# Patient Record
Sex: FEMALE | Race: WHITE | Employment: OTHER | URBAN - METROPOLITAN AREA
[De-identification: names, ages, dates, MRNs, and addresses within clinical notes are randomized per-mention and may not be internally consistent; named-entity substitution may affect disease eponyms.]

---

## 2017-03-27 ENCOUNTER — OFFICE VISIT (OUTPATIENT)
Dept: VASCULAR SURGERY | Age: 67
End: 2017-03-27

## 2017-03-27 DIAGNOSIS — I71.40 ABDOMINAL AORTIC ANEURYSM WITHOUT RUPTURE: ICD-10-CM

## 2017-03-27 NOTE — PROCEDURES
[facility]   *** FINAL REPORT ***    Name: Graciela Gandhi  MRN: [id number]       Outpatient  : 11 Sep 1950  HIS Order #: [id number]  [address] [facility] Visit #: [id number]  Date: 27 Mar 2017    TYPE OF TEST: Aorto-Iliac Duplex    REASON FOR TEST  AAA    B-Mode:-                 (cm)   1     2     3  Aortic diameter:         AP:     1.8   2.4   2.9                           TV:     2.2   2.8   3.2  Common iliac diameter:   Right: 1.20                           Left:  1.00    Abdominal aortic aneuysm:-  Location:                Infrarenal  Type:                    Fusiform  Distance from SMA (cm):    Duplex:-                           PSV  Stenosis                           ----- --------------------  Aorta: (1)                53.0 Normal         (2)                47.0         (3)                36.0    Right common iliac:       68.0 Normal  Right external iliac:    Left common iliac:       105.0 < 50% stenosis  Left external iliac:    INTERPRETATION/FINDINGS  Duplex images were obtained using 2-D gray scale, color flow and  spectral doppler analysis. 1. Infrarenal aortic aneurysm measuring 2.9 cm x 3.2 cm Trnvs.  2. The aneurysm is fusiform. 3. Mild atherosclerotic changes in the left common iliac artery. 4. Patent Celiac, SMA and renal arteries without evidence of stenosis. 5. Patent OSMAN origin without stenosis. No significant change from prior exam.    ADDITIONAL COMMENTS  Celiac 123 cm/sec,  cm/sec, RRA 85 cm/sec,  cm/sec, OSMAN  187 cm/sec    I have personally reviewed the data relevant to the interpretation of  this  study. TECHNOLOGIST: Ramon Smith RDMS  Signed: 2017 09:58 AM    PHYSICIAN: Yuriy Vaughn.  Georgia Maciel MD  Signed: 2017 12:47 PM

## 2017-04-10 ENCOUNTER — OFFICE VISIT (OUTPATIENT)
Dept: VASCULAR SURGERY | Age: 67
End: 2017-04-10

## 2017-04-10 VITALS
SYSTOLIC BLOOD PRESSURE: 132 MMHG | DIASTOLIC BLOOD PRESSURE: 74 MMHG | WEIGHT: 182 LBS | RESPIRATION RATE: 16 BRPM | HEART RATE: 70 BPM | HEIGHT: 63 IN | BODY MASS INDEX: 32.25 KG/M2

## 2017-04-10 DIAGNOSIS — M51.36 DDD (DEGENERATIVE DISC DISEASE), LUMBAR: ICD-10-CM

## 2017-04-10 DIAGNOSIS — I70.213 ATHEROSCLEROSIS OF NATIVE ARTERY OF BOTH LOWER EXTREMITIES WITH INTERMITTENT CLAUDICATION (HCC): ICD-10-CM

## 2017-04-10 DIAGNOSIS — I71.9 DESCENDING AORTIC ANEURYSM (HCC): Primary | ICD-10-CM

## 2017-04-10 DIAGNOSIS — M54.10 RADICULOPATHY, UNSPECIFIED SPINAL REGION: ICD-10-CM

## 2017-04-10 DIAGNOSIS — M79.89 LEG SWELLING: ICD-10-CM

## 2017-04-10 NOTE — MR AVS SNAPSHOT
Visit Information Date & Time Provider Department Dept. Phone Encounter #  
 4/10/2017  9:30  [address], [address] Rhoda Burton [address] [place] [address] [id number] Follow-up Instructions Return in about 1 year (around 4/10/2018). Your Appointments 5/2/2017  3:45 PM  
PROCEDURE with [facility] NONIMAGING  
[facility] (CRISTINE SCHEDULING) Appt Note: leg art wild [address] [address]  
[phone] [address] [facility] [place] [address]  
  
    
 4/10/2018  9:00 AM  
PROCEDURE with [facility] IMAGING 1  
[facility] (CRISTINE SCHEDULING) Appt Note: aaa 1yr wild [address] [address]  
[phone] [address] 500 [facility] [place] [address]  
  
    
 4/25/2018  9:00 AM  
Follow Up with [address], [address] [facility] ([facility]) Appt Note: 1 year fu after aorta study at our lab on 4/10/2018 with prep [address]  
[phone] [address] [facility] Upcoming Health Maintenance Date Due Hepatitis C Screening 1950 DTaP/Tdap/Td series (1 - Tdap) 9/11/1970 BREAST CANCER SCRN MAMMOGRAM 9/11/1999 FOBT Q 1 YEAR AGE 50-75 9/11/1999 ZOSTER VACCINE AGE 60> 9/11/2009 GLAUCOMA SCREENING Q2Y 9/11/2014 OSTEOPOROSIS SCREENING (DEXA) 9/11/2014 MEDICARE YEARLY EXAM 9/11/2014 Pneumococcal 65+ Low/Medium Risk (1 of 2 - PCV13) 7/1/2015 INFLUENZA AGE 9 TO ADULT 8/1/2016 Allergies as of 4/10/2017  Review Complete On: 4/10/2017 By: Una Hughes, CELIA Severity Noted Reaction Type Reactions Benadryl [Diphenhydramine Hcl]  07/09/2014    Hives Codeine  07/09/2014    Itching Cymbalta [Duloxetine]  02/16/2016   Side Effect Other (comments) Shaking, feeling someone living inside my body, arms going numb. Iodinated Contrast Media - Oral And Iv Dye  07/09/2014    Hives Pt states severe hives Levaquin [Levofloxacin]  07/09/2014    Nausea Only Lyrica [Pregabalin]  02/16/2016    Other (comments) Shaking, feeling someone living in my body, numb arm. Any meds for fibromyialga Other Medication  08/18/2016    Swelling Pt wrote having an allergic reaction to \"tetnus\". \"Extreme swelling at site\" Sulfur  07/09/2014    Hives Current Immunizations  Reviewed on 7/1/2016 Name Date Pneumococcal Polysaccharide (PPSV-23) 7/1/2014 Not reviewed this visit You Were Diagnosed With   
  
 Codes Comments Atherosclerosis of native artery of both lower extremities with intermittent claudication ([facility]    -  Primary ICD-10-CM: S22.898 ICD-9-CM: 440.21 Descending aortic aneurysm ([facility]     ICD-10-CM: I71.9 ICD-9-CM: 291. 9 Vitals BP Pulse Resp Height(growth percentile) Weight(growth percentile) BMI  
 132/74 (BP 1 Location: Left arm, BP Patient Position: Sitting) 70 16 5' 3\" (1.6 m) 182 lb (82.6 kg) 32.24 kg/m2 Smoking Status Former Smoker Vitals History BMI and BSA Data Body Mass Index Body Surface Area  
 32.24 kg/m 2 1.92 m 2 Preferred Pharmacy Pharmacy Name Phone  [facility] [phone] Your Updated Medication List  
  
   
This list is accurate as of: 4/10/17  9:48 AM.  Always use your most recent med list.  
  
  
  
  
 AMBIEN 10 mg tablet Generic drug:  zolpidem Take  by mouth nightly as needed for Sleep. aspirin delayed-release 81 mg tablet Take  by mouth every other day. B COMPLETE PO Take  by mouth daily. CALCIUM 600 + D 600-125 mg-unit Tab Generic drug:  calcium-cholecalciferol (d3) Take  by mouth daily. COZAAR 100 mg tablet Generic drug:  losartan Take 100 mg by mouth daily. DUREZOL 0.05 % ophthalmic emulsion Generic drug:  Difluprednate  
  
 ergocalciferol 50,000 unit capsule Commonly known as:  ERGOCALCIFEROL FISH OIL 1,000 mg Cap Generic drug:  omega-3 fatty acids-vitamin e Take 1 Cap by mouth. FLONASE 50 mcg/actuation nasal spray Generic drug:  fluticasone  
two (2) times daily as needed. LASIX 20 mg tablet Generic drug:  furosemide Take  by mouth as needed. LIPITOR 40 mg tablet Generic drug:  atorvastatin Take  by mouth daily. MAGNESIUM CARBONATE PO Take  by mouth daily. multivitamin tablet Commonly known as:  ONE A DAY Take 1 Tab by mouth daily. nicotinic acid 500 mg tablet Commonly known as:  NIACIN Take 500 mg by mouth two (2) times daily (with meals). ondansetron hcl 4 mg tablet Commonly known as:  ZOFRAN (AS HYDROCHLORIDE) Take 1 Tab by mouth every eight (8) hours as needed for Nausea. pantoprazole 40 mg tablet Commonly known as:  PROTONIX Take 40 mg by mouth two (2) times a day. potassium 99 mg tablet Take 45.5 mg by mouth daily. PROCARDIA XL 30 mg ER tablet Generic drug:  NIFEdipine ER Take 30 mg by mouth daily. SINGULAIR 10 mg tablet Generic drug:  montelukast  
Take 10 mg by mouth daily as needed. TENORMIN 50 mg tablet Generic drug:  atenolol Take  by mouth two (2) times a day. VICODIN 5-500 mg per tablet Generic drug:  HYDROcodone-acetaminophen Take  by mouth every four (4) hours as needed for Pain. XYZAL 5 mg tablet Generic drug:  levocetirizine Take  by mouth daily. Follow-up Instructions Return in about 1 year (around 4/10/2018). To-Do List   
 04/21/2017 Imaging:  LOWER EXT ART PVR W EXERC BILAT (TREADMILL/WALKING) AMB   
  
 04/10/2018 Imaging:  DUPLEX AORTA ILIAC GRAFT COMPLETE AMB Please provide this summary of care documentation to your next provider. Your primary care clinician is listed as David Tello. If you have any questions after today's visit, please call [phone].

## 2017-04-10 NOTE — PROGRESS NOTES
Arnoldo Thorne    Chief Complaint   Patient presents with    Leg Pain       History and Physical    Arnoldo Thorne is a 78 y.o. female who presents today for her one year f/u for a descending aortic aneurysm. She is doing well however continues to have multiple complaints of chronic back pain, generalized fatigue and bilateral leg pain. She states that her leg pain is worse on the left. She reports that the pain does seem to start in her back and radiate down the leg. She does also complain of burning in her feet and has a history of peripheral neuropathy. She does not describe any classic claudication type symptoms. No rest pain. She did have MRI of her back in October which showed significant degenerative disk disease with severe left neural foraminal narrowing of the L3-L4 level and right paracentral disc protrusion at T11-T12 causing moderate right canal  Narrowing. Also of note AAA was reported to be 3cm in size. Being injections her spine which she states did not seem to help and were so painful that she would not wish to have any further injections. She does state that she has been recommended for surgery but is trying to hold off as long as she can. She does also report some leg swelling which improves with leg elevation. Denies fever/chills. She denies sharp or stabbing back/abd pain. Her last CT scan was done in September of 2015. Scan was done without contrast due to severe reaction to IV dye and allergy to Benadryl (she refuses to have dye unless absolutely necessary). Scan showed stable descending thoracic aorta measuring 3.5 x 4.0 cm at the diaphragm. The ascending aorta measures 3.5 x 3.6 cm. She presents today for her annual follow up. Ultrasound done 3/27/17 shows stable infrarenal aortic aneurysm measuring 2.9 cm x 3.2 cm. The aneurysm is fusiform. Mild atherosclerotic changes in the left common iliac artery. Patent Celiac, SMA and renal arteries without evidence of stenosis.  Patent OSMAN origin without stenosis. Past Medical History:   Diagnosis Date    Arthritis     Chronic lung disease     Hypercholesterolemia     Hypertension     Ill-defined condition      Past Surgical History:   Procedure Laterality Date    HX APPENDECTOMY      HX CATARACT REMOVAL      HX GYN       Patient Active Problem List   Diagnosis Code    Descending aortic aneurysm ([facility] I71.9    Hypertension I10    Hypercholesterolemia E78.00    Arthritis M19.90    Incidental lung nodule, > 3mm and < 8mm R91.1    Bronchitis with chronic airway obstruction (HCC) J44.9     Current Outpatient Prescriptions   Medication Sig Dispense Refill    DUREZOL 0.05 % ophthalmic emulsion   0    ergocalciferol (ERGOCALCIFEROL) 50,000 unit capsule   0    ondansetron hcl (ZOFRAN, AS HYDROCHLORIDE,) 4 mg tablet Take 1 Tab by mouth every eight (8) hours as needed for Nausea. 12 Tab 0    NIFEdipine ER (PROCARDIA XL) 30 mg ER tablet Take 30 mg by mouth daily.  atenolol (TENORMIN) 50 mg tablet Take  by mouth two (2) times a day.  losartan (COZAAR) 100 mg tablet Take 100 mg by mouth daily.  atorvastatin (LIPITOR) 40 mg tablet Take  by mouth daily.  pantoprazole (PROTONIX) 40 mg tablet Take 40 mg by mouth two (2) times a day.  HYDROcodone-acetaminophen (VICODIN) 5-500 mg per tablet Take  by mouth every four (4) hours as needed for Pain.  zolpidem (AMBIEN) 10 mg tablet Take  by mouth nightly as needed for Sleep.  levocetirizine (XYZAL) 5 mg tablet Take  by mouth daily.  fluticasone (FLONASE) 50 mcg/actuation nasal spray two (2) times daily as needed.  montelukast (SINGULAIR) 10 mg tablet Take 10 mg by mouth daily as needed.  furosemide (LASIX) 20 mg tablet Take  by mouth as needed.  multivitamin (ONE A DAY) tablet Take 1 Tab by mouth daily.  calcium-cholecalciferol, d3, (CALCIUM 600 + D) 600-125 mg-unit tab Take  by mouth daily.       omega-3 fatty acids-vitamin e (FISH OIL) 1,000 mg cap Take 1 Cap by mouth.  nicotinic acid (NIACIN) 500 mg tablet Take 500 mg by mouth two (2) times daily (with meals).  aspirin delayed-release 81 mg tablet Take  by mouth every other day.  VITAMIN B COMPLEX (B COMPLETE PO) Take  by mouth daily.  MAGNESIUM CARBONATE PO Take  by mouth daily.  potassium 99 mg tablet Take 45.5 mg by mouth daily. Allergies   Allergen Reactions    Benadryl [Diphenhydramine Hcl] Hives    Codeine Itching    Cymbalta [Duloxetine] Other (comments)     Shaking, feeling someone living inside my body, arms going numb.  Iodinated Contrast Media - Oral And Iv Dye Hives     Pt states severe hives     Levaquin [Levofloxacin] Nausea Only    Lyrica [Pregabalin] Other (comments)     Shaking, feeling someone living in my body, numb arm. Any meds for fibromyialga    Other Medication Swelling     Pt wrote having an allergic reaction to \"tetnus\". \"Extreme swelling at site\"     Sulfur Hives       Physical Exam:    Visit Vitals    /74 (BP 1 Location: Left arm, BP Patient Position: Sitting)    Pulse 70    Resp 16    Ht 5' 3\" (1.6 m)    Wt 182 lb (82.6 kg)    BMI 32.24 kg/m2      General: Well-appearing female in no acute distress   HEENT: EOMI, no scleral icterus is noted. Pulmonary: No increased work or breathing is noted. Abdomen: nondistended    Extremities: Warm and well perfused bilaterally. Pt has trace BLE edema. Neuro: Cranial nerves II through XII are grossly intact   Integument: No ulcerations are identified visibly      Impression and Plan:  Claudia Price is a 78 y.o. female with a stable 3 cm AAA. Imaging was reviewed and office today with patient. She does have chronic pain secondary to her spinal issues. She is also having worsening leg pain, left greater than right. She does not describe any symptoms of classic claudication or rest pain. She does have some mild atherosclerosis noted in the left common iliac artery. Discussed that we will obtain arterial studies to rule out any underlying arterial insufficiency which may be contributing to her pseudoclaudication type symptoms. I did discuss that her leg pain is likely directly related to her back issues and she is understanding of this. Her arterial studies. Recommend leg elevation and compression therapy for her leg swelling and she expresses understanding. We discussed repeat ultrasound in 1 year for continued surveillance and she will f/u afterwards. Sooner as needed. Plan was discussed. Patient expresses understanding and agrees. Follow-up Disposition:  Return in about 1 year (around 4/10/2018).     Liberty Billings  [phone]

## 2017-05-02 ENCOUNTER — OFFICE VISIT (OUTPATIENT)
Dept: VASCULAR SURGERY | Age: 67
End: 2017-05-02

## 2017-05-02 DIAGNOSIS — I70.213 ATHEROSCLEROSIS OF NATIVE ARTERY OF BOTH LOWER EXTREMITIES WITH INTERMITTENT CLAUDICATION (HCC): ICD-10-CM

## 2017-05-02 DIAGNOSIS — I71.9 DESCENDING AORTIC ANEURYSM (HCC): ICD-10-CM

## 2017-05-02 NOTE — PROCEDURES
[facility]   *** FINAL REPORT ***    Name: Ishmael Lang  MRN: [id number]       Outpatient  : 11 Sep 1950  HIS Order #: [id number]  [id number] [facility] Visit #: [id number]  Date: 02 May 2017    TYPE OF TEST: Peripheral Arterial Testing    REASON FOR TEST  Limb pain    Right Leg  Segmentals: Normal                     mmHg  Brachial         135  High thigh  Low thigh  Calf             154  Posterior tibial 127  Dorsalis pedis   153  Peroneal  Metatarsal       117  Toe pressure  Doppler:    Normal  Ankle/Brachial: 1.13    Left Leg  Segmentals: Normal                     mmHg  Brachial         136  High thigh  Low thigh  Calf             153  Posterior tibial 118  Dorsalis pedis   143  Peroneal  Metatarsal       109  Toe pressure  Doppler:    Normal  Ankle/Brachial: 1.05  Post exercise results:  Speed: 1.0  mph  Grade: 12  %  Duration: 5 MIN     Brachial  Right Ankle  JORY    Left Ankle  JORY    1:               163     1.20       174     1.28  2:  3:  4:  5:    INTERPRETATION/FINDINGS  Physiologic testing was performed using continuous wave doppler and  segmental pressures. 1. No evidence of significant peripheral arterial disease at rest in  the right leg. 2. No evidence of significant peripheral arterial disease at rest in  the left leg. 3. The right ankle/brachial index is 1.13 and the left ankle/brachial  index is 1.05.  4. Treadmill testing of 5 minutes at 1.0mph at 12% incline showed no  significant drop in the ankle brachial index bilaterally or inflow  disease, consistent with normal peripheral arterial perfusion. ADDITIONAL COMMENTS    I have personally reviewed the data relevant to the interpretation of  this  study. TECHNOLOGIST: Arabella Espitia RDMS  Signed: 2017 04:26 PM    PHYSICIAN: Janie Magana.  Deanna Oviedo MD  Signed: 2017 09:54 AM

## 2017-05-03 ENCOUNTER — TELEPHONE (OUTPATIENT)
Dept: VASCULAR SURGERY | Age: 67
End: 2017-05-03

## 2017-05-03 NOTE — TELEPHONE ENCOUNTER
Call discussed patient's arterial study  Study showed no significant arterial insufficiency bilaterally that would explain her leg pain  Patient states that she is scheduled to follow-up with her orthopedic physician next week  I advised her to call the office if she has any further concerns or problems  Otherwise we will see her at her regular scheduled follow-up appointment

## 2017-06-07 ENCOUNTER — OFFICE VISIT (OUTPATIENT)
Dept: ORTHOPEDIC SURGERY | Age: 67
End: 2017-06-07

## 2017-06-07 VITALS
HEART RATE: 52 BPM | OXYGEN SATURATION: 92 % | RESPIRATION RATE: 18 BRPM | WEIGHT: 185.8 LBS | DIASTOLIC BLOOD PRESSURE: 63 MMHG | TEMPERATURE: 98.1 F | SYSTOLIC BLOOD PRESSURE: 143 MMHG | HEIGHT: 63 IN | BODY MASS INDEX: 32.92 KG/M2

## 2017-06-07 DIAGNOSIS — M47.26 OSTEOARTHRITIS OF SPINE WITH RADICULOPATHY, LUMBAR REGION: ICD-10-CM

## 2017-06-07 DIAGNOSIS — M50.90 CERVICAL DISC DISEASE: Primary | ICD-10-CM

## 2017-06-07 DIAGNOSIS — M79.7 FIBROMYALGIA: ICD-10-CM

## 2017-06-07 DIAGNOSIS — G62.9 NEUROPATHY: ICD-10-CM

## 2017-06-07 RX ORDER — DIVALPROEX SODIUM 250 MG/1
TABLET, DELAYED RELEASE ORAL
Refills: 0 | COMMUNITY
Start: 2017-04-11 | End: 2017-06-07

## 2017-06-07 RX ORDER — HYDROCODONE BITARTRATE AND ACETAMINOPHEN 5; 325 MG/1; MG/1
TABLET ORAL
Refills: 0 | COMMUNITY
Start: 2017-04-23 | End: 2017-06-07

## 2017-06-07 RX ORDER — METHOCARBAMOL 500 MG/1
TABLET, FILM COATED ORAL
Qty: 60 TAB | Refills: 1 | Status: ON HOLD | OUTPATIENT
Start: 2017-06-07 | End: 2017-08-21

## 2017-06-07 NOTE — PATIENT INSTRUCTIONS
Cervical Disc Disease: Care Instructions  Your Care Instructions    Cervical disc disease results from damage, disease, or wear and tear to the discs between the bones (vertebra) in your neck. The discs act as shock absorbers for the spine and keep the spine flexible. When a disc is damaged, it can bulge out and press against the nerve roots or spinal cord. This is sometimes called a herniated or \"slipped disc. \" This pressure can cause pain and numbness or tingling in your arms and hands. It can also cause weakness in your legs. An accident can damage a disc and cause it to break open (rupture). Aging and hard physical work can also cause damage to cervical discs. The first treatments for cervical disc disease include physical therapy, special neck exercises, heat, and pain medicine. If these fail, your doctor may inject steroids and pain medicine into your neck. Surgery is usually done only if other treatments have not worked. Follow-up care is a key part of your treatment and safety. Be sure to make and go to all appointments, and call your doctor if you are having problems. It's also a good idea to know your test results and keep a list of the medicines you take. How can you care for yourself at home? · Take pain medicines exactly as directed. ¨ If the doctor gave you a prescription medicine for pain, take it as prescribed. ¨ If you are not taking a prescription pain medicine, ask your doctor if you can take an over-the-counter medicine. · Don't spend too long in one position. Take short breaks to move around and change positions. · Wear a seat belt and shoulder harness when you are in a car. · Sleep with a pillow under your head and neck that keeps your neck straight. · Follow your doctor's instructions for gentle neck-stretching exercises. · Do not smoke. Smoking can slow healing of your discs. If you need help quitting, talk to your doctor about stop-smoking programs and medicines.  These can increase your chances of quitting for good. · Avoid strenuous work or exercise until your doctor says it is okay. When should you call for help? Call 911 anytime you think you may need emergency care. For example, call if:  · You are unable to move an arm or a leg at all. Call your doctor now or seek immediate medical care if:  · You have new or worse symptoms in your arms, legs, chest, belly, or buttocks. Symptoms may include:  ¨ Numbness or tingling. ¨ Weakness. ¨ Pain. · You lose bladder or bowel control. Watch closely for changes in your health, and be sure to contact your doctor if:  · You are not getting better as expected. Where can you learn more? Go to http://ian-tony.info/. Enter N118 in the search box to learn more about \"Cervical Disc Disease: Care Instructions. \"  Current as of: May 23, 2016  Content Version: 11.2  © 7274-7015 [employer]. Care instructions adapted under license by [employer] (which disclaims liability or warranty for this information). If you have questions about a medical condition or this instruction, always ask your healthcare professional. [address] any warranty or liability for your use of this information.

## 2017-06-07 NOTE — PROGRESS NOTES
[address]  [address], [address]  [place]  Phone: [phone]  Fax: [phone]        Laretta Collet  : 1950  PCP: Krissy Galeano MD      NEW PATIENT      ASSESSMENT AND PLAN     Cedric Johnson was seen today for back pain. Diagnoses and all orders for this visit:    Cervical disc disease  -     MRI CERV SPINE WO CONT; Future    Osteoarthritis of spine with radiculopathy, lumbar region  -     MRI CERV SPINE WO CONT; Future    Neuropathy  -     MRI CERV SPINE WO CONT; Future    Fibromyalgia  -     MRI CERV SPINE WO CONT; Future    Other orders  -     methocarbamol (ROBAXIN) 500 mg tablet; TAKE 1/2-1 TAB PO TID PRN SPASMS/PAIN    1. Cervical spine MRI to eval for cervical stenosis. 2. Trial of Robaxin. 3. Advised to stay active as tolerated. 4, Given information on cervical DDD. Follow-up Disposition:  Return for MRI/CT f/u. CHIEF COMPLAINT  Alex Santiago is seen today in consultation at the request of Dr. Ras Bright for complaints of back, neck, hips and knee pain for years. HISTORY OF PRESENT ILLNESS  Alex Santiago is a 78 y.o. female. Lately her pain has been increasing. She had an injury in  that started her pain and since this time she has been having pain all over her body \"except for the top of her head. \" She has been diagnosed with fibromyalgia. Pt notes that she has had injections, physical therapy, and medications without any benefit. She is most bothered by her back and leg pain. She has soreness in her mid back that radiates down into her low back. Her pain increases in severity as it radiates down her back. Her pain radiates into her BLE, into her knees and down into her feet. Her neck pain started in her head and down into her neck and shoulders. She has not tried aqua therapy. She has become tired of injections as she only has about a month-two months of benefit and her pain will return.  She notes that her LLE pain is more severe than her RLE. She has been told that she has neuropathy. Pt notes that she has paresthesia diffusely throughout her body. Pt has difficulty sleeping and staying active due to her pain. She admits to balance issues. No saddle paresthesia. Pt is unable to tolerate the medications for fibromyalgia as she has negative side effects with them. She takes Ambien every night that only gives her 5 hours of sleep. Pt uses PRN Flexeril. Denies persistent fevers, chills, weight changes, neurogenic bowel or bladder symptoms. Pt denies recent ED visits or hospitalizations. MRI images reviewed, diffuse changes. Pain Assessment  6/7/2017   Location of Pain Back;Hip;Knee; Foot   Location Modifiers Left;Right   Severity of Pain 5   Quality of Pain Aching;Burning   Quality of Pain Comment numbness, tingling   Frequency of Pain Constant   Aggravating Factors Standing;Walking;Bending   Relieving Factors (No Data)   Relieving Factors Comment laying down, pain pill         MRI Results (most recent):    Results from Hospital Encounter encounter on 10/08/16   MRI LUMB SPINE WO CONT   Narrative EXAM: Lumbar MRI without contrast    CLINICAL INDICATION: Low back pain    TECHNIQUE: MRI of the lumbar spine without contrast obtained. Multiplanar  multisequence MR images of the lumbar spine obtained. IV Contrast: None    COMPARISON: CT of the chest, abdomen and pelvis dated 7/7/2015    FINDINGS: All numbering assumes 5 lumbar type vertebrae. The alignment  demonstrates 4 mm of anterolisthesis of L4 on L5. The marrow signal is normal.  The cord terminates at L1. No cord signal abnormalities appreciated. The abdominal aorta is not well visualized on this exam. However, does appear to  be aneurysmal in the infrarenal portion measuring up to 3 cm in width. 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  The left IVC fuses  with the right IVC just inferior to the aortic hiatus in a retroaortic fashion. No paraaortic adenopathy appreciated. The visualized kidneys are unremarkable. The T11-T12 level demonstrates right paracentral disc protrusion which appears  to abut the cord and causes moderate right canal canal narrowing. The T12-L1 level demonstrates minimal degenerative changes. No significant canal  or neural foraminal stenosis. L1-L2 level: Mild broad-based disc bulge and mild facet arthropathy. No  significant canal or neural foraminal stenosis. L2-L3: Mild broad-based disc bulge with mild facet hypertrophy and ligament  flavum thickening. There is mild canal narrowing with moderate bilateral neural  foraminal narrowing. L3-L4: Facet arthropathy with broad-based disc bulge is present. Moderate right  and severe left neural foraminal narrowing are noted. There is minimal impact  upon the canal.    L4-L5: Anterolisthesis is present. Facet arthropathy and mild broad-based disc  bulge is noted. Mild to moderate bilateral neural foraminal narrowing is noted. Minimal impact upon the canal is noted. L5-S1: Facet arthropathy is present. Mild broad-based disc bulge is noted. Moderate left and mild right neural foraminal narrowing are noted. No  significant canal stenosis. A right sided Tarlov cyst is noted at about the level of S2-S3. Impression Impression:  1. Multilevel degenerative disc disease and facet arthropathy. 2.  Infrarenal abdominal aortic aneurysm which is incompletely evaluated on this  exam.     3.  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 4.  Severe left neural foraminal narrowing of the L3-L4 level. 5.  Right paracentral disc protrusion at T11-T12 causing moderate right canal  narrowing.                       PAST MEDICAL HISTORY   Past Medical History:   Diagnosis Date    Arthritis     Chronic lung disease     Hypercholesterolemia     Hypertension     Ill-defined condition        Past Surgical History:   Procedure Laterality Date    HX APPENDECTOMY      HX CATARACT REMOVAL      HX GYN         MEDICATIONS    Current Outpatient Prescriptions   Medication Sig Dispense Refill    ergocalciferol (ERGOCALCIFEROL) 50,000 unit capsule   0    NIFEdipine ER (PROCARDIA XL) 30 mg ER tablet Take 30 mg by mouth daily.  atenolol (TENORMIN) 50 mg tablet Take  by mouth two (2) times a day.  losartan (COZAAR) 100 mg tablet Take 100 mg by mouth daily.  atorvastatin (LIPITOR) 40 mg tablet Take  by mouth daily.  pantoprazole (PROTONIX) 40 mg tablet Take 40 mg by mouth two (2) times a day.  zolpidem (AMBIEN) 10 mg tablet Take  by mouth nightly as needed for Sleep.  levocetirizine (XYZAL) 5 mg tablet Take  by mouth daily.  fluticasone (FLONASE) 50 mcg/actuation nasal spray two (2) times daily as needed.  montelukast (SINGULAIR) 10 mg tablet Take 10 mg by mouth daily as needed.  multivitamin (ONE A DAY) tablet Take 1 Tab by mouth daily.  calcium-cholecalciferol, d3, (CALCIUM 600 + D) 600-125 mg-unit tab Take  by mouth daily.  omega-3 fatty acids-vitamin e (FISH OIL) 1,000 mg cap Take 1 Cap by mouth.  aspirin delayed-release 81 mg tablet Take  by mouth every other day.  VITAMIN B COMPLEX (B COMPLETE PO) Take  by mouth daily.  MAGNESIUM CARBONATE PO Take  by mouth daily.  potassium 99 mg tablet Take 45.5 mg by mouth daily.  divalproex DR (DEPAKOTE) 250 mg tablet take 1 tablet by mouth once daily  0    HYDROcodone-acetaminophen (NORCO) 5-325 mg per tablet TAKE 1 TABLET BY MOUTH EVERY 8 HOURS AS NEEDED  0    DUREZOL 0.05 % ophthalmic emulsion   0    ondansetron hcl (ZOFRAN, AS HYDROCHLORIDE,) 4 mg tablet Take 1 Tab by mouth every eight (8) hours as needed for Nausea. 12 Tab 0    HYDROcodone-acetaminophen (VICODIN) 5-500 mg per tablet Take  by mouth every four (4) hours as needed for Pain.  furosemide (LASIX) 20 mg tablet Take  by mouth as needed.       nicotinic acid (NIACIN) 500 mg tablet Take 500 mg by mouth two (2) times daily (with meals). ALLERGIES  Allergies   Allergen Reactions    Benadryl [Diphenhydramine Hcl] Hives    Codeine Itching    Cymbalta [Duloxetine] Other (comments)     Shaking, feeling someone living inside my body, arms going numb.  Iodinated Contrast Media - Oral And Iv Dye Hives     Pt states severe hives     Levaquin [Levofloxacin] Nausea Only    Lyrica [Pregabalin] Other (comments)     Shaking, feeling someone living in my body, numb arm. Any meds for fibromyialga    Other Medication Swelling     Pt wrote having an allergic reaction to \"tetnus\". \"Extreme swelling at site\"     Sulfur Hives          SOCIAL HISTORY    Social History     Social History    Marital status:      Spouse name: N/A    Number of children: N/A    Years of education: N/A     Occupational History    Not on file. Social History Main Topics    Smoking status: Former Smoker     Packs/day: 1.00     Years: 45.00     Types: Cigarettes     Quit date: 1/1/2014    Smokeless tobacco: Not on file    Alcohol use No    Drug use: No    Sexual activity: Not on file     Other Topics Concern    Not on file     Social History Narrative       FAMILY HISTORY  Family History   Problem Relation Age of Onset    Hypertension Mother     Diabetes Mother     Cancer Father     Stroke Father          REVIEW OF SYSTEMS  Review of Systems   Constitutional: Negative for chills, fever and weight loss. Respiratory: Negative for shortness of breath. Cardiovascular: Negative for chest pain. Gastrointestinal: Negative for constipation. Negative for fecal incontinence   Genitourinary: Negative for dysuria. Negative for urinary incontinence   Musculoskeletal:        Per HPI   Skin: Negative for rash. Neurological: Positive for tingling. Negative for dizziness, tremors, focal weakness and headaches. Endo/Heme/Allergies: Does not bruise/bleed easily. Psychiatric/Behavioral: The patient does not have insomnia. PHYSICAL EXAMINATION  Visit Vitals    /63    Pulse (!) 52    Temp 98.1 °F (36.7 °C) (Oral)    Resp 18    Ht 5' 3\" (1.6 m)    Wt 185 lb 12.8 oz (84.3 kg)    SpO2 92%    BMI 32.91 kg/m2          Accompanied by self. Constitutional:  Well developed, well nourished, in no acute distress. Psychiatric: Affect and mood are appropriate. Integumentary: No rashes or abrasions noted on exposed areas. Cardiovascular/Peripheral Vascular: Intact l pulses. No peripheral edema is noted. Lymphatic:  No evidence of lymphedema. No cervical lymphadenopathy. SPINE/MUSCULOSKELETAL EXAM    Cervical spine:  Neck is midline. Normal muscle tone. No focal atrophy is noted. Shoulder ROM intact. Tenderness to palpation diffusely cervical and thoracic region B/L. Negative Spurling's sign. Negative Tinel's sign. Negative Nath's sign. Sensation grossly intact to light touch. Lumbar spine:  No rash, ecchymosis, or gross obliquity. No fasciculations. No focal atrophy is noted. Tenderness to palpation of diffusely. Sensation grossly intact to light touch. MOTOR:      Biceps  Triceps Deltoids Wrist Ext Wrist Flex Hand Intrin   Right +4/5 +4/5 +4/5 +4/5 +4/5 +4/5   Left +4/5 +4/5 +4/5 +4/5 +4/5 +4/5        Hip Flex  Quads Hamstrings Ankle DF EHL Ankle PF   Right +4/5 +4/5 +4/5 +4/5 +4/5 +4/5   Left +4/5 +4/5 +4/5 +4/5 +4/5 +4/5     DTRs are 2+ biceps, triceps, brachioradialis, patella, and Achilles. Straight Leg raise negative. Mild difficulty with tandem gait. Ambulation without assistive device. FWB. Written by Brayan Leblanc, as dictated by Alice Encarnacion MD.    I, Dr. Alice Encarnacion MD, confirm that all documentation is accurate. Ms. Merlin Majestic may have a reminder for a \"due or due soon\" health maintenance. I have asked that she contact her primary care provider for follow-up on this health maintenance.

## 2017-06-07 NOTE — PROGRESS NOTES
VORB ENTERED PER DR. Mark Castillo AS DOCUMENTED ON BLUE SHEET:MRI CERVICAL WO CONTRAST FOR INCREASED NECK PAIN N/T/W EXTENDING TO B/L UPPER EXT(S); ROBAXIN 500 MG 1/2-1 TAB PO TID PRN SPASMS/PAIN #60 RF 1.

## 2017-06-09 ENCOUNTER — HOSPITAL ENCOUNTER (OUTPATIENT)
Age: 67
Discharge: HOME | End: 2017-06-09
Attending: PHYSICAL MEDICINE & REHABILITATION
Payer: MEDICARE

## 2017-06-09 DIAGNOSIS — G62.9 NEUROPATHY: ICD-10-CM

## 2017-06-09 DIAGNOSIS — M79.7 FIBROMYALGIA: ICD-10-CM

## 2017-06-09 DIAGNOSIS — M50.90 CERVICAL DISC DISEASE: ICD-10-CM

## 2017-06-09 DIAGNOSIS — M47.26 OSTEOARTHRITIS OF SPINE WITH RADICULOPATHY, LUMBAR REGION: ICD-10-CM

## 2017-06-09 PROCEDURE — 72141 MRI NECK SPINE W/O DYE: CPT

## 2017-06-15 ENCOUNTER — OFFICE VISIT (OUTPATIENT)
Dept: ORTHOPEDIC SURGERY | Age: 67
End: 2017-06-15

## 2017-06-15 VITALS
DIASTOLIC BLOOD PRESSURE: 58 MMHG | HEART RATE: 60 BPM | WEIGHT: 183 LBS | BODY MASS INDEX: 32.43 KG/M2 | HEIGHT: 63 IN | SYSTOLIC BLOOD PRESSURE: 124 MMHG

## 2017-06-15 DIAGNOSIS — M50.90 CERVICAL DISC DISEASE: Primary | ICD-10-CM

## 2017-06-15 NOTE — MR AVS SNAPSHOT
Visit Information Date & Time Provider Department Dept. Phone Encounter #  
 6/15/2017 11:45 AM Kim Mccartney MD [facility] [phone] [id number] Follow-up Instructions Return if symptoms worsen or fail to improve. Your Appointments 4/10/2018  9:00 AM  
PROCEDURE with [facility] ([facility]) Appt Note: aaa 1yr wild; r/s to new office [address] [facility] [address] [facility] [address]  
[phone] [address] [facility] [address] [facility]  
  
    
 4/25/2018  9:00 AM  
Follow Up with [facility] ([facility]) Appt Note: 1 year fu after aorta study at our lab on 4/10/2018 with prep [address] [facility] [address] [facility] [address]  
[phone] [address] [facility] [place] [address] [facility] [address] Upcoming Health Maintenance Date Due Hepatitis C Screening 1950 DTaP/Tdap/Td series (1 - Tdap) 9/11/1970 BREAST CANCER SCRN MAMMOGRAM 9/11/1999 FOBT Q 1 YEAR AGE 50-75 9/11/1999 ZOSTER VACCINE AGE 60> 9/11/2009 GLAUCOMA SCREENING Q2Y 9/11/2014 OSTEOPOROSIS SCREENING (DEXA) 9/11/2014 MEDICARE YEARLY EXAM 9/11/2014 Pneumococcal 65+ Low/Medium Risk (1 of 2 - PCV13) 7/1/2015 INFLUENZA AGE 9 TO ADULT 8/1/2017 Allergies as of 6/15/2017  Review Complete On: 6/15/2017 By: Kim Mccartney MD  
  
 Severity Noted Reaction Type Reactions Benadryl [Diphenhydramine Hcl]  07/09/2014    Hives Codeine  07/09/2014    Itching Cymbalta [Duloxetine]  02/16/2016   Side Effect Other (comments) Shaking, feeling someone living inside my body, arms going numb. Iodinated Contrast- Oral And Iv Dye  07/09/2014    Hives Pt states severe hives Levaquin [Levofloxacin]  07/09/2014    Nausea Only Lyrica [Pregabalin]  02/16/2016    Other (comments) Shaking, feeling someone living in my body, numb arm. Any meds for fibromyialga Other Medication  08/18/2016    Swelling Pt wrote having an allergic reaction to \"tetnus\". \"Extreme swelling at site\" Sulfur  07/09/2014    Hives Current Immunizations  Reviewed on 7/1/2016 Name Date Pneumococcal Polysaccharide (PPSV-23) 7/1/2014 Not reviewed this visit Vitals BP Pulse Height(growth percentile) Weight(growth percentile) BMI Smoking Status 124/58 60 5' 3\" (1.6 m) 183 lb (83 kg) 32.42 kg/m2 Former Smoker Vitals History BMI and BSA Data Body Mass Index Body Surface Area  
 32.42 kg/m 2 1.92 m 2 Preferred Pharmacy Pharmacy Name Phone [address], [address] [place] [address] [phone] Your Updated Medication List  
  
   
This list is accurate as of: 6/15/17 12:40 PM.  Always use your most recent med list.  
  
  
  
  
 AMBIEN 10 mg tablet Generic drug:  zolpidem Take  by mouth nightly as needed for Sleep. aspirin delayed-release 81 mg tablet Take  by mouth every other day. B COMPLETE PO Take  by mouth daily. CALCIUM 600 + D 600-125 mg-unit Tab Generic drug:  calcium-cholecalciferol (d3) Take  by mouth daily. COZAAR 100 mg tablet Generic drug:  losartan Take 100 mg by mouth daily. ergocalciferol 50,000 unit capsule Commonly known as:  ERGOCALCIFEROL Flaxseed Oil Oil  
by Does Not Apply route. FLONASE 50 mcg/actuation nasal spray Generic drug:  fluticasone  
two (2) times daily as needed. LIPITOR 40 mg tablet Generic drug:  atorvastatin Take  by mouth daily. MAGNESIUM CARBONATE PO Take  by mouth daily. methocarbamol 500 mg tablet Commonly known as:  ROBAXIN  
TAKE 1/2-1 TAB PO TID PRN SPASMS/PAIN  
  
 multivitamin tablet Commonly known as:  ONE A DAY Take 1 Tab by mouth daily. pantoprazole 40 mg tablet Commonly known as:  PROTONIX Take 40 mg by mouth two (2) times a day. potassium 99 mg tablet Take 45.5 mg by mouth daily. PROCARDIA XL 30 mg ER tablet Generic drug:  NIFEdipine ER Take 30 mg by mouth daily. SINGULAIR 10 mg tablet Generic drug:  montelukast  
Take 10 mg by mouth daily as needed. TENORMIN 50 mg tablet Generic drug:  atenolol Take  by mouth two (2) times a day. XYZAL 5 mg tablet Generic drug:  levocetirizine Take  by mouth daily. Follow-up Instructions Return if symptoms worsen or fail to improve. Patient Instructions Learning About Medial Branch Block and Neurotomy What are medial branch block and neurotomy? Facet joints connect your vertebrae to each other. Problems in these joints can cause chronic (long-term) pain in the neck or back. They can sometimes affect the shoulders, arms, buttocks, or legs. Medial branch nerves are the nerves that carry many of the pain messages from your facet joints. Radiofrequency medial branch neurotomy is a type of medial branch neurotomy that is used to relieve arthritis pain. It uses radio waves to damage nerves in your neck or back so that they can no longer send pain messages to your brain. Before your doctor knows if a neurotomy will help you, he or she will do a medial branch block to find out if certain nerves are the ones that are a source of your pain. You will need two separate visits to the outpatient center or hospital to have both procedures. How is a medial branch block done? The doctor will use a tiny needle to numb the skin where you will get the block. Then he or she puts the block needle into the numbed area. You may feel some pressure, but you should not feel pain. Using fluoroscopy (live X-ray) to guide the needle, the doctor injects medicine onto one or more nerves to make them numb.  
If you get relief from your pain in the next 4 to 6 hours, it's a sign that those nerves may be contributing to your pain. The relief will last only a short time. You may then have a medial branch neurotomy at a later visit to try to get longer relief. It takes 20 to 30 minutes to get the block. You can go home after the doctor watches you for about an hour. You will get instructions on how to report how much pain you have when you are at home. You will need someone to drive you home. How is medial branch neurotomy done? The doctor will use a tiny needle to numb the skin where you will get the neurotomy. Then he or she puts the neurotomy needle into the numbed area. You may feel some pressure. Using fluoroscopy (live X-ray) to guide the needle, the doctor sends radio waves through the needle to the nerve for 60 to 90 seconds. The radio waves heat the nerve, which damages it. The doctor may do this several times. And he or she may treat more than one nerve. It takes 45 to 90 minutes to get a neurotomy, depending on how many nerves are heated. You will probably go home 30 to 60 minutes later. You will need someone to drive you home. What can you expect after a neurotomy? You may feel a little sore or tender at the injection site at first. But after a successful neurotomy, most people have pain relief right away. It often lasts for 9 to 12 months or longer. Sometimes the pain relief is permanent. If your pain does come back, it may mean that the damaged nerve has healed and can send pain messages again. Or it can mean that a different nerve is causing pain. Your doctor will discuss your options with you. Follow-up care is a key part of your treatment and safety. Be sure to make and go to all appointments, and call your doctor if you are having problems. It's also a good idea to know your test results and keep a list of the medicines you take. Where can you learn more? Go to http://ian-tony.info/. Enter X459 in the search box to learn more about \"Learning About Medial Branch Block and Neurotomy. \" Current as of: October 14, 2016 Content Version: 11.2 © 4112-4060 [employer]. Care instructions adapted under license by [employer] (which disclaims liability or warranty for this information). If you have questions about a medical condition or this instruction, always ask your healthcare professional. [place] [address] any warranty or liability for your use of this information. Please provide this summary of care documentation to your next provider. Your primary care clinician is listed as Kenia Pettit. If you have any questions after today's visit, please call [phone].

## 2017-06-15 NOTE — PATIENT INSTRUCTIONS
Learning About Medial Branch Block and Neurotomy  What are medial branch block and neurotomy? Facet joints connect your vertebrae to each other. Problems in these joints can cause chronic (long-term) pain in the neck or back. They can sometimes affect the shoulders, arms, buttocks, or legs. Medial branch nerves are the nerves that carry many of the pain messages from your facet joints. Radiofrequency medial branch neurotomy is a type of medial branch neurotomy that is used to relieve arthritis pain. It uses radio waves to damage nerves in your neck or back so that they can no longer send pain messages to your brain. Before your doctor knows if a neurotomy will help you, he or she will do a medial branch block to find out if certain nerves are the ones that are a source of your pain. You will need two separate visits to the outpatient center or hospital to have both procedures. How is a medial branch block done? The doctor will use a tiny needle to numb the skin where you will get the block. Then he or she puts the block needle into the numbed area. You may feel some pressure, but you should not feel pain. Using fluoroscopy (live X-ray) to guide the needle, the doctor injects medicine onto one or more nerves to make them numb. If you get relief from your pain in the next 4 to 6 hours, it's a sign that those nerves may be contributing to your pain. The relief will last only a short time. You may then have a medial branch neurotomy at a later visit to try to get longer relief. It takes 20 to 30 minutes to get the block. You can go home after the doctor watches you for about an hour. You will get instructions on how to report how much pain you have when you are at home. You will need someone to drive you home. How is medial branch neurotomy done? The doctor will use a tiny needle to numb the skin where you will get the neurotomy. Then he or she puts the neurotomy needle into the numbed area.  You may feel some pressure. Using fluoroscopy (live X-ray) to guide the needle, the doctor sends radio waves through the needle to the nerve for 60 to 90 seconds. The radio waves heat the nerve, which damages it. The doctor may do this several times. And he or she may treat more than one nerve. It takes 45 to 90 minutes to get a neurotomy, depending on how many nerves are heated. You will probably go home 30 to 60 minutes later. You will need someone to drive you home. What can you expect after a neurotomy? You may feel a little sore or tender at the injection site at first. But after a successful neurotomy, most people have pain relief right away. It often lasts for 9 to 12 months or longer. Sometimes the pain relief is permanent. If your pain does come back, it may mean that the damaged nerve has healed and can send pain messages again. Or it can mean that a different nerve is causing pain. Your doctor will discuss your options with you. Follow-up care is a key part of your treatment and safety. Be sure to make and go to all appointments, and call your doctor if you are having problems. It's also a good idea to know your test results and keep a list of the medicines you take. Where can you learn more? Go to http://ian-tony.info/. Enter J401 in the search box to learn more about \"Learning About Medial Branch Block and Neurotomy. \"  Current as of: October 14, 2016  Content Version: 11.2  © 4336-5508 [employer]. Care instructions adapted under license by [employer] (which disclaims liability or warranty for this information). If you have questions about a medical condition or this instruction, always ask your healthcare professional. [place] [address] any warranty or liability for your use of this information.

## 2017-06-15 NOTE — PROGRESS NOTES
Dana [facility] [address].  [address], [address]  [address]  Phone: [phone]  Fax: [phone]        Argenis Sahu  : 1950  PCP: General Ramirez, NP    PROGRESS NOTE      ASSESSMENT AND PLAN    Nikhil Castillo was seen today for follow-up. Diagnoses and all orders for this visit:    Cervical disc disease     1. Discussed RFA as possible treatment option. 2. DC Robaxin if no benefit. 3. No indications for cervical SHAKIRA at this time. 4. Given information on medial branch block. 5. Pt may call and be referred to Dr. Odette Presley for cervical and lumbar RFA. Follow-up Disposition:  Return if symptoms worsen or fail to improve. HISTORY OF PRESENT ILLNESS  Aston Lennon is a 78 y.o. female. Pt presents to the office for a f/u visit for neck pain. Last visit pt was sent to have a cervical spine MRI. Images reviewed with the pt. Pt was given a trial of Robaxin at her last visit. Pt continues to have neck pain. She has all over pain except for the top of her head. Her LT side bothers her slightly more than her RT side. Her neck pain radiates into her shoulders. Her back and BLE symptoms remain unchanged. She has neuropathy as well. Pt has gotten to the point that she is unable to tolerate her pain. She has balance issues. Pt is unable to tolerate medications for fibromyalgia due to side effects. She does not sleep well at night due to her pain. She takes Robaxin PRN without much benefit. Denies persistent fevers, chills, weight changes, neurogenic bowel or bladder symptoms. Pt denies recent ED visits or hospitalizations. PMHx of fibromyalgia.      Pain Assessment  6/15/2017   Location of Pain Neck;Arm   Location Modifiers Left;Right   Severity of Pain 6   Quality of Pain Aching   Quality of Pain Comment numbness tingling   Duration of Pain Persistent   Frequency of Pain Constant   Aggravating Factors -   Limiting Behavior Some   Relieving Factors Nothing   Relieving Factors Comment -           MRI Results (most recent):    Results from Hospital Encounter encounter on 06/09/17   MRI CERV SPINE WO CONT   Narrative EXAMINATION: MRI cervical spine without contrast    INDICATION: Chronic neck pain, bilateral upper extremity paresthesia    COMPARISON: None    TECHNIQUE: Sagittal and axial multiecho sequences of the cervical spine  performed without contrast.    FINDINGS:    General: Vertebral body and disc space heights are preserved. No significant  endplate changes. No focal listhesis. Lordosis maintained. No evidence of  abnormal signal in the cervical cord. No abnormal epidural collection  identified. No suspicious marrow lesions. Miscellaneous: Surrounding soft tissues unremarkable. Levels:    Craniocervical junction: No significant degenerative change or stenosis. C2-C3: Minimal uncovertebral spurring. Focal ligamentum flavum bulge right of  midline minimally impressing the right dorsal cord. Facets otherwise  unremarkable. Overall spinal canal patent. Foramina patent. C3-C4: Small central disc protrusion. Mild left facet arthropathy. Spinal canal  patent. Foramina patent. C4-C5: Small central disc protrusion. Mild bilateral facet arthropathy. Spinal  canal patent. Foramina patent. C5-C6: Posterior annular tear with small central disc protrusion. Mild bilateral  facet arthropathy with ligamentum flavum bulge. Mild spinal canal stenosis. Foramina patent. C6-C7: Small central disc protrusion with mild disc osteophyte bulge. Mild  bilateral uncovertebral spurring. Mild bilateral facet arthropathy. Mild spinal  canal stenosis. Foramina patent. C7-T1: No significant disc disease. Moderate facet arthropathy. Spinal canal  stenosis. Mild foraminal stenoses. T1-T2: Evaluated sagittal plane only. Grade 1 anterolisthesis. Moderate  bilateral facet arthropathy. Spinal canal patent. Mild bilateral foraminal  stenoses.     T2-T3: Evaluated sagittal plane only. Grade 1 anterolisthesis. Moderate facet  arthropathy. Spinal canal patent. Mild foraminal stenoses. T3-T4, T4-T5: Evaluated sagittal plane only. No stenosis. Impression IMPRESSION:    1. Multilevel mild degenerative disc disease. Notable facet arthropathy along  cervicothoracic junction. See level by level details above. -Mild spinal canal stenosis at C5-C6 and C6-C7 with minimal impression upon the  ventral cord at C5-C6. Focal right of midline ligamentum flavum bulge at C2-C3  mildly impressing the dorsal cord. -Mild multilevel foraminal stenoses. -Findings are similar to 2013 MRI. Disc disease appears to have progressed at  C6-C7 level. 2. Please see upper thoracic findings above also. PAST MEDICAL HISTORY   Past Medical History:   Diagnosis Date    Arthritis     Chronic lung disease     Hypercholesterolemia     Hypertension     Ill-defined condition        Past Surgical History:   Procedure Laterality Date    HX APPENDECTOMY      HX CATARACT REMOVAL      HX GYN     . MEDICATIONS      Current Outpatient Prescriptions   Medication Sig Dispense Refill    Flaxseed Oil oil by Does Not Apply route.  methocarbamol (ROBAXIN) 500 mg tablet TAKE 1/2-1 TAB PO TID PRN SPASMS/PAIN 60 Tab 1    ergocalciferol (ERGOCALCIFEROL) 50,000 unit capsule   0    NIFEdipine ER (PROCARDIA XL) 30 mg ER tablet Take 30 mg by mouth daily.  atenolol (TENORMIN) 50 mg tablet Take  by mouth two (2) times a day.  losartan (COZAAR) 100 mg tablet Take 100 mg by mouth daily.  atorvastatin (LIPITOR) 40 mg tablet Take  by mouth daily.  pantoprazole (PROTONIX) 40 mg tablet Take 40 mg by mouth two (2) times a day.  zolpidem (AMBIEN) 10 mg tablet Take  by mouth nightly as needed for Sleep.  levocetirizine (XYZAL) 5 mg tablet Take  by mouth daily.  fluticasone (FLONASE) 50 mcg/actuation nasal spray two (2) times daily as needed.       montelukast (SINGULAIR) 10 mg tablet Take 10 mg by mouth daily as needed.  multivitamin (ONE A DAY) tablet Take 1 Tab by mouth daily.  calcium-cholecalciferol, d3, (CALCIUM 600 + D) 600-125 mg-unit tab Take  by mouth daily.  aspirin delayed-release 81 mg tablet Take  by mouth every other day.  VITAMIN B COMPLEX (B COMPLETE PO) Take  by mouth daily.  MAGNESIUM CARBONATE PO Take  by mouth daily.  potassium 99 mg tablet Take 45.5 mg by mouth daily. ALLERGIES    Allergies   Allergen Reactions    Benadryl [Diphenhydramine Hcl] Hives    Codeine Itching    Cymbalta [Duloxetine] Other (comments)     Shaking, feeling someone living inside my body, arms going numb.  Iodinated Contrast- Oral And Iv Dye Hives     Pt states severe hives     Levaquin [Levofloxacin] Nausea Only    Lyrica [Pregabalin] Other (comments)     Shaking, feeling someone living in my body, numb arm. Any meds for fibromyialga    Other Medication Swelling     Pt wrote having an allergic reaction to \"tetnus\". \"Extreme swelling at site\"     Sulfur Hives          SOCIAL HISTORY    Social History     Social History    Marital status:      Spouse name: N/A    Number of children: N/A    Years of education: N/A     Occupational History    Not on file. Social History Main Topics    Smoking status: Former Smoker     Packs/day: 1.00     Years: 45.00     Types: Cigarettes     Quit date: 1/1/2014    Smokeless tobacco: Not on file    Alcohol use No    Drug use: No    Sexual activity: Not on file     Other Topics Concern    Not on file     Social History Narrative       FAMILY HISTORY    Family History   Problem Relation Age of Onset    Hypertension Mother     Diabetes Mother     Cancer Father     Stroke Father        REVIEW OF SYSTEMS  Review of Systems   Constitutional: Negative for chills, fever and weight loss. Respiratory: Negative for shortness of breath.     Cardiovascular: Negative for chest pain. Gastrointestinal: Negative for constipation. Negative for fecal incontinence   Genitourinary: Negative for dysuria. Negative for urinary incontinence   Musculoskeletal: Positive for back pain, joint pain, myalgias and neck pain. Per HPI   Skin: Negative for rash. Neurological: Negative for dizziness, tingling, tremors, focal weakness and headaches. Endo/Heme/Allergies: Does not bruise/bleed easily. Psychiatric/Behavioral: The patient does not have insomnia. PHYSICAL EXAMINATION  Visit Vitals    /58    Pulse 60    Ht 5' 3\" (1.6 m)    Wt 183 lb (83 kg)    BMI 32.42 kg/m2         Accompanied by self. Constitutional:  Well developed, well nourished, in no acute distress. Psychiatric: Affect and mood are appropriate. Integumentary: No rashes or abrasions noted on exposed areas. Cardiovascular/Peripheral Vascular: Intact l pulses. No peripheral edema is noted. Lymphatic:  No evidence of lymphedema. No cervical lymphadenopathy. SPINE/MUSCULOSKELETAL EXAM    Cervical spine:  Neck is midline. Normal muscle tone. No focal atrophy is noted. Shoulder ROM intact. Tenderness to palpation diffusely. Negative Spurling's sign. Negative Tinel's sign. Negative Nath's sign. Sensation grossly intact to light touch. MOTOR:      Biceps  Triceps Deltoids Wrist Ext Wrist Flex Hand Intrin   Right +4/5 +4/5 +4/5 +4/5 +4/5 +4/5   Left +4/5 +4/5 +4/5 +4/5 +4/5 +4/5       Ambulation without assistive device. FWB. Written by Nancy Dejesus, as dictated by Randa Gonzalez MD.    I, Dr. Randa Gonzalez MD, confirm that all documentation is accurate. Ms. Daphne Hopper may have a reminder for a \"due or due soon\" health maintenance. I have asked that she contact her primary care provider for follow-up on this health maintenance.

## 2017-06-21 ENCOUNTER — TELEPHONE (OUTPATIENT)
Dept: ORTHOPEDIC SURGERY | Age: 67
End: 2017-06-21

## 2017-06-21 DIAGNOSIS — G62.9 NEUROPATHY: ICD-10-CM

## 2017-06-21 DIAGNOSIS — M50.90 CERVICAL DISC DISEASE: Primary | ICD-10-CM

## 2017-06-21 DIAGNOSIS — M47.26 OSTEOARTHRITIS OF SPINE WITH RADICULOPATHY, LUMBAR REGION: ICD-10-CM

## 2017-06-21 NOTE — TELEPHONE ENCOUNTER
Pt called in stating that she has some questions related to paperwork given to her by Dr. Chel Ponce for [facility] and Neurotomy. Pt states that in the paperwork she read it uses IV Dye and she is allergic to IV Dye. Pt wants to know if there is another way to do procedure without IV Dye. Pt also needs to schedule appt with Dr. Marie Bell (sp) for procedure as well. Pt can be reached at [phone].

## 2017-06-22 NOTE — TELEPHONE ENCOUNTER
Last seen 06/15/2017 with Dr. Alise Coburn:    Cervical disc disease     1. Discussed RFA as possible treatment option. 2. DC Robaxin if no benefit. 3. No indications for cervical SHAKIRA at this time. 4. Given information on medial branch block. 5. Pt may call and be referred to Dr. Que Wheat for cervical and lumbar RFA.      Follow-up Disposition:  Return if symptoms worsen or fail to improve. VORB ENTERED PER DR. Creig Galeazzi AS DOCUMENTED UNDER DR. Dami Millan NOTE: FOR REFERRAL TO DR. DE FOR CERVICAL AND LUMBAR RFA. Please advise on pt's concern/question below.

## 2017-06-26 NOTE — TELEPHONE ENCOUNTER
Patient will need to be seen by Dr. Mariposa Barajas regarding RFA since he will do procedure. He will know if it can be done without dye or what kind of dye is used and if there is a cross allergy.

## 2017-06-28 ENCOUNTER — OFFICE VISIT (OUTPATIENT)
Dept: PAIN MANAGEMENT | Age: 67
End: 2017-06-28

## 2017-06-28 VITALS
BODY MASS INDEX: 32.42 KG/M2 | HEART RATE: 53 BPM | WEIGHT: 183 LBS | DIASTOLIC BLOOD PRESSURE: 71 MMHG | SYSTOLIC BLOOD PRESSURE: 117 MMHG

## 2017-06-28 DIAGNOSIS — M47.816 LUMBAR FACET ARTHROPATHY: ICD-10-CM

## 2017-06-28 DIAGNOSIS — M51.36 LUMBAR DEGENERATIVE DISC DISEASE: ICD-10-CM

## 2017-06-28 DIAGNOSIS — M47.812 CERVICAL FACET SYNDROME: ICD-10-CM

## 2017-06-28 DIAGNOSIS — M47.816 SPONDYLOSIS OF LUMBAR REGION WITHOUT MYELOPATHY OR RADICULOPATHY: Primary | ICD-10-CM

## 2017-06-28 DIAGNOSIS — G89.4 CHRONIC PAIN SYNDROME: ICD-10-CM

## 2017-06-28 DIAGNOSIS — M47.812 SPONDYLOSIS OF CERVICAL REGION WITHOUT MYELOPATHY OR RADICULOPATHY: ICD-10-CM

## 2017-06-28 NOTE — PROGRESS NOTES
Nursing Notes    Patient presents to the office today in follow-up. Patient rates her pain at 4/10 on the numerical pain scale. Comments: Patient is here for a consult for procedure. Patient has fibromyalgia and is allergic to the medications   She is also allergic to the IV dye she states. Patient brought her disk to the appt today. In her chart is a report of her imaging. POC UDS was not performed in office today    Any new labs or imaging since last appointment? YES MRI of neck at [facility]     Have you been to an emergency room (ER) or urgent care clinic since your last visit? NO            Have you been hospitalized since your last visit? NO     If yes, where, when, and reason for visit? Ms. Rolanda Boyce has a reminder for a \"due or due soon\" health maintenance. I have asked that she contact her primary care provider for follow-up on this health maintenance. Have you seen or consulted any other health care providers outside of the [address] [place] [address]  since your last visit? NO     If yes, where, when, and reason for visit?

## 2017-06-28 NOTE — PROGRESS NOTES
[facility]  Interventional Pain Management Consultation History & Physical    PATIENT NAME:  Edison Mack     YOB: 1950    DATE OF SERVICE:   6/28/2017      CHIEF COMPLAINT:  Back Pain; Neck Pain; and Hip Pain      REASON FOR VISIT:   Edison Mack presents to the pain clinic today for initial evaluation and to consider interventional pain management options as indicated for the type and location of the pain the patient is presenting with. HISTORY OF PRESENT ILLNESS: Patient presents for initial evaluation and consideration for interventional procedures as indicated. She is referred to us by Dr. Jackqulyn Gilford for consideration for both cervical as well as lumbar radiofrequency neurotomy procedures as indicated per       Patient endorses chronic low back pain since 1996. In 1996, while at work, she was lifting and turning and twisting while lifting an object. She hurt her back at that time. It is been hurting off and on ever since that time. She endorses an aching and hurting pain of her low back. Pain is intermittent, sometimes exceedingly debilitating, sometimes less so. Pain is increased with lumbar facet loading maneuvers including lumbar twisting and turning, extension and flexion of her lumbar spine. Pain can be increased with leaning over while doing dishes and other household chores. He further endorses lumbar axial spinal maneuvers that increase her pain including prolonged sitting standing and walking. She denies radicular symptoms. Pain refers to both buttocks as well as both lateral hips, left side more than right side. She states that she has had lumbar spinal injections of some sort by another physician, they helped for a couple of weeks only. She takes Vicodin 5/325 mg 1, to 3 tablets a day as needed. Over-the-counter pain medications also help to some extent.   She is tried extensive physical therapy in the past. She is tried actually extensive physical therapy but was sessions lasting anywhere from 6-8 weeks up to almost a year. Physical therapy initially helped her, but less so now. She has not had any previous low back surgery. She is not currently taking blood thinners. She has also had chronic neck pain, perhaps 15 years duration. She denies any accident injury or antecedent trauma. She endorses a constant hurting aching of her both sides of her neck. Pain travels to both of her shoulders, upper shoulders. Pain travels down along her neck to both of her upper shoulders. Pain is increased with turning her head to either side especially to the left, pain is also increased with other cervical facet loading maneuvers including cervical extension and to some extent cervical flexion. Pain is increased with cervical facet loading maneuver including lateral twisting and looking to either direction. She denies radicular symptoms. She has not had any previous neck surgery. Again, she is not on any blood thinners. She has had extensive physical therapy for her neck pain. She has also had injections of different sorts. Again they have offered temporary improvement. By review of available medical records, progress note dated Stephanie 15, 2017 by Dr. Marylin Rehman is reviewed. Patient has chronic neck and low back pain. Pain refers to both shoulders. She has peripheral neuropathy. Fibromyalgia. Insomnia. We reviewed MRI of her cervical spine dated June 9, 2017. This is significant for small disc protrusions C3-4 C4-5 C5-6 C6-7. Cervical facet arthropathy is noted. Disc osteophyte complexes are noted. Ligamenta flava bulging is noted. Grade 1 anterolisthesis T1 on T2 is noted. Overall impression multilevel mild degenerative disc disease. Facet arthropathy and hypertrophy is noted. Mild spinal stenosis C5-6 and C6-7. We reviewed her lumbar MRI dated October 8, 2016.   This is significant for disc bulging and disc protrusions at several levels. Facet arthropathy and hypertrophy is noted. Ligamenta flava thickening is noted. Anterolisthesis L4 on L5 is noted. Mild to moderate neuroforaminal narrowing at several levels as noted. Severe left-sided neuroforaminal narrowing L3-4 is noted. ASSESSMENT/OPTIONS: as follows. We discussed options. Patient presents with chronic low back as well as neck pain long-standing duration. She endorses axial and paraspinal neck and low back pain. She endorses facet loading pain of both her neck as well as her low back with facet loading maneuvers such as twisting and turning either her neck or low back, extension or flexion of neck or low back reproducing her neck or low back symptoms. Her imaging demonstrates facet arthropathy and hypertrophy in addition to other imaging findings. No significant neural compression or impingement is otherwise noted. Her primary pain complaint is her low back pain. I believe she will respond well to lumbar radiofrequency neurotomy procedures at bilateral L3-4, L4-5, L5-S1 levels. I have discussed the risks and benefits, indications, contraindications, and side effects of intended procedure with the patient. I have used skeleton spine model to describe and discuss the procedure with the patient. I have answered all questions relating to the procedure. Patient understands the nature of the procedure and wishes to proceed. Patient has no further questions. After her lumbar radiofrequency neurotomy procedures are completed, we can then turned our attention to cervical radiofrequency procedures as indicated.   She understands and wishes to proceed                 MRI Results (most recent):    Results from [place] encounter on 06/09/17   MRI CERV SPINE WO CONT   Narrative EXAMINATION: MRI cervical spine without contrast    INDICATION: Chronic neck pain, bilateral upper extremity paresthesia    COMPARISON: None    TECHNIQUE: Sagittal and axial multiecho sequences of the cervical spine  performed without contrast.    FINDINGS:    General: Vertebral body and disc space heights are preserved. No significant  endplate changes. No focal listhesis. Lordosis maintained. No evidence of  abnormal signal in the cervical cord. No abnormal epidural collection  identified. No suspicious marrow lesions. Miscellaneous: Surrounding soft tissues unremarkable. Levels:    Craniocervical junction: No significant degenerative change or stenosis. C2-C3: Minimal uncovertebral spurring. Focal ligamentum flavum bulge right of  midline minimally impressing the right dorsal cord. Facets otherwise  unremarkable. Overall spinal canal patent. Foramina patent. C3-C4: Small central disc protrusion. Mild left facet arthropathy. Spinal canal  patent. Foramina patent. C4-C5: Small central disc protrusion. Mild bilateral facet arthropathy. Spinal  canal patent. Foramina patent. C5-C6: Posterior annular tear with small central disc protrusion. Mild bilateral  facet arthropathy with ligamentum flavum bulge. Mild spinal canal stenosis. Foramina patent. C6-C7: Small central disc protrusion with mild disc osteophyte bulge. Mild  bilateral uncovertebral spurring. Mild bilateral facet arthropathy. Mild spinal  canal stenosis. Foramina patent. C7-T1: No significant disc disease. Moderate facet arthropathy. Spinal canal  stenosis. Mild foraminal stenoses. T1-T2: Evaluated sagittal plane only. Grade 1 anterolisthesis. Moderate  bilateral facet arthropathy. Spinal canal patent. Mild bilateral foraminal  stenoses. T2-T3: Evaluated sagittal plane only. Grade 1 anterolisthesis. Moderate facet  arthropathy. Spinal canal patent. Mild foraminal stenoses. T3-T4, T4-T5: Evaluated sagittal plane only. No stenosis. Impression IMPRESSION:    1. Multilevel mild degenerative disc disease.  Notable facet arthropathy along  cervicothoracic junction. See level by level details above. -Mild spinal canal stenosis at C5-C6 and C6-C7 with minimal impression upon the  ventral cord at C5-C6. Focal right of midline ligamentum flavum bulge at C2-C3  mildly impressing the dorsal cord. -Mild multilevel foraminal stenoses. -Findings are similar to 2013 MRI. Disc disease appears to have progressed at  C6-C7 level. 2. Please see upper thoracic findings above also. PAST MEDICAL HISTORY:   The patient  has a past medical history of Arthritis; Chronic lung disease; Chronic obstructive pulmonary disease ([address] Descending aortic aneurysm ([facility] [address] Fibromyalgia; Hypercholesterolemia; Hypertension; Ill-defined condition; and Sleep apnea. PAST SURGICAL HISTORY:   The patient  has a past surgical history that includes gyn; appendectomy; and cataract removal.    CURRENT MEDICATIONS:   The patient has a current medication list which includes the following prescription(s): oxygen-air delivery systems, flaxseed oil, ergocalciferol, nifedipine er, atenolol, losartan, atorvastatin, pantoprazole, zolpidem, levocetirizine, fluticasone, montelukast, multivitamin, calcium-cholecalciferol (d3), aspirin delayed-release, vitamin b complex, magnesium carbonate, potassium, and methocarbamol. ALLERGIES:     Allergies   Allergen Reactions    Benadryl [Diphenhydramine Hcl] Hives    Codeine Itching    Cymbalta [Duloxetine] Other (comments)     Shaking, feeling someone living inside my body, arms going numb.  Iodinated Contrast- Oral And Iv Dye Hives     Pt states severe hives     Levaquin [Levofloxacin] Nausea Only    Lyrica [Pregabalin] Other (comments)     Shaking, feeling someone living in my body, numb arm. Any meds for fibromyialga    Other Medication Swelling     Pt wrote having an allergic reaction to \"tetnus\".  \"Extreme swelling at site\"     Sulfur Hives       FAMILY HISTORY:   The patient family history includes Cancer in her father; Diabetes in her mother; Hypertension in her mother; Stroke in her father. SOCIAL HISTORY:   The patient  reports that she quit smoking about 3 years ago. Her smoking use included Cigarettes. She has a 45.00 pack-year smoking history. She does not have any smokeless tobacco history on file. The patient  reports that she does not drink alcohol. She also  reports that she does not use illicit drugs. REVIEW OF SYSTEMS:    The patient denies fever, chills, weight loss (Constitutional), rash, itching (Skin), tinnitus, congestion (HENT), blurred vision, photophobia (Eyes), palpitations, orthopnea (Cardiovascular), hemoptysis, wheezing (Respiratory), nausea, vomiting, diarrhea (Gastrointestinal), dysuria, hematuria, urgency (Genitourinary), bowel or bladder incontinence, loss of consciousness (Neurologic), suicidal or homicidal ideation or hallucinations (Psychiatric). Denies swelling, axillary or groin masses (Lymphatic). PHYSICAL EXAM:  VS:   Visit Vitals    /71    Pulse (!) 53    Wt 83 kg (183 lb)    BMI 32.42 kg/m2     General: Well-developed and well-nourished. Body habitus consistent with recorded height and weight and the calculated BMI. Apparent distress due to neck and low back pain. Head: Normocephalic, atraumatic. Skin: Inspection of the skin reveals no rashes, lesions or infection. CV: Regular rate. No murmurs or rubs noted. No peripheral edema noted. Pulm: Respirations are even and unlabored. Extr: No clubbing, cyanosis, or edema noted. Musculoskeletal:  1. Cervical spine -decreased range of motion all axes . Paraspinous tenderness bilateral .   There is no scoliosis, asymmetry, or musculoskeletal defect. 2. Thoracic spine - Full ROM. No paraspinous tenderness at any level. There is no scoliosis, asymmetry, or musculoskeletal defect. 3. Lumbar spine -decreased range of motion all axes . Paraspinous tenderness throughout the lumbar spine .   SI joints are nontender bilaterally. There is no scoliosis, asymmetry, or musculoskeletal defect. 4. Right upper extremity - Full ROM. 5/5 muscle strength in all muscle groups. No pain or tenderness in shoulder, elbow, wrist, or hand. 5. Left upper extremity - Full ROM. 5/5 muscle strength in all muscle groups. No pain or tenderness in shoulder, elbow, wrist, or hand. 6. Right lower extremity - Full ROM. 5/5 muscle strength in all muscle groups. No pain, tenderness, or swelling in the hip, knee, ankle or foot. 7. Left lower extremity - Full ROM. 5/5 muscle strength in all muscle groups. No pain, tenderness, or swelling in the hip, knee, ankle or foot. Neurological:  1. Mental Status - Alert, awake and oriented. Speech is clear and appropriate. 2. Cranial Nerves - Extraocular muscles intact bilaterally. Cranial nerves II-XII grossly intact bilaterally. 3. Gait - antalgic   4. Reflexes - 2+ and symmetric throughout. 5. Sensation - Intact to light touch and pin prick. 6. Provocative Tests - Spurlings negative bilaterally. Straight leg raise negative bilaterally. Psychological:  1. Mood and affect - Appropriate. 2. Speech - Appropriate. 3. Though content - Appropriate. 4. Judgment - Appropriate. ASSESSMENT:      ICD-10-CM ICD-9-CM    1. Spondylosis of lumbar region without myelopathy or radiculopathy M47.816 721.3    2. Lumbar facet arthropathy (HCC) M12.88 721.3    3. Lumbar degenerative disc disease M51.36 722.52    4. Chronic pain syndrome G89.4 338.4    5. Spondylosis of cervical region without myelopathy or radiculopathy M47.812 721.0    6. Cervical facet syndrome M12.88 723.8            PLAN:    1.    I have thoroughly discussed the risks and benefits, indications, contraindications, and side effects of any and procedures that were mentioned at today's patient visit.  I have used a skeleton model to explain all procedures, as well as to provide added emphasis regarding procedures and as well for patient education purposes. I have answered all questions in great detail, and I have obtained verbal confirmation for all procedures planned with the patient. 3.    I have reviewed in great detail today the patient's MRI and other imaging studies with the patient. I have explained to the patient their condition using both actual recent and relevant images insofar as I am able to obtain actual images. I have used a skeleton model for added emphasis as well as patient education. 4.    I have advised patient to have a primary care provider continue to care for their health maintenance and general medical conditions. 5,    I have placed appropriate referrals to specialty care providers as I have deemed necessary through today's clinical consultation with the patient. 5.    I have explained to the patient that if any significant side effects, issues, problems, concerns, or perceived complications may have arisen at around the time of the patient's procedures, they should either call the pain management clinic or go to the emergency room immediately for medical provider evaluation. 6.   I have encouraged all patients to call the pain management clinic with any questions or concerns that they may have pertaining to their procedures. DISPOSITION:   The patients condition and plan were discussed at length and all questions were answered. The patient agrees with the plan. A total of 45 minutes was spent with the patient of which over half of the time was spent counseling the patient. Reji Lang MD 6/28/2017 3:38 PM    Note: Although these clinic notes were documented by the provider at the time of the exam, they have not been proofed and are subject to transcription variance.

## 2017-06-30 ENCOUNTER — ANESTHESIA EVENT (OUTPATIENT)
Dept: ENDOSCOPY | Age: 67
End: 2017-06-30
Payer: MEDICARE

## 2017-07-03 ENCOUNTER — ANESTHESIA (OUTPATIENT)
Dept: ENDOSCOPY | Age: 67
End: 2017-07-03
Payer: MEDICARE

## 2017-07-03 ENCOUNTER — HOSPITAL ENCOUNTER (OUTPATIENT)
Age: 67
Setting detail: OUTPATIENT SURGERY
Discharge: HOME | End: 2017-07-03
Attending: INTERNAL MEDICINE | Admitting: INTERNAL MEDICINE
Payer: MEDICARE

## 2017-07-03 PROCEDURE — 74011000250 HC RX REV CODE- 250: Performed by: NURSE ANESTHETIST, CERTIFIED REGISTERED

## 2017-07-03 PROCEDURE — 74011000250 HC RX REV CODE- 250

## 2017-07-03 PROCEDURE — 76060000032 HC ANESTHESIA 0.5 TO 1 HR: Performed by: INTERNAL MEDICINE

## 2017-07-03 PROCEDURE — 74011250636 HC RX REV CODE- 250/636

## 2017-07-03 PROCEDURE — 88305 TISSUE EXAM BY PATHOLOGIST: CPT | Performed by: INTERNAL MEDICINE

## 2017-07-03 PROCEDURE — 77030018846 HC SOL IRR STRL H20 ICUM -A: Performed by: INTERNAL MEDICINE

## 2017-07-03 PROCEDURE — 77030008565 HC TBNG SUC IRR ERBE -B: Performed by: INTERNAL MEDICINE

## 2017-07-03 PROCEDURE — 76040000007: Performed by: INTERNAL MEDICINE

## 2017-07-03 PROCEDURE — 77030019988 HC FCPS ENDOSC DISP BSC -B: Performed by: INTERNAL MEDICINE

## 2017-07-03 PROCEDURE — 77030013992 HC SNR POLYP ENDOSC BSC -B: Performed by: INTERNAL MEDICINE

## 2017-07-03 PROCEDURE — 77030009426 HC FCPS BIOP ENDOSC BSC -B: Performed by: INTERNAL MEDICINE

## 2017-07-03 PROCEDURE — 74011250636 HC RX REV CODE- 250/636: Performed by: NURSE ANESTHETIST, CERTIFIED REGISTERED

## 2017-07-03 RX ORDER — DIAZEPAM 5 MG/1
10 TABLET ORAL ONCE
Status: CANCELLED | OUTPATIENT
Start: 2017-07-05 | End: 2017-07-05

## 2017-07-03 RX ORDER — SODIUM CHLORIDE, SODIUM LACTATE, POTASSIUM CHLORIDE, CALCIUM CHLORIDE 600; 310; 30; 20 MG/100ML; MG/100ML; MG/100ML; MG/100ML
INJECTION, SOLUTION INTRAVENOUS
Status: DISCONTINUED | OUTPATIENT
Start: 2017-07-03 | End: 2017-07-03 | Stop reason: HOSPADM

## 2017-07-03 RX ORDER — PROPOFOL 10 MG/ML
INJECTION, EMULSION INTRAVENOUS AS NEEDED
Status: DISCONTINUED | OUTPATIENT
Start: 2017-07-03 | End: 2017-07-03 | Stop reason: HOSPADM

## 2017-07-03 RX ORDER — SODIUM CHLORIDE 0.9 % (FLUSH) 0.9 %
5-10 SYRINGE (ML) INJECTION EVERY 8 HOURS
Status: DISCONTINUED | OUTPATIENT
Start: 2017-07-03 | End: 2017-07-03 | Stop reason: HOSPADM

## 2017-07-03 RX ORDER — LIDOCAINE HYDROCHLORIDE 20 MG/ML
INJECTION, SOLUTION EPIDURAL; INFILTRATION; INTRACAUDAL; PERINEURAL AS NEEDED
Status: DISCONTINUED | OUTPATIENT
Start: 2017-07-03 | End: 2017-07-03 | Stop reason: HOSPADM

## 2017-07-03 RX ORDER — SODIUM CHLORIDE, SODIUM LACTATE, POTASSIUM CHLORIDE, CALCIUM CHLORIDE 600; 310; 30; 20 MG/100ML; MG/100ML; MG/100ML; MG/100ML
75 INJECTION, SOLUTION INTRAVENOUS CONTINUOUS
Status: DISCONTINUED | OUTPATIENT
Start: 2017-07-03 | End: 2017-07-03 | Stop reason: HOSPADM

## 2017-07-03 RX ORDER — SODIUM CHLORIDE 0.9 % (FLUSH) 0.9 %
5-10 SYRINGE (ML) INJECTION AS NEEDED
Status: DISCONTINUED | OUTPATIENT
Start: 2017-07-03 | End: 2017-07-03 | Stop reason: HOSPADM

## 2017-07-03 RX ADMIN — PROPOFOL 10 MG: 10 INJECTION, EMULSION INTRAVENOUS at 12:25

## 2017-07-03 RX ADMIN — SODIUM CHLORIDE, SODIUM LACTATE, POTASSIUM CHLORIDE, AND CALCIUM CHLORIDE 75 ML/HR: 600; 310; 30; 20 INJECTION, SOLUTION INTRAVENOUS at 11:13

## 2017-07-03 RX ADMIN — FAMOTIDINE 20 MG: 10 INJECTION, SOLUTION INTRAVENOUS at 11:13

## 2017-07-03 RX ADMIN — PROPOFOL 10 MG: 10 INJECTION, EMULSION INTRAVENOUS at 12:26

## 2017-07-03 RX ADMIN — LIDOCAINE HYDROCHLORIDE 40 MG: 20 INJECTION, SOLUTION EPIDURAL; INFILTRATION; INTRACAUDAL; PERINEURAL at 12:03

## 2017-07-03 RX ADMIN — PROPOFOL 30 MG: 10 INJECTION, EMULSION INTRAVENOUS at 12:18

## 2017-07-03 RX ADMIN — PROPOFOL 30 MG: 10 INJECTION, EMULSION INTRAVENOUS at 12:12

## 2017-07-03 RX ADMIN — PROPOFOL 20 MG: 10 INJECTION, EMULSION INTRAVENOUS at 12:22

## 2017-07-03 RX ADMIN — SODIUM CHLORIDE, SODIUM LACTATE, POTASSIUM CHLORIDE, CALCIUM CHLORIDE: 600; 310; 30; 20 INJECTION, SOLUTION INTRAVENOUS at 12:02

## 2017-07-03 RX ADMIN — PROPOFOL 50 MG: 10 INJECTION, EMULSION INTRAVENOUS at 12:07

## 2017-07-03 RX ADMIN — PROPOFOL 50 MG: 10 INJECTION, EMULSION INTRAVENOUS at 12:03

## 2017-07-03 NOTE — H&P
[email or web address]  [phone]    GASTROENTEROLOGY Pre-Procedure H and P      Impression/Plan:   1. This patient is consented for an EGD and colonoscopy for dysphagia and colon polyps       Chief Complaint: dysphagia and colon polyps    HPI:  Echo Bender is a 78 y.o. female who is being is having an EGD and colonoscopy dysphagia and colon polyps  PMH:   Past Medical History:   Diagnosis Date    Arthritis     Chronic lung disease     Chronic obstructive pulmonary disease ([address]     Descending aortic aneurysm (HCC)     Fibromyalgia     Hypercholesterolemia     Hypertension     Ill-defined condition     Sleep apnea     mouth guard, oxygen HS       PSH:   Past Surgical History:   Procedure Laterality Date    HX APPENDECTOMY      HX CATARACT REMOVAL      HX GYN         Social HX:   Social History     Social History    Marital status:      Spouse name: N/A    Number of children: N/A    Years of education: N/A     Occupational History    Not on file. Social History Main Topics    Smoking status: Former Smoker     Packs/day: 1.00     Years: 45.00     Types: Cigarettes     Quit date: 1/1/2014    Smokeless tobacco: Not on file    Alcohol use No    Drug use: No    Sexual activity: Not on file     Other Topics Concern    Not on file     Social History Narrative       FHX:   Family History   Problem Relation Age of Onset    Hypertension Mother     Diabetes Mother     Cancer Father     Stroke Father        Allergy:   Allergies   Allergen Reactions    Benadryl [Diphenhydramine Hcl] Hives    Codeine Itching    Cymbalta [Duloxetine] Other (comments)     Shaking, feeling someone living inside my body, arms going numb.  Iodinated Contrast- Oral And Iv Dye Hives     Pt states severe hives     Levaquin [Levofloxacin] Nausea Only    Lyrica [Pregabalin] Other (comments)     Shaking, feeling someone living in my body, numb arm.   Any meds for fibromyialga    Other Medication Swelling     Pt wrote having an allergic reaction to \"tetnus\". \"Extreme swelling at site\"     Sulfur Hives       Home Medications:     Prescriptions Prior to Admission   Medication Sig    OXYGEN-AIR DELIVERY SYSTEMS 2 L/min by Does Not Apply route nightly.  Flaxseed Oil oil by Does Not Apply route.  methocarbamol (ROBAXIN) 500 mg tablet TAKE 1/2-1 TAB PO TID PRN SPASMS/PAIN    ergocalciferol (ERGOCALCIFEROL) 50,000 unit capsule     NIFEdipine ER (PROCARDIA XL) 30 mg ER tablet Take 30 mg by mouth daily.  atenolol (TENORMIN) 50 mg tablet Take  by mouth two (2) times a day.  losartan (COZAAR) 100 mg tablet Take 100 mg by mouth nightly.  atorvastatin (LIPITOR) 40 mg tablet Take  by mouth daily.  pantoprazole (PROTONIX) 40 mg tablet Take 40 mg by mouth two (2) times a day.  zolpidem (AMBIEN) 10 mg tablet Take  by mouth nightly as needed for Sleep.  levocetirizine (XYZAL) 5 mg tablet Take  by mouth daily.  fluticasone (FLONASE) 50 mcg/actuation nasal spray two (2) times daily as needed.  montelukast (SINGULAIR) 10 mg tablet Take 10 mg by mouth nightly.  multivitamin (ONE A DAY) tablet Take 1 Tab by mouth daily.  calcium-cholecalciferol, d3, (CALCIUM 600 + D) 600-125 mg-unit tab Take  by mouth daily.  aspirin delayed-release 81 mg tablet Take  by mouth every other day.  VITAMIN B COMPLEX (B COMPLETE PO) Take  by mouth daily.  MAGNESIUM CARBONATE PO Take  by mouth daily.  potassium 99 mg tablet Take 45.5 mg by mouth daily. Review of Systems:     Constitutional: No fevers, chills, weight loss, fatigue. Skin: No rashes, pruritis, jaundice, ulcerations, erythema. HENT: No headaches, nosebleeds, sinus pressure, rhinorrhea, sore throat. Eyes: No visual changes, blurred vision, eye pain, photophobia, jaundice. Cardiovascular: No chest pain, heart palpitations. Respiratory: No cough, SOB, wheezing, chest discomfort, orthopnea.    Gastrointestinal:    Genitourinary: No dysuria, bleeding, discharge, pyuria. Musculoskeletal: No weakness, arthralgias, wasting. Endo: No sweats. Heme: No bruising, easy bleeding. Allergies: As noted. Neurological: Cranial nerves intact. Alert and oriented. Gait not assessed. Psychiatric:  No anxiety, depression, hallucinations. Visit Vitals    /73    Pulse 61    Temp 98.2 °F (36.8 °C)    Resp 16    Ht 5' 3\" (1.6 m)    Wt 82.1 kg (181 lb)    SpO2 96%    Breastfeeding No    BMI 32.06 kg/m2       Physical Assessment:     constitutional: appearance: well developed, well nourished, normal habitus, no deformities, in no acute distress. skin: inspection: no rashes, ulcers, icterus or other lesions; no clubbing or telangiectasias. palpation: no induration or subcutaneos nodules. eyes: inspection: normal conjunctivae and lids; no jaundice pupils: normal  ENMT: mouth: normal oral mucosa,lips and gums; good dentition. oropharynx: normal tongue, hard and soft palate; posterior pharynx without erithema, exudate or lesions. neck: thyroid: normal size, consistency and position; no masses or tenderness. respiratory: effort: normal chest excursion; no intercostal retraction or accessory muscle use. cardiovascular: abdominal aorta: normal size and position; no bruits. palpation: PMI of normal size and position; normal rhythm; no thrill or murmurs. abdominal: abdomen: normal consistency; no tenderness or masses. hernias: no hernias appreciated. liver: normal size and consistency. spleen: not palpable. rectal: hemoccult/guaiac: not performed. musculoskeletal: digits and nails: no clubbing, cyanosis, petechiae or other inflammatory conditions. gait: normal gait and station head and neck: normal range of motion; no pain, crepitation or contracture. spine/ribs/pelvis: normal range of motion; no pain, deformity or contracture. neurologic: cranial nerves: II-XII normal.   psychiatric: judgement/insight: within normal limits.  memory: within normal limits for recent and remote events. mood and affect: no evidence of depression, anxiety or agitation. orientation: oriented to time, space and person. Basic Metabolic Profile   No results for input(s): NA, K, CL, CO2, BUN, GLU, CA, MG, PHOS in the last 72 hours. No lab exists for component: CREAT      CBC w/Diff    No results for input(s): WBC, RBC, HGB, HCT, MCV, MCH, MCHC, RDW, PLT, HGBEXT, HCTEXT, PLTEXT in the last 72 hours. No lab exists for component: MPV No results for input(s): GRANS, LYMPH, EOS, PRO, MYELO, METAS, BLAST in the last 72 hours. No lab exists for component: MONO, BASO     Hepatic Function   No results for input(s): ALB, TP, TBILI, GPT, SGOT, AP, AML, LPSE in the last 72 hours. No lab exists for component: DBILI     Coags   No results for input(s): PTP, INR, APTT in the last 72 hours. No lab exists for component: Juan Antonio Samayoa MD  [facility] [address], [facility]  Cell: [phone]  Direct pager: [phone]  [email or web address]  [email or web address]

## 2017-07-03 NOTE — DISCHARGE INSTRUCTIONS
Colonoscopy: What to Expect at [address] [place] [address]  After you have a colonoscopy, you will stay at the clinic for 1 to 2 hours until the medicines wear off. Then you can go home. But you will need to arrange for a ride. Your doctor will tell you when you can eat and do your other usual activities. Your doctor will talk to you about when you will need your next colonoscopy. Your doctor can help you decide how often you need to be checked. This will depend on the results of your test and your risk for colorectal cancer. After the test, you may be bloated or have gas pains. You may need to pass gas. If a biopsy was done or a polyp was removed, you may have streaks of blood in your stool (feces) for a few days. This care sheet gives you a general idea about how long it will take for you to recover. But each person recovers at a different pace. Follow the steps below to get better as quickly as possible. How can you care for yourself at home? Activity  · Rest when you feel tired. · You can do your normal activities when it feels okay to do so. Diet  · Follow your doctor's directions for eating. · Unless your doctor has told you not to, drink plenty of fluids. This helps to replace the fluids that were lost during the colon prep. · Do not drink alcohol. Medicines  · Your doctor will tell you if and when you can restart your medicines. He or she will also give you instructions about taking any new medicines. · If you take blood thinners, such as warfarin (Coumadin), clopidogrel (Plavix), or aspirin, be sure to talk to your doctor. He or she will tell you if and when to start taking those medicines again. Make sure that you understand exactly what your doctor wants you to do. · If polyps were removed or a biopsy was done during the test, your doctor may tell you not to take aspirin or other anti-inflammatory medicines for a few days. These include ibuprofen (Advil, Motrin) and naproxen (Aleve).   Other instructions  · For your safety, do not drive or operate machinery until the medicine wears off and you can think clearly. Your doctor may tell you not to drive or operate machinery until the day after your test.  · Do not sign legal documents or make major decisions until the medicine wears off and you can think clearly. The anesthesia can make it hard for you to fully understand what you are agreeing to. Follow-up care is a key part of your treatment and safety. Be sure to make and go to all appointments, and call your doctor if you are having problems. It's also a good idea to know your test results and keep a list of the medicines you take. When should you call for help? Call 911 anytime you think you may need emergency care. For example, call if:  · You passed out (lost consciousness). · You pass maroon or bloody stools. · You have severe belly pain. Call your doctor now or seek immediate medical care if:  · Your stools are black and tarlike. · Your stools have streaks of blood, but you did not have a biopsy or any polyps removed. · You have belly pain, or your belly is swollen and firm. · You vomit. · You have a fever. · You are very dizzy. Watch closely for changes in your health, and be sure to contact your doctor if you have any problems. Where can you learn more? Go to http://ian-tony.info/. Enter E264 in the search box to learn more about \"Colonoscopy: What to Expect at Home. \"  Current as of: August 9, 2016  Content Version: 11.3  © 1127-9354 [employer]. Care instructions adapted under license by [employer] (which disclaims liability or warranty for this information). If you have questions about a medical condition or this instruction, always ask your healthcare professional. [place] [address] any warranty or liability for your use of this information.        Upper GI Endoscopy: What to Expect at [address] [facility]  After you have an endoscopy, you will stay at the hospital or clinic for 1 to 2 hours. This will allow the medicine to wear off. You will be able to go home after your doctor or nurse checks to make sure you are not having any problems. You may have to stay overnight if you had treatment during the test. You may have a sore throat for a day or two after the test.  This care sheet gives you a general idea about what to expect after the test.  How can you care for yourself at home? Activity  · Rest as much as you need to after you go home. · You should be able to go back to your usual activities the day after the test.  Diet  · Follow your doctor's directions for eating after the test.  · Drink plenty of fluids (unless your doctor has told you not to). Medications  · If you have a sore throat the day after the test, use an over-the-counter spray to numb your throat. Follow-up care is a key part of your treatment and safety. Be sure to make and go to all appointments, and call your doctor if you are having problems. It's also a good idea to know your test results and keep a list of the medicines you take. When should you call for help? Call 911 anytime you think you may need emergency care. For example, call if:  · You passed out (lost consciousness). · You cough up blood. · You vomit blood or what looks like coffee grounds. · You pass maroon or very bloody stools. Call your doctor now or seek immediate medical care if:  · You have trouble swallowing. · You have belly pain. · Your stools are black and tarlike or have streaks of blood. · You are sick to your stomach or cannot keep fluids down. Watch closely for changes in your health, and be sure to contact your doctor if:  · Your throat still hurts after a day or two. · You do not get better as expected. Where can you learn more? Go to http://ian-tony.info/.   Enter [phone] in the search box to learn more about \"Upper GI Endoscopy: What to Expect at Home. \"  Current as of: August 9, 2016  Content Version: 11.3  © 9824-9057 [employer]. Care instructions adapted under license by [employer] (which disclaims liability or warranty for this information). If you have questions about a medical condition or this instruction, always ask your healthcare professional. [address] any warranty or liability for your use of this information. DISCHARGE SUMMARY from Nurse    The following personal items are in your possession at time of discharge:    Dental Appliances: None  Visual Aid: None                            PATIENT INSTRUCTIONS:      These are general instructions for a healthy lifestyle:    No smoking/ No tobacco products/ Avoid exposure to second hand smoke    Surgeon General's Warning:  Quitting smoking now greatly reduces serious risk to your health. Obesity, smoking, and sedentary lifestyle greatly increases your risk for illness    A healthy diet, regular physical exercise & weight monitoring are important for maintaining a healthy lifestyle    You may be retaining fluid if you have a history of heart failure or if you experience any of the following symptoms:  Weight gain of 3 pounds or more overnight or 5 pounds in a week, increased swelling in our hands or feet or shortness of breath while lying flat in bed. Please call your doctor as soon as you notice any of these symptoms; do not wait until your next office visit. Recognize signs and symptoms of STROKE:    F-face looks uneven    A-arms unable to move or move unevenly    S-speech slurred or non-existent    T-time-call 911 as soon as signs and symptoms begin-DO NOT go       Back to bed or wait to see if you get better-TIME IS BRAIN. Warning Signs of HEART ATTACK     Call 911 if you have these symptoms:   Chest discomfort.  Most heart attacks involve discomfort in the center of the chest that lasts more than a few minutes, or that goes away and comes back. It can feel like uncomfortable pressure, squeezing, fullness, or pain.  Discomfort in other areas of the upper body. Symptoms can include pain or discomfort in one or both arms, the back, neck, jaw, or stomach.  Shortness of breath with or without chest discomfort.  Other signs may include breaking out in a cold sweat, nausea, or lightheadedness. Don't wait more than five minutes to call 911 - MINUTES MATTER! Fast action can save your life. Calling 911 is almost always the fastest way to get lifesaving treatment. Emergency Medical Services staff can begin treatment when they arrive -- up to an hour sooner than if someone gets to the hospital by car. The discharge information has been reviewed with the patient and spouse. The patient and spouse verbalized understanding. Discharge medications reviewed with the patient and spouse and appropriate educational materials and side effects teaching were provided.

## 2017-07-03 NOTE — PROGRESS NOTES
[email or web address]  [place], [address]      Brief Procedure Note    Delma Lloyd  1950  [id number]    Date of Procedure: 7/3/2017    Preoperative diagnosis: Abnormal swallowing [R13.10]  Gastroesophageal reflux disease, esophagitis presence not specified [K21.9]  Abdominal pain, vomiting, and diarrhea [R10.9, R11.10, R19.7]  Encounter for colonoscopy due to history of adenomatous colonic polyps [Z12.11, Z86.010]  Obstructive chronic bronchitis without exacerbation ([facility] [address] [J44.9]  Abdominal aortic aneurysm (AAA) without rupture ([facility] [address] [I71.4]    Postoperative diagnosis: hiatal hernia. Esophageal ring. hemorrhoids    Type of Anesthesia: MAC (Monitored anesthesia care)    Description of findings: same as post op dx    Procedure: Procedure(s):  COLONOSCOPY with biopsies  ENDOSCOPY with biopsies and dilitation     :  Dr. Alma Rosa Barry MD    Assistant(s): Endoscopy Technician-1: Dee Campbell CFVAFSA  Endoscopy Technician-2: Mariely Kumar  Endoscopy RN-1: Jonathan Benson; Geovanny Rowell RN    EBL:None    Specimens:   ID Type Source Tests Collected by Time Destination   1 : duodenum bx Preservative Duodenum  Alma Rosa Barry MD 7/3/2017 1211 Pathology   2 : random colon bx Preservative Colon  Alma Rosa Barry MD 7/3/2017 1221 Pathology       Findings: See printed and scanned procedure note    Complications: None    Dr. Alma Rosa Barry MD  7/3/2017  12:32 PM

## 2017-07-03 NOTE — ANESTHESIA PREPROCEDURE EVALUATION
Anesthetic History   No history of anesthetic complications            Review of Systems / Medical History  Patient summary reviewed, nursing notes reviewed and pertinent labs reviewed    Pulmonary    COPD: moderate    Sleep apnea           Neuro/Psych   Within defined limits           Cardiovascular    Hypertension                   GI/Hepatic/Renal  Within defined limits              Endo/Other        Arthritis     Other Findings   Comments:   Risk Factors for Postoperative nausea/vomiting:       History of postoperative nausea/vomiting? NO       Female? YES       Motion sickness? NO       Intended opioid administration for postoperative analgesia? NO      Smoking Abstinence  Current Smoker? NO  Elective Surgery? YES  Seen preoperatively by anesthesiologist or proxy prior to day of surgery? YES  Pt abstained from smoking 24 hours prior to anesthesia?  YES           Physical Exam    Airway  Mallampati: II  TM Distance: 4 - 6 cm  Neck ROM: normal range of motion   Mouth opening: Normal     Cardiovascular    Rhythm: regular  Rate: normal         Dental    Dentition: Full upper dentures and Lower partial plate     Pulmonary  Breath sounds clear to auscultation               Abdominal  GI exam deferred       Other Findings            Anesthetic Plan    ASA: 3  Anesthesia type: MAC          Induction: Intravenous  Anesthetic plan and risks discussed with: Patient

## 2017-07-03 NOTE — IP AVS SNAPSHOT
Melida Chen 
 
 
 [address] [facility] [address] [phone] Patient: Jay Avalos MRN: [id number] LNR:[id number] You are allergic to the following Allergen Reactions Benadryl (Diphenhydramine Hcl) Hives Codeine Itching Cymbalta (Duloxetine) Other (comments) Shaking, feeling someone living inside my body, arms going numb. Iodinated Contrast- Oral And Iv Dye Hives Pt states severe hives Levaquin (Levofloxacin) Nausea Only Lyrica (Pregabalin) Other (comments) Shaking, feeling someone living in my body, numb arm. Any meds for fibromyialga Other Medication Swelling Pt wrote having an allergic reaction to \"tetnus\". \"Extreme swelling at site\" Sulfur Hives Recent Documentation Height Weight Breastfeeding? BMI OB Status Smoking Status 1.6 m 82.1 kg No 32.06 kg/m2 Postmenopausal Former Smoker Emergency Contacts Name Discharge Info Relation Home Work Mobile [address] CAREGIVER [3] Spouse [3] [phone] PorfirioJoi  Child [2] About your hospitalization You were admitted on:  July 3, 2017 You last received care in the:  [address] [facility] PACU You were discharged on:  July 3, 2017 Unit phone number:  [phone] Why you were hospitalized Your primary diagnosis was:  Not on File Providers Seen During Your Hospitalizations Provider Role Specialty Primary office phone Pastor Julio MD Attending Provider Gastroenterology [phone] Your Primary Care Physician (PCP) Primary Care Physician Office Phone Office Fax Jonas Peraza [phone] Follow-up Information Follow up With Details Comments Contact Info [address] 
[address] [facility] [address] [phone] Current Discharge Medication List  
  
 CONTINUE these medications which have NOT CHANGED Dose & Instructions Dispensing Information Comments Morning Noon Evening Bedtime AMBIEN 10 mg tablet Generic drug:  zolpidem Your last dose was: Your next dose is: Take  by mouth nightly as needed for Sleep. Refills:  0  
     
   
   
   
  
 aspirin delayed-release 81 mg tablet Your last dose was: Your next dose is: Take  by mouth every other day. Refills:  0  
     
   
   
   
  
 B COMPLETE PO Your last dose was: Your next dose is: Take  by mouth daily. Refills:  0  
     
   
   
   
  
 CALCIUM 600 + D 600-125 mg-unit Tab Generic drug:  calcium-cholecalciferol (d3) Your last dose was: Your next dose is: Take  by mouth daily. Refills:  0  
     
   
   
   
  
 COZAAR 100 mg tablet Generic drug:  losartan Your last dose was: Your next dose is:    
   
   
 Dose:  100 mg Take 100 mg by mouth nightly. Refills:  0  
     
   
   
   
  
 ergocalciferol 50,000 unit capsule Commonly known as:  ERGOCALCIFEROL Your last dose was: Your next dose is:    
   
   
  Refills:  0 Flaxseed Oil Oil Your last dose was: Your next dose is:    
   
   
 by Does Not Apply route. Refills:  0  
     
   
   
   
  
 FLONASE 50 mcg/actuation nasal spray Generic drug:  fluticasone Your last dose was: Your next dose is:    
   
   
 two (2) times daily as needed. Refills:  0 LIPITOR 40 mg tablet Generic drug:  atorvastatin Your last dose was: Your next dose is: Take  by mouth daily. Refills:  0 MAGNESIUM CARBONATE PO Your last dose was: Your next dose is: Take  by mouth daily. Refills:  0  
     
   
   
   
  
 methocarbamol 500 mg tablet Commonly known as:  ROBAXIN Your last dose was: Your next dose is: TAKE 1/2-1 TAB PO TID PRN SPASMS/PAIN Quantity:  60 Tab Refills:  1  
     
   
   
   
  
 multivitamin tablet Commonly known as:  ONE A DAY Your last dose was: Your next dose is:    
   
   
 Dose:  1 Tab Take 1 Tab by mouth daily. Refills:  0 OXYGEN-AIR DELIVERY SYSTEMS Your last dose was: Your next dose is:    
   
   
 Dose:  2 L/min 2 L/min by Does Not Apply route nightly. Refills:  0  
     
   
   
   
  
 pantoprazole 40 mg tablet Commonly known as:  PROTONIX Your last dose was: Your next dose is:    
   
   
 Dose:  40 mg Take 40 mg by mouth two (2) times a day. Refills:  0  
     
   
   
   
  
 potassium 99 mg tablet Your last dose was: Your next dose is:    
   
   
 Dose:  45.5 mg Take 45.5 mg by mouth daily. Refills:  0 PROCARDIA XL 30 mg ER tablet Generic drug:  NIFEdipine ER Your last dose was: Your next dose is:    
   
   
 Dose:  30 mg Take 30 mg by mouth daily. Refills:  0 SINGULAIR 10 mg tablet Generic drug:  montelukast  
   
Your last dose was: Your next dose is:    
   
   
 Dose:  10 mg Take 10 mg by mouth nightly. Refills:  0  
     
   
   
   
  
 TENORMIN 50 mg tablet Generic drug:  atenolol Your last dose was: Your next dose is: Take  by mouth two (2) times a day. Refills:  0 XYZAL 5 mg tablet Generic drug:  levocetirizine Your last dose was: Your next dose is: Take  by mouth daily. Refills:  0 Discharge Instructions Colonoscopy: What to Expect at [facility] Your Recovery After you have a colonoscopy, you will stay at the clinic for 1 to 2 hours until the medicines wear off. Then you can go home. But you will need to arrange for a ride. Your doctor will tell you when you can eat and do your other usual activities. Your doctor will talk to you about when you will need your next colonoscopy. Your doctor can help you decide how often you need to be checked. This will depend on the results of your test and your risk for colorectal cancer. After the test, you may be bloated or have gas pains. You may need to pass gas. If a biopsy was done or a polyp was removed, you may have streaks of blood in your stool (feces) for a few days. This care sheet gives you a general idea about how long it will take for you to recover. But each person recovers at a different pace. Follow the steps below to get better as quickly as possible. How can you care for yourself at home? Activity · Rest when you feel tired. · You can do your normal activities when it feels okay to do so. Diet · Follow your doctor's directions for eating. · Unless your doctor has told you not to, drink plenty of fluids. This helps to replace the fluids that were lost during the colon prep. · Do not drink alcohol. Medicines · Your doctor will tell you if and when you can restart your medicines. He or she will also give you instructions about taking any new medicines. · If you take blood thinners, such as warfarin (Coumadin), clopidogrel (Plavix), or aspirin, be sure to talk to your doctor. He or she will tell you if and when to start taking those medicines again. Make sure that you understand exactly what your doctor wants you to do. · If polyps were removed or a biopsy was done during the test, your doctor may tell you not to take aspirin or other anti-inflammatory medicines for a few days. These include ibuprofen (Advil, Motrin) and naproxen (Aleve). Other instructions · For your safety, do not drive or operate machinery until the medicine wears off and you can think clearly. Your doctor may tell you not to drive or operate machinery until the day after your test. 
· Do not sign legal documents or make major decisions until the medicine wears off and you can think clearly. The anesthesia can make it hard for you to fully understand what you are agreeing to. Follow-up care is a key part of your treatment and safety. Be sure to make and go to all appointments, and call your doctor if you are having problems. It's also a good idea to know your test results and keep a list of the medicines you take. When should you call for help? Call 911 anytime you think you may need emergency care. For example, call if: 
· You passed out (lost consciousness). · You pass maroon or bloody stools. · You have severe belly pain. Call your doctor now or seek immediate medical care if: 
· Your stools are black and tarlike. · Your stools have streaks of blood, but you did not have a biopsy or any polyps removed. · You have belly pain, or your belly is swollen and firm. · You vomit. · You have a fever. · You are very dizzy. Watch closely for changes in your health, and be sure to contact your doctor if you have any problems. Where can you learn more? Go to http://ian-tony.info/. Enter E264 in the search box to learn more about \"Colonoscopy: What to Expect at Home. \" Current as of: August 9, 2016 Content Version: 11.3 © 0745-9510 [employer]. Care instructions adapted under license by [employer] (which disclaims liability or warranty for this information). If you have questions about a medical condition or this instruction, always ask your healthcare professional. [place] [address] any warranty or liability for your use of this information. Upper GI Endoscopy: What to Expect at [facility] Your Recovery After you have an endoscopy, you will stay at the hospital or clinic for 1 to 2 hours. This will allow the medicine to wear off. You will be able to go home after your doctor or nurse checks to make sure you are not having any problems. You may have to stay overnight if you had treatment during the test. You may have a sore throat for a day or two after the test. 
This care sheet gives you a general idea about what to expect after the test. 
How can you care for yourself at home? Activity · Rest as much as you need to after you go home. · You should be able to go back to your usual activities the day after the test. 
Diet · Follow your doctor's directions for eating after the test. 
· Drink plenty of fluids (unless your doctor has told you not to). Medications · If you have a sore throat the day after the test, use an over-the-counter spray to numb your throat. Follow-up care is a key part of your treatment and safety. Be sure to make and go to all appointments, and call your doctor if you are having problems. It's also a good idea to know your test results and keep a list of the medicines you take. When should you call for help? Call 911 anytime you think you may need emergency care. For example, call if: 
· You passed out (lost consciousness). · You cough up blood. · You vomit blood or what looks like coffee grounds. · You pass maroon or very bloody stools. Call your doctor now or seek immediate medical care if: 
· You have trouble swallowing. · You have belly pain. · Your stools are black and tarlike or have streaks of blood. · You are sick to your stomach or cannot keep fluids down. Watch closely for changes in your health, and be sure to contact your doctor if: 
· Your throat still hurts after a day or two. · You do not get better as expected. Where can you learn more? Go to http://ian-tony.info/. Enter (39) 801-127 in the search box to learn more about \"Upper GI Endoscopy: What to Expect at Home. \" Current as of: August 9, 2016 Content Version: 11.3 © 0582-9675 [employer]. Care instructions adapted under license by [employer] (which disclaims liability or warranty for this information). If you have questions about a medical condition or this instruction, always ask your healthcare professional. [address] any warranty or liability for your use of this information. DISCHARGE SUMMARY from Nurse The following personal items are in your possession at time of discharge: 
 
Dental Appliances: None Visual Aid: None PATIENT INSTRUCTIONS: 
 
 
These are general instructions for a healthy lifestyle: No smoking/ No tobacco products/ Avoid exposure to second hand smoke Surgeon General's Warning:  Quitting smoking now greatly reduces serious risk to your health. Obesity, smoking, and sedentary lifestyle greatly increases your risk for illness A healthy diet, regular physical exercise & weight monitoring are important for maintaining a healthy lifestyle You may be retaining fluid if you have a history of heart failure or if you experience any of the following symptoms:  Weight gain of 3 pounds or more overnight or 5 pounds in a week, increased swelling in our hands or feet or shortness of breath while lying flat in bed. Please call your doctor as soon as you notice any of these symptoms; do not wait until your next office visit. Recognize signs and symptoms of STROKE: 
 
F-face looks uneven A-arms unable to move or move unevenly S-speech slurred or non-existent T-time-call 911 as soon as signs and symptoms begin-DO NOT go Back to bed or wait to see if you get better-TIME IS BRAIN. Warning Signs of HEART ATTACK Call 911 if you have these symptoms: 
? Chest discomfort.  Most heart attacks involve discomfort in the center of the chest that lasts more than a few minutes, or that goes away and comes back. It can feel like uncomfortable pressure, squeezing, fullness, or pain. ? Discomfort in other areas of the upper body. Symptoms can include pain or discomfort in one or both arms, the back, neck, jaw, or stomach. ? Shortness of breath with or without chest discomfort. ? Other signs may include breaking out in a cold sweat, nausea, or lightheadedness. Don't wait more than five minutes to call [address]! Fast action can save your life. Calling 911 is almost always the fastest way to get lifesaving treatment. Emergency Medical Services staff can begin treatment when they arrive  up to an hour sooner than if someone gets to the hospital by car. The discharge information has been reviewed with the patient and spouse. The patient and spouse verbalized understanding. Discharge medications reviewed with the patient and spouse and appropriate educational materials and side effects teaching were provided. Discharge Orders None General Information Please provide this summary of care documentation to your next provider. Patient Signature:  ____________________________________________________________ Date:  ____________________________________________________________  
  
Mika Ryan Provider Signature:  ____________________________________________________________ Date:  ____________________________________________________________

## 2017-07-04 NOTE — ANESTHESIA POSTPROCEDURE EVALUATION
Post-Anesthesia Evaluation and Assessment    Patient: Ken Rubi MRN: [id number]  SSN: [id number]    YOB: 1950  Age: 78 y.o. Sex: female       Cardiovascular Function/Vital Signs  Visit Vitals    /50 (BP 1 Location: Right arm, BP Patient Position: At rest)    Pulse (!) 56    Temp 36.6 °C (97.8 °F)    Resp 13    Ht 5' 3\" (1.6 m)    Wt 82.1 kg (181 lb)    SpO2 95%    Breastfeeding No    BMI 32.06 kg/m2       Patient is status post MAC anesthesia for Procedure(s):  COLONOSCOPY with biopsies  ENDOSCOPY with biopsies and dilitation . Nausea/Vomiting: None    Postoperative hydration reviewed and adequate. Pain:  Pain Scale 1: Numeric (0 - 10) (07/03/17 1315)  Pain Intensity 1: 0 (07/03/17 1315)   Managed    Neurological Status: At baseline    Mental Status and Level of Consciousness: Arousable    Pulmonary Status:   O2 Device: Room air (07/03/17 1315)   Adequate oxygenation and airway patent    Complications related to anesthesia: None    Post-anesthesia assessment completed.  No concerns    Signed By: Roxana Gavin MD     July 3, 2017

## 2017-07-05 ENCOUNTER — HOSPITAL ENCOUNTER (OUTPATIENT)
Age: 67
Setting detail: OUTPATIENT SURGERY
Discharge: HOME | End: 2017-07-05
Attending: PHYSICAL MEDICINE & REHABILITATION | Admitting: PHYSICAL MEDICINE & REHABILITATION
Payer: MEDICARE

## 2017-07-05 ENCOUNTER — APPOINTMENT (OUTPATIENT)
Dept: GENERAL RADIOLOGY | Age: 67
End: 2017-07-05
Attending: PHYSICAL MEDICINE & REHABILITATION
Payer: MEDICARE

## 2017-07-05 VITALS
TEMPERATURE: 97.9 F | OXYGEN SATURATION: 96 % | BODY MASS INDEX: 32.07 KG/M2 | SYSTOLIC BLOOD PRESSURE: 149 MMHG | HEART RATE: 52 BPM | RESPIRATION RATE: 18 BRPM | WEIGHT: 181 LBS | DIASTOLIC BLOOD PRESSURE: 79 MMHG | HEIGHT: 63 IN

## 2017-07-05 PROCEDURE — 76010000009 HC PAIN MGT 0 TO 30 MIN PROC: Performed by: PHYSICAL MEDICINE & REHABILITATION

## 2017-07-05 PROCEDURE — 74011250637 HC RX REV CODE- 250/637: Performed by: PHYSICAL MEDICINE & REHABILITATION

## 2017-07-05 PROCEDURE — 74011250636 HC RX REV CODE- 250/636

## 2017-07-05 PROCEDURE — 74011000250 HC RX REV CODE- 250

## 2017-07-05 PROCEDURE — 77030003672 HC NDL SPN HALY -A: Performed by: PHYSICAL MEDICINE & REHABILITATION

## 2017-07-05 RX ORDER — DIAZEPAM 5 MG/1
10 TABLET ORAL ONCE
Status: COMPLETED | OUTPATIENT
Start: 2017-07-05 | End: 2017-07-05

## 2017-07-05 RX ORDER — ROPIVACAINE HYDROCHLORIDE 2 MG/ML
INJECTION, SOLUTION EPIDURAL; INFILTRATION; PERINEURAL AS NEEDED
Status: DISCONTINUED | OUTPATIENT
Start: 2017-07-05 | End: 2017-07-05 | Stop reason: HOSPADM

## 2017-07-05 RX ADMIN — DIAZEPAM 10 MG: 5 TABLET ORAL at 09:19

## 2017-07-05 NOTE — H&P (VIEW-ONLY)
[email or web address]  [address] [place], [address]      Brief Procedure Note    Martell Burnham  1950  [id number]    Date of Procedure: 7/3/2017    Preoperative diagnosis: Abnormal swallowing [R13.10]  Gastroesophageal reflux disease, esophagitis presence not specified [K21.9]  Abdominal pain, vomiting, and diarrhea [R10.9, R11.10, R19.7]  Encounter for colonoscopy due to history of adenomatous colonic polyps [Z12.11, Z86.010]  Obstructive chronic bronchitis without exacerbation ([facility] [address] [J44.9]  Abdominal aortic aneurysm (AAA) without rupture ([facility] [address] [I71.4]    Postoperative diagnosis: hiatal hernia. Esophageal ring. hemorrhoids    Type of Anesthesia: MAC (Monitored anesthesia care)    Description of findings: same as post op dx    Procedure: Procedure(s):  COLONOSCOPY with biopsies  ENDOSCOPY with biopsies and dilitation     :  Dr. Sherren Marsh, MD    Assistant(s): Endoscopy Technician-1: Gianna RAWLS  Endoscopy Technician-2: Laura Baeza  Endoscopy RN-1: Ashley Hammer RN    EBL:None    Specimens:   ID Type Source Tests Collected by Time Destination   1 : duodenum bx Preservative Duodenum  Sherren Marsh, MD 7/3/2017 1211 Pathology   2 : random colon bx Preservative Colon  Sherren Marsh, MD 7/3/2017 1221 Pathology       Findings: See printed and scanned procedure note    Complications: None    Dr. Sherren Marsh, MD  7/3/2017  12:32 PM

## 2017-07-05 NOTE — DISCHARGE INSTRUCTIONS
[facility]      Post Procedures Instructions    *Resume Diet and Activity as tolerated. Rest for the remainder of the day. *You may fell worse before you feel better as the numbing medications wear off before the steroids take effect if used for your procedures. *Do not use affected extremity until numbness or loss of sensation has completely resolved without assistance. *DO NOT DRIVE, operate machinery/heavey equipment for 24 hours. *DO NOT DRINK ALCOHOL for 24 hours as it may interact with the sedation if you received it and also thins your blood and may cause you to bleed. *WAIT 24 hours before starting back ANY Blood thinning medications:   (Heparin, Coumadin, Warfarin, Lovenox, Plavix, Aggrenox)    *Resume Pre-Procedure Medications as prescribed except Blood Thinners unless directed by your Physician or Cardiologist.     *Avoid Hot tubs and Heating pad for 24 hours to prevent dissipation of medications, you may shower to remove bandages and remaining prep residue on the skin. * If you develop a Headache, drink plenty of fluids including beverages with caffeine (Coffee, Mt. Dew etc.) and rest.  If the headache persists longer than 24 hoursor intensifies - Please call [facility] ([facility]) [phone]      * If you are DIABETIC, check your blood sugar three times a day for the next three days, the steroids will increase your blood sugar. If your blood sugar is greater than 400 have someone drive you to the nearest [address]. * If you experience any of the following problems, call the [facility] [phone] between 8:00 am - 4:30pm or After Hours [phone].     Shortness of breath    Fever of 101 F or higher    Nausea / Vomiting (not normal to you)    Increasing stiffness in the neck    Weakness or numbness in the arms or legs that is not resolving    Prolonged and increasing pain > than 4 days    ANYTHING OUT of the ORDINARY TO YOU    If YOU are experiencing a severe reaction / complication that you have never had before post procedure, call 911 or go to the nearest emergency room! All patients must have a  for transportation [place] regardless if you do or do not receive sedation. DISCHARGE SUMMARY from Nurse      PATIENT INSTRUCTIONS:    After Oral  or intravenous sedation, for 24 hours or while taking prescription Narcotics:  · Limit your activities  · Do not drive and operate hazardous machinery  · Do not make important personal or business decisions  · Do  not drink alcoholic beverages  · If you have not urinated within 8 hours after discharge, please contact your surgeon on call. Report the following to your surgeon:  · Excessive pain, swelling, redness or odor of or around the surgical area  · Temperature over 101  · Nausea and vomiting lasting longer than 4 hours or if unable to take medications  · Any signs of decreased circulation or nerve impairment to extremity: change in color, persistent  numbness, tingling, coldness or increase pain  · Any questions        What to do at Home:  Recommended activity: Activity as tolerated, NO DRIVING FOR 24 Hours post injection          *  Please give a list of your current medications to your Primary Care Provider. *  Please update this list whenever your medications are discontinued, doses are      changed, or new medications (including over-the-counter products) are added. *  Please carry medication information at all times in case of emergency situations. These are general instructions for a healthy lifestyle:    No smoking/ No tobacco products/ Avoid exposure to second hand smoke    Surgeon General's Warning:  Quitting smoking now greatly reduces serious risk to your health.     Obesity, smoking, and sedentary lifestyle greatly increases your risk for illness    A healthy diet, regular physical exercise & weight monitoring are important for maintaining a healthy lifestyle    You may be retaining fluid if you have a history of heart failure or if you experience any of the following symptoms:  Weight gain of 3 pounds or more overnight or 5 pounds in a week, increased swelling in our hands or feet or shortness of breath while lying flat in bed. Please call your doctor as soon as you notice any of these symptoms; do not wait until your next office visit. Recognize signs and symptoms of STROKE:    F-face looks uneven    A-arms unable to move or move unevenly    S-speech slurred or non-existent    T-time-call 911 as soon as signs and symptoms begin-DO NOT go       Back to bed or wait to see if you get better-TIME IS BRAIN. [email or web address] Activation    Thank you for requesting access to [email or web address]. Please follow the instructions below to securely access and download your online medical record. [email or web address] allows you to send messages to your doctor, view your test results, renew your prescriptions, schedule appointments, and more. How Do I Sign Up? 1. In your internet browser, go to [email or web address]  2. Click on the First Time User? Click Here link in the Sign In box. You will be redirect to the New Member Sign Up page. 3. Enter your [email or web address] Access Code exactly as it appears below. You will not need to use this code after youve completed the sign-up process. If you do not sign up before the expiration date, you must request a new code. [email or web address] Access Code: Activation code not generated  Current [email or web address] Status: Patient Declined (This is the date your [email or web address] access code will )    4. Enter the last four digits of your Social Security Number (xxxx) and Date of Birth (mm/dd/yyyy) as indicated and click Submit. You will be taken to the next sign-up page. 5. Create a [email or web address] ID. This will be your [email or web address] login ID and cannot be changed, so think of one that is secure and easy to remember.   6. Create a [employer]Demand password. You can change your password at any time. 7. Enter your Password Reset Question and Answer. This can be used at a later time if you forget your password. 8. Enter your e-mail address. You will receive e-mail notification when new information is available in [address] 9. Click Sign Up. You can now view and download portions of your medical record. 10. Click the Download Summary menu link to download a portable copy of your medical information. Additional Information    If you have questions, please visit the Frequently Asked Questions section of the [employer] website at [email or web address] Remember, [employer] is NOT to be used for urgent needs. For medical emergencies, dial 911.

## 2017-07-05 NOTE — INTERVAL H&P NOTE
H&P Update:  Nicole Colindres was seen and examined. History and physical has been reviewed. The patient has been examined.  There have been no significant clinical changes since the completion of the originally dated History and Physical.    Signed By: Yamilka Mora MD     July 5, 2017 9:38 AM

## 2017-07-06 NOTE — PROCEDURES
[facility]    DIAG LUMBAR FACET INJECTIONS  PROCEDURE REPORT      PATIENT:  Fab Acharya  YOB: 1950  DATE OF SERVICE: 7/5/2017  [facility]    PRE-PROCEDURE DIAGNOSIS:  See Above    POST-PROCEDURE DIAGNOSIS:  See Above                PROCEDURE:  1. Bilateral diagnostic lumbar medial branch blocks via the  L3/L4,  L4/L5,  L5/S1 medial branch nerves ( 65204, 50;  93171, 50;  11771, 50 )  2. Fluoroscopic needle guidance (20215)      LEVELS TREATED:  Bilateral sided  L3/L4,  L4/L5,  L5/S1 medial branch nerves     ANESTHESIA:  See Medication Administration Record    COMPLICATIONS: None. PHYSICIAN:  Vianey Moore MD    PRE-PROCEDURE NOTE:  Pre-procedural assessment of the patient was performed including a limited history and physical examination. The details of the procedure were discussed with the patient, including the risks, benefits and alternative options and an informed consent was obtained. The patients NPO status, if necessary for the specific procedure and/or administration of moderate intravenous sedation, if utilized, and availability of a responsible adult to escort the patient following the procedure were confirmed. PROCEDURE NOTE:  The patient was brought to the procedure suite and positioned on the fluoroscopy table in the prone position. Physiologic monitors were applied and supplemental oxygen was administered via nasal cannula. The skin was prepped in the standard surgical fashion and sterile drapes were applied over the procedure site. Please refer to the Flowsheet for documentation of the patients vital signs and the Medication Administration Record for any oral and/or intravenous sedation administered prior to or during the procedure. 1% Lidocaine was utilized for local anesthesia.  Under AP fluoroscopic guidance a 25-gauge, 3-1/2 inch short bevel spinal needle was advanced to the junction of the superior articular process and transverse process of each vertebral level immediately inferior to the above-mentioned dorsal rami medial branch nerves. A needle was also placed along the sacral ala to block the L5 medial branch nerve. After all needles were placed,  0.5 mL of 0.2% ropivacaine was injected at each location after the negative aspiration of blood, air or CSF. The needles were removed and the stilets were replaced. The procedure was performed on the contralateral side in the same fashion and at the same levels using the same volume of local anesthetic following negative aspiration of blood, air or CSF. The needles were removed intact. The area was thoroughly cleaned and sterile bandages applied as necessary. The patient tolerated the procedure well and vital signs remained stable throughout the procedure. The patient was assessed immediately following the procedure and was noted to have greater than 50% reduction in pain (a reduction from 7/10 to 2/10 in severity of lumbar pain). Based on these results, this procedure will be repeated to assess if the patient is an appropriate candidate for radiofrequency ablation of the above-mentioned medial branch nerves. Based on these results, the patient is considered an appropriate candidate for radiofrequency ablation of the above-mentioned medial branch nerves and will be scheduled for that procedure. The total levels successfully blocked were 3 levels, both sides. POST-PROCEDURE COURSE:  The patient was escorted from the procedure suite in satisfactory condition and recovered per facility protocol based on the type of procedure performed and/or the sedation utilized. The patient did not experience any adverse events and remained hemodynamically stable during the post-procedure period.       DISCHARGE NOTE:  Upon discharge, the patient was able to tolerate fluids and was in no acute distress. The patient was oriented to person, place and time and vital signs were stable. Appropriate post-procedure instructions were provided and explained to the patient in detail and all questions were answered.     Sebastián Robbins MD 7/6/2017 5:39 PM

## 2017-07-11 VITALS
BODY MASS INDEX: 32.07 KG/M2 | WEIGHT: 181 LBS | TEMPERATURE: 97.8 F | OXYGEN SATURATION: 98 % | HEART RATE: 89 BPM | DIASTOLIC BLOOD PRESSURE: 67 MMHG | RESPIRATION RATE: 20 BRPM | HEIGHT: 63 IN | SYSTOLIC BLOOD PRESSURE: 130 MMHG

## 2017-07-11 NOTE — ADDENDUM NOTE
Addendum  created 07/11/17 1128 by Therese Hines MD    Visit Navigator SmartForm Flowsheet section accepted

## 2017-07-12 ENCOUNTER — TELEPHONE (OUTPATIENT)
Dept: PAIN MANAGEMENT | Age: 67
End: 2017-07-12

## 2017-07-12 NOTE — TELEPHONE ENCOUNTER
Ms. Jazmin Danielle was contacted for follow-up status post Bilateral diagnostic lumbar medial branch blocks via the  L3/L4,  L4/L5,  L5/S1 medial branch nerves  on July 5, 2017.  She reports:    Pre-procedure numerical pain score: 6/10  Post-procedure numerical pain score immediately after: 1/10  Duration of relief post-procedure (if applicable): 6 hours   Improvement in functional activities (if applicable): Yes  Percentage of overall improvement: 95%    COMMENTS:

## 2017-07-14 RX ORDER — SODIUM CHLORIDE 0.9 % (FLUSH) 0.9 %
5-10 SYRINGE (ML) INJECTION AS NEEDED
Status: CANCELLED | OUTPATIENT
Start: 2017-07-14

## 2017-07-14 RX ORDER — DIAZEPAM 5 MG/1
5-20 TABLET ORAL ONCE
Status: CANCELLED | OUTPATIENT
Start: 2017-07-19 | End: 2017-07-19

## 2017-07-19 ENCOUNTER — HOSPITAL ENCOUNTER (OUTPATIENT)
Age: 67
Setting detail: OUTPATIENT SURGERY
Discharge: HOME | End: 2017-07-19
Attending: PHYSICAL MEDICINE & REHABILITATION | Admitting: PHYSICAL MEDICINE & REHABILITATION
Payer: MEDICARE

## 2017-07-19 ENCOUNTER — APPOINTMENT (OUTPATIENT)
Dept: GENERAL RADIOLOGY | Age: 67
End: 2017-07-19
Attending: PHYSICAL MEDICINE & REHABILITATION
Payer: MEDICARE

## 2017-07-19 VITALS
HEIGHT: 63 IN | TEMPERATURE: 97.9 F | WEIGHT: 183 LBS | BODY MASS INDEX: 32.43 KG/M2 | DIASTOLIC BLOOD PRESSURE: 79 MMHG | HEART RATE: 56 BPM | RESPIRATION RATE: 16 BRPM | SYSTOLIC BLOOD PRESSURE: 142 MMHG | OXYGEN SATURATION: 97 %

## 2017-07-19 PROCEDURE — 74011000250 HC RX REV CODE- 250

## 2017-07-19 PROCEDURE — 76010000009 HC PAIN MGT 0 TO 30 MIN PROC: Performed by: PHYSICAL MEDICINE & REHABILITATION

## 2017-07-19 PROCEDURE — 77030003672 HC NDL SPN HALY -A: Performed by: PHYSICAL MEDICINE & REHABILITATION

## 2017-07-19 PROCEDURE — 74011250636 HC RX REV CODE- 250/636

## 2017-07-19 PROCEDURE — 74011250637 HC RX REV CODE- 250/637: Performed by: PHYSICAL MEDICINE & REHABILITATION

## 2017-07-19 RX ORDER — SODIUM CHLORIDE 0.9 % (FLUSH) 0.9 %
5-10 SYRINGE (ML) INJECTION AS NEEDED
Status: DISCONTINUED | OUTPATIENT
Start: 2017-07-19 | End: 2017-07-19 | Stop reason: HOSPADM

## 2017-07-19 RX ORDER — DIAZEPAM 5 MG/1
5-20 TABLET ORAL ONCE
Status: COMPLETED | OUTPATIENT
Start: 2017-07-19 | End: 2017-07-19

## 2017-07-19 RX ORDER — ROPIVACAINE HYDROCHLORIDE 2 MG/ML
INJECTION, SOLUTION EPIDURAL; INFILTRATION; PERINEURAL AS NEEDED
Status: DISCONTINUED | OUTPATIENT
Start: 2017-07-19 | End: 2017-07-19 | Stop reason: HOSPADM

## 2017-07-19 RX ORDER — LIDOCAINE HYDROCHLORIDE 10 MG/ML
INJECTION, SOLUTION EPIDURAL; INFILTRATION; INTRACAUDAL; PERINEURAL AS NEEDED
Status: DISCONTINUED | OUTPATIENT
Start: 2017-07-19 | End: 2017-07-19 | Stop reason: HOSPADM

## 2017-07-19 RX ADMIN — DIAZEPAM 10 MG: 5 TABLET ORAL at 09:15

## 2017-07-19 NOTE — INTERVAL H&P NOTE
H&P Update:  Virgil Puente was seen and examined. History and physical has been reviewed. The patient has been examined.  There have been no significant clinical changes since the completion of the originally dated History and Physical.    Signed By: Sebastián Robbins MD     July 19, 2017 9:20 AM

## 2017-07-19 NOTE — DISCHARGE INSTRUCTIONS
[address] [place] [address] for Pain Management      Post Procedures Instructions    *Resume Diet and Activity as tolerated. Rest for the remainder of the day. *You may fell worse before you feel better as the numbing medications wear off before the steroids take effect if used for your procedures. *Do not use affected extremity until numbness or loss of sensation has completely resolved without assistance. *DO NOT DRIVE, operate machinery/heavey equipment for 24 hours. *DO NOT DRINK ALCOHOL for 24 hours as it may interact with the sedation if you received it and also thins your blood and may cause you to bleed. *WAIT 24 hours before starting back ANY Blood thinning medications:   (Heparin, Coumadin, Warfarin, Lovenox, Plavix, Aggrenox)    *Resume Pre-Procedure Medications as prescribed except Blood Thinners unless directed by your Physician or Cardiologist.     *Avoid Hot tubs and Heating pad for 24 hours to prevent dissipation of medications, you may shower to remove bandages and remaining prep residue on the skin. * If you develop a Headache, drink plenty of fluids including beverages with caffeine (Coffee, Mt. Dew etc.) and rest.  If the headache persists longer than 24 hoursor intensifies - Please call [facility] [phone]    * If you are DIABETIC, check your blood sugar three times a day for the next three days, the steroids will increase your blood sugar. If your blood sugar is greater than 400 have someone drive you to the nearest [address]. * If you experience any of the following problems, call the [facility] [phone] between 8:00 am - 4:30pm or After Hours [phone].     Shortness of breath    Fever of 101 F or higher    Nausea / Vomiting (not normal to you)    Increasing stiffness in the neck    Weakness or numbness in the arms or legs that is not resolving    Prolonged and increasing pain > than 4 days    ANYTHING OUT of the ORDINARY TO YOU    If YOU are experiencing a severe reaction / complication that you have never had before post procedure, call 911 or go to the nearest emergency room! All patients must have a  for transportation [place] regardless if you do or do not receive sedation. DISCHARGE SUMMARY from Nurse      PATIENT INSTRUCTIONS:    After Oral  or intravenous sedation, for 24 hours or while taking prescription Narcotics:  · Limit your activities  · Do not drive and operate hazardous machinery  · Do not make important personal or business decisions  · Do  not drink alcoholic beverages  · If you have not urinated within 8 hours after discharge, please contact your surgeon on call. Report the following to your surgeon:  · Excessive pain, swelling, redness or odor of or around the surgical area  · Temperature over 101  · Nausea and vomiting lasting longer than 4 hours or if unable to take medications  · Any signs of decreased circulation or nerve impairment to extremity: change in color, persistent  numbness, tingling, coldness or increase pain  · Any questions        What to do at Home:  Recommended activity: Activity as tolerated, NO DRIVING FOR 24 Hours post injection          *  Please give a list of your current medications to your Primary Care Provider. *  Please update this list whenever your medications are discontinued, doses are      changed, or new medications (including over-the-counter products) are added. *  Please carry medication information at all times in case of emergency situations. These are general instructions for a healthy lifestyle:    No smoking/ No tobacco products/ Avoid exposure to second hand smoke    Surgeon General's Warning:  Quitting smoking now greatly reduces serious risk to your health.     Obesity, smoking, and sedentary lifestyle greatly increases your risk for illness    A healthy diet, regular physical exercise & weight monitoring are important for maintaining a healthy lifestyle    You may be retaining fluid if you have a history of heart failure or if you experience any of the following symptoms:  Weight gain of 3 pounds or more overnight or 5 pounds in a week, increased swelling in our hands or feet or shortness of breath while lying flat in bed. Please call your doctor as soon as you notice any of these symptoms; do not wait until your next office visit. Recognize signs and symptoms of STROKE:    F-face looks uneven    A-arms unable to move or move unevenly    S-speech slurred or non-existent    T-time-call 911 as soon as signs and symptoms begin-DO NOT go       Back to bed or wait to see if you get better-TIME IS BRAIN.

## 2017-07-19 NOTE — PROCEDURES
[facility]    DIAG LUMBAR FACET INJECTIONS  PROCEDURE REPORT      PATIENT:  Fab Acharya  YOB: 1950  DATE OF SERVICE:  7/19/2017  [facility]    PRE-PROCEDURE DIAGNOSIS:  See Above    POST-PROCEDURE DIAGNOSIS:  See Above                PROCEDURE:  1. Bilateral diagnostic lumbar medial branch blocks via the  L3/L4,  L4/L5,  L5/S1 medial branch nerves ( 27187, 50;  91514, 50;  96467, 50 )  2. Fluoroscopic needle guidance (04019)      LEVELS TREATED:  Bilateral sided  L3/L4,  L4/L5,  L5/S1 medial branch nerves     ANESTHESIA:  See Medication Administration Record    COMPLICATIONS: None. PHYSICIAN:  Vianey Moore MD    PRE-PROCEDURE NOTE:  Pre-procedural assessment of the patient was performed including a limited history and physical examination. The details of the procedure were discussed with the patient, including the risks, benefits and alternative options and an informed consent was obtained. The patients NPO status, if necessary for the specific procedure and/or administration of moderate intravenous sedation, if utilized, and availability of a responsible adult to escort the patient following the procedure were confirmed. PROCEDURE NOTE:  The patient was brought to the procedure suite and positioned on the fluoroscopy table in the prone position. Physiologic monitors were applied and supplemental oxygen was administered via nasal cannula. The skin was prepped in the standard surgical fashion and sterile drapes were applied over the procedure site. Please refer to the Flowsheet for documentation of the patients vital signs and the Medication Administration Record for any oral and/or intravenous sedation administered prior to or during the procedure. 1% Lidocaine was utilized for local anesthesia.  Under AP fluoroscopic guidance a 25-gauge, 3-1/2 inch short bevel spinal needle was advanced to the junction of the superior articular process and transverse process of each vertebral level immediately inferior to the above-mentioned dorsal rami medial branch nerves. A needle was also placed along the sacral ala to block the L5 medial branch nerve. After all needles were placed,  0.5 mL of 0.2% ropivacaine was injected at each location after the negative aspiration of blood, air or CSF. The needles were removed and the stilets were replaced. The procedure was performed on the contralateral side in the same fashion and at the same levels using the same volume of local anesthetic following negative aspiration of blood, air or CSF. The needles were removed intact. The area was thoroughly cleaned and sterile bandages applied as necessary. The patient tolerated the procedure well and vital signs remained stable throughout the procedure. The patient was assessed immediately following the procedure and was noted to have greater than 50% reduction in pain (a reduction from 7/10 to 2/10 in severity of lumbar pain). Based on these results, this procedure will be repeated to assess if the patient is an appropriate candidate for radiofrequency ablation of the above-mentioned medial branch nerves. Based on these results, the patient is considered an appropriate candidate for radiofrequency ablation of the above-mentioned medial branch nerves and will be scheduled for that procedure. The total levels successfully blocked were 3 levels, both sides. POST-PROCEDURE COURSE:  The patient was escorted from the procedure suite in satisfactory condition and recovered per facility protocol based on the type of procedure performed and/or the sedation utilized. The patient did not experience any adverse events and remained hemodynamically stable during the post-procedure period.       DISCHARGE NOTE:  Upon discharge, the patient was able to tolerate fluids and was in no acute distress. The patient was oriented to person, place and time and vital signs were stable. Appropriate post-procedure instructions were provided and explained to the patient in detail and all questions were answered.     Sebastián Robbins MD 7/19/2017 11:23 AM

## 2017-07-19 NOTE — H&P (VIEW-ONLY)
[email or web address]  [place], [address]      Brief Procedure Note    Virgil Puente  1950  [id number]    Date of Procedure: 7/3/2017    Preoperative diagnosis: Abnormal swallowing [R13.10]  Gastroesophageal reflux disease, esophagitis presence not specified [K21.9]  Abdominal pain, vomiting, and diarrhea [R10.9, R11.10, R19.7]  Encounter for colonoscopy due to history of adenomatous colonic polyps [Z12.11, Z86.010]  Obstructive chronic bronchitis without exacerbation ([facility] [J44.9]  Abdominal aortic aneurysm (AAA) without rupture ([facility] [I71.4]    Postoperative diagnosis: hiatal hernia. Esophageal ring. hemorrhoids    Type of Anesthesia: MAC (Monitored anesthesia care)    Description of findings: same as post op dx    Procedure: Procedure(s):  COLONOSCOPY with biopsies  ENDOSCOPY with biopsies and dilitation     :  Dr. Cinthya Montes MD    Assistant(s): Endoscopy Technician-1: Mert Emmanuel CHBYLUQ  Endoscopy Technician-2: Cuba Barlow  Endoscopy RN-1: Adelfo Arreola; Brandon Luna RN    EBL:None    Specimens:   ID Type Source Tests Collected by Time Destination   1 : duodenum bx Preservative Duodenum  Cinthya Montes MD 7/3/2017 1211 Pathology   2 : random colon bx Preservative Colon  Cinthya Montes MD 7/3/2017 1221 Pathology       Findings: See printed and scanned procedure note    Complications: None    Dr. Cinthya Montes MD  7/3/2017  12:32 PM

## 2017-07-25 ENCOUNTER — TELEPHONE (OUTPATIENT)
Dept: PAIN MANAGEMENT | Age: 67
End: 2017-07-25

## 2017-07-25 NOTE — TELEPHONE ENCOUNTER
Ms. Destin Abdul was contacted for follow-up status post   Bilateral diagnostic lumbar medial branch blocks via the  L3/L4,  L4/L5,  L5/S1 medial branch nerves on July 19, 2017.  She reports:    Pre-procedure numerical pain score: 7/10  Post-procedure numerical pain score immediately after: 3/10  Duration of relief post-procedure (if applicable): 6 hours  Improvement in functional activities (if applicable): Yes  Percentage of overall improvement: 80%    COMMENTS:

## 2017-08-08 RX ORDER — MIDAZOLAM HYDROCHLORIDE 1 MG/ML
.5-6 INJECTION, SOLUTION INTRAMUSCULAR; INTRAVENOUS
Status: CANCELLED | OUTPATIENT
Start: 2017-08-21

## 2017-08-08 RX ORDER — SODIUM CHLORIDE 0.9 % (FLUSH) 0.9 %
5-10 SYRINGE (ML) INJECTION AS NEEDED
Status: CANCELLED | OUTPATIENT
Start: 2017-08-21

## 2017-08-21 ENCOUNTER — APPOINTMENT (OUTPATIENT)
Dept: GENERAL RADIOLOGY | Age: 67
End: 2017-08-21
Attending: PHYSICAL MEDICINE & REHABILITATION
Payer: MEDICARE

## 2017-08-21 ENCOUNTER — HOSPITAL ENCOUNTER (OUTPATIENT)
Age: 67
Setting detail: OUTPATIENT SURGERY
Discharge: HOME | End: 2017-08-21
Attending: PHYSICAL MEDICINE & REHABILITATION | Admitting: PHYSICAL MEDICINE & REHABILITATION
Payer: MEDICARE

## 2017-08-21 VITALS
RESPIRATION RATE: 18 BRPM | TEMPERATURE: 97.9 F | DIASTOLIC BLOOD PRESSURE: 72 MMHG | OXYGEN SATURATION: 94 % | SYSTOLIC BLOOD PRESSURE: 124 MMHG | HEART RATE: 52 BPM

## 2017-08-21 PROCEDURE — 76010000009 HC PAIN MGT 0 TO 30 MIN PROC: Performed by: PHYSICAL MEDICINE & REHABILITATION

## 2017-08-21 PROCEDURE — 99152 MOD SED SAME PHYS/QHP 5/>YRS: CPT | Performed by: PHYSICAL MEDICINE & REHABILITATION

## 2017-08-21 PROCEDURE — 77030020508 HC PD GRND GENRTR BAYL -A: Performed by: PHYSICAL MEDICINE & REHABILITATION

## 2017-08-21 PROCEDURE — 74011250636 HC RX REV CODE- 250/636

## 2017-08-21 PROCEDURE — 77030029505: Performed by: PHYSICAL MEDICINE & REHABILITATION

## 2017-08-21 PROCEDURE — 74011000250 HC RX REV CODE- 250

## 2017-08-21 RX ORDER — SODIUM CHLORIDE 0.9 % (FLUSH) 0.9 %
5-10 SYRINGE (ML) INJECTION AS NEEDED
Status: DISCONTINUED | OUTPATIENT
Start: 2017-08-21 | End: 2017-08-21 | Stop reason: HOSPADM

## 2017-08-21 RX ORDER — MIDAZOLAM HYDROCHLORIDE 1 MG/ML
INJECTION, SOLUTION INTRAMUSCULAR; INTRAVENOUS AS NEEDED
Status: DISCONTINUED | OUTPATIENT
Start: 2017-08-21 | End: 2017-08-21 | Stop reason: HOSPADM

## 2017-08-21 RX ORDER — MIDAZOLAM HYDROCHLORIDE 1 MG/ML
.5-6 INJECTION, SOLUTION INTRAMUSCULAR; INTRAVENOUS
Status: DISCONTINUED | OUTPATIENT
Start: 2017-08-21 | End: 2017-08-21 | Stop reason: HOSPADM

## 2017-08-21 RX ORDER — LIDOCAINE HYDROCHLORIDE 20 MG/ML
INJECTION, SOLUTION EPIDURAL; INFILTRATION; INTRACAUDAL; PERINEURAL AS NEEDED
Status: DISCONTINUED | OUTPATIENT
Start: 2017-08-21 | End: 2017-08-21 | Stop reason: HOSPADM

## 2017-08-21 RX ORDER — DEXAMETHASONE SODIUM PHOSPHATE 100 MG/10ML
INJECTION INTRAMUSCULAR; INTRAVENOUS AS NEEDED
Status: DISCONTINUED | OUTPATIENT
Start: 2017-08-21 | End: 2017-08-21 | Stop reason: HOSPADM

## 2017-08-21 RX ORDER — LIDOCAINE HYDROCHLORIDE 10 MG/ML
INJECTION, SOLUTION EPIDURAL; INFILTRATION; INTRACAUDAL; PERINEURAL AS NEEDED
Status: DISCONTINUED | OUTPATIENT
Start: 2017-08-21 | End: 2017-08-21 | Stop reason: HOSPADM

## 2017-08-21 RX ORDER — FENTANYL CITRATE 50 UG/ML
INJECTION, SOLUTION INTRAMUSCULAR; INTRAVENOUS AS NEEDED
Status: DISCONTINUED | OUTPATIENT
Start: 2017-08-21 | End: 2017-08-21 | Stop reason: HOSPADM

## 2017-08-21 NOTE — DISCHARGE INSTRUCTIONS
[address] [place] [address] for Pain Management      Post Procedures Instructions    *Resume Diet and Activity as tolerated. Rest for the remainder of the day. *You may fell worse before you feel better as the numbing medications wear off before the steroids take effect if used for your procedures. *Do not use affected extremity until numbness or loss of sensation has completely resolved without assistance. *DO NOT DRIVE, operate machinery/heavey equipment for 24 hours. *DO NOT DRINK ALCOHOL for 24 hours as it may interact with the sedation if you received it and also thins your blood and may cause you to bleed. *WAIT 24 hours before starting back ANY Blood thinning medications:   (Heparin, Coumadin, Warfarin, Lovenox, Plavix, Aggrenox)    *Resume Pre-Procedure Medications as prescribed except Blood Thinners unless directed by your Physician or Cardiologist.     *Avoid Hot tubs and Heating pad for 24 hours to prevent dissipation of medications, you may shower to remove bandages and remaining prep residue on the skin. * If you develop a Headache, drink plenty of fluids including beverages with caffeine (Coffee, Mt. Dew etc.) and rest.  If the headache persists longer than 24 hoursor intensifies - Please call [facility] [phone]    * If you are DIABETIC, check your blood sugar three times a day for the next three days, the steroids will increase your blood sugar. If your blood sugar is greater than 400 have someone drive you to the nearest [address]. * If you experience any of the following problems, call the [facility] [phone] between 8:00 am - 4:30pm or After Hours [phone].     Shortness of breath    Fever of 101 F or higher    Nausea / Vomiting (not normal to you)    Increasing stiffness in the neck    Weakness or numbness in the arms or legs that is not resolving    Prolonged and increasing pain > than 4 days    ANYTHING OUT of the ORDINARY TO YOU    If YOU are experiencing a severe reaction / complication that you have never had before post procedure, call 911 or go to the nearest emergency room! All patients must have a  for transportation [place] regardless if you do or do not receive sedation. DISCHARGE SUMMARY from Nurse      PATIENT INSTRUCTIONS:    After Oral  or intravenous sedation, for 24 hours or while taking prescription Narcotics:  · Limit your activities  · Do not drive and operate hazardous machinery  · Do not make important personal or business decisions  · Do  not drink alcoholic beverages  · If you have not urinated within 8 hours after discharge, please contact your surgeon on call. Report the following to your surgeon:  · Excessive pain, swelling, redness or odor of or around the surgical area  · Temperature over 101  · Nausea and vomiting lasting longer than 4 hours or if unable to take medications  · Any signs of decreased circulation or nerve impairment to extremity: change in color, persistent  numbness, tingling, coldness or increase pain  · Any questions        What to do at Home:  Recommended activity: Activity as tolerated, NO DRIVING FOR 24 Hours post injection          *  Please give a list of your current medications to your Primary Care Provider. *  Please update this list whenever your medications are discontinued, doses are      changed, or new medications (including over-the-counter products) are added. *  Please carry medication information at all times in case of emergency situations. These are general instructions for a healthy lifestyle:    No smoking/ No tobacco products/ Avoid exposure to second hand smoke    Surgeon General's Warning:  Quitting smoking now greatly reduces serious risk to your health.     Obesity, smoking, and sedentary lifestyle greatly increases your risk for illness    A healthy diet, regular physical exercise & weight monitoring are important for maintaining a healthy lifestyle    You may be retaining fluid if you have a history of heart failure or if you experience any of the following symptoms:  Weight gain of 3 pounds or more overnight or 5 pounds in a week, increased swelling in our hands or feet or shortness of breath while lying flat in bed. Please call your doctor as soon as you notice any of these symptoms; do not wait until your next office visit. Recognize signs and symptoms of STROKE:    F-face looks uneven    A-arms unable to move or move unevenly    S-speech slurred or non-existent    T-time-call 911 as soon as signs and symptoms begin-DO NOT go       Back to bed or wait to see if you get better-TIME IS BRAIN.

## 2017-08-21 NOTE — PROCEDURES
[facility]    THERMOCOAGULATION OF LUMBAR MEDIAL BRANCH  PROCEDURE REPORT      PATIENT:  Lissette Markham  YOB: 1950  DATE OF SERVICE:  8/21/2017  [facility]    PRE-PROCEDURE DIAGNOSIS:  See Above    POST-PROCEDURE DIAGNOSIS:  See Above    PROCEDURE:      1. Left radiofrequency thermocoagulation of lumbar medial branch nerves,  L3/L4, L4/L5, L5/S1 (96549, 64636 x2)  2. Fluoroscopic needle guidance (spinal) (48010)  3. Supervision of moderate sedation (48373)  4. Additional 15 minutes of supervised moderate sedation (24906)    ANESTHESIA:  Local with moderate IV sedation. See Medication Administration Record for specific medications and dosage. COMPLICATIONS: None. PHYSICIAN:  Lenard Mcclain MD    PRE-PROCEDURE NOTE:  Pre-procedural assessment of the patient was performed including a limited history and physical examination. The details of the procedure were discussed with the patient, including the risks, benefits and alternative options and an informed consent was obtained. The patients NPO status, if necessary for the specific procedure and/or administration of moderate intravenous sedation, if utilized, and availability of a responsible adult to escort the patient following the procedure were confirmed. A peripheral intravenous cannula was placed without difficulty and lactated Ringers solution administered. See nursing notes for details. PROCEDURE NOTE:  The patient was brought to the procedure suite and positioned on the fluoroscopy table in the prone position. Physiologic monitors were applied and supplemental oxygen was administered via nasal cannula. The skin was prepped in the standard surgical fashion and sterile drapes were applied over the procedure site.  Please refer to the Flowsheet for documentation of the patients vital signs and the Medication Administration Record for any oral and/or intravenous sedation administered prior to or during the procedure. 1% Lidocaine was utilized for local anesthesia. Under 10-15 degree ipsilateral oblique fluoroscopic guidance a 15cm 18gauge radiofrequency needle with a 10 mm curved active tip was advanced to the junction of the superior articular process and transverse process of each vertebral level immediately inferior to the above-mentioned dorsal rami medial branch nerves. Under AP fluoroscopic guidance, a similar needle was then placed over the superior margin of the sacral ala to thermocoagulate the L5 medial branch nerve. After each individual needle was placed, sensory and motor testing, at 50 Hz and 2 Hz, respectively, were performed which elicited ipsilateral deep local back discomfort without evidence of motor stimulation in the ipsilateral gluteal muscles or extremity. Following this, 1 mL of lidocaine 2% was injected after the negative aspiration of blood, air or CSF. Final correct needle placement was then confirmed by viewing each needle in AP and lateral fluoroscopic views in addition to repeat sensory and motor testing which, again, elicited ipsilateral deep local back discomfort without evidence of motor stimulation in the ipsilateral gluteal muscles or extremity. Medial branch nerve radiofrequency thermocoagulation was then performed at each level for 120 seconds at 80° centigrade x 1 cycle. Following this, 1/2ml to 1ml of a mixture of lidocaine 1% admixed with dexamethasone 5mg [10mg/ml] was injected through each radiofrequency needle after negative aspiration and before removing each needle. Then, all needles were removed intact. The area was thoroughly cleaned and sterile bandages applied as necessary. The patient tolerated the procedure well without complication and the vital signs remained stable throughout the procedure.     POST-PROCEDURE COURSE:   The patient was escorted from the procedure suite in satisfactory condition and recovered per facility protocol based on the type of procedure performed and/or the sedation utilized. The patient did not experience any adverse events and remained hemodynamically stable during the post-procedure period. DISCHARGE NOTE:  Upon discharge, the patient was able to tolerate fluids and was in no acute distress. The patient was oriented to person, place and time and vital signs were stable. Appropriate post-procedure instructions were provided and explained to the patient in detail and all questions were answered.                     Lenard Mcclain MD 8/21/2017 12:16 PM

## 2017-08-25 ENCOUNTER — TELEPHONE (OUTPATIENT)
Dept: PAIN MANAGEMENT | Age: 67
End: 2017-08-25

## 2017-08-25 DIAGNOSIS — R11.0 NAUSEA: Primary | ICD-10-CM

## 2017-08-25 RX ORDER — ONDANSETRON 4 MG/1
4 TABLET, ORALLY DISINTEGRATING ORAL
Qty: 12 TAB | Refills: 0 | Status: ON HOLD | OUTPATIENT
Start: 2017-08-25 | End: 2017-09-05

## 2017-08-25 NOTE — TELEPHONE ENCOUNTER
25 Aug 2017, 12:15PM.  I just spoke to Ms. Andrés Gandhi. She has been having complaints of primarily nausea, occasional vomiting, and diarrhea, as well as general feeling of malaise since her lumbar radiofrequency neurotomy procedure which we did 4 days ago. This was left sided lumbar radiofrequency neurotomy left side L3-4, L4-5, L5-S1 level with IV conscious sedation. I have explained to her that it is very unusual to have these symptoms that have persisted for days after a lumbar radiofrequency neurotomy procedure. My suspicion is that she actually may have a viral syndrome, perhaps GI syndrome, causing her symptoms. She has requested an antiemetic. I have reviewed her allergies, and I will prescribe her some Zofran tablets. I have asked her that if her symptoms persist that she visit either urgent care, or call her primary care physician. She can also walk in as a walk-in clinic visit next Tuesday when I have afternoon clinic if her symptoms have persisted.  [facility]

## 2017-08-31 RX ORDER — SODIUM CHLORIDE 0.9 % (FLUSH) 0.9 %
5-10 SYRINGE (ML) INJECTION AS NEEDED
Status: CANCELLED | OUTPATIENT
Start: 2017-09-05

## 2017-08-31 RX ORDER — MIDAZOLAM HYDROCHLORIDE 1 MG/ML
.5-6 INJECTION, SOLUTION INTRAMUSCULAR; INTRAVENOUS
Status: CANCELLED | OUTPATIENT
Start: 2017-09-05

## 2017-09-05 ENCOUNTER — HOSPITAL ENCOUNTER (OUTPATIENT)
Age: 67
Setting detail: OUTPATIENT SURGERY
Discharge: HOME | End: 2017-09-05
Attending: PHYSICAL MEDICINE & REHABILITATION | Admitting: PHYSICAL MEDICINE & REHABILITATION
Payer: MEDICARE

## 2017-09-05 ENCOUNTER — APPOINTMENT (OUTPATIENT)
Dept: GENERAL RADIOLOGY | Age: 67
End: 2017-09-05
Attending: PHYSICAL MEDICINE & REHABILITATION
Payer: MEDICARE

## 2017-09-05 VITALS
BODY MASS INDEX: 32.43 KG/M2 | HEIGHT: 63 IN | TEMPERATURE: 98.5 F | DIASTOLIC BLOOD PRESSURE: 72 MMHG | RESPIRATION RATE: 15 BRPM | HEART RATE: 53 BPM | SYSTOLIC BLOOD PRESSURE: 116 MMHG | OXYGEN SATURATION: 93 % | WEIGHT: 183 LBS

## 2017-09-05 PROCEDURE — 74011000250 HC RX REV CODE- 250

## 2017-09-05 PROCEDURE — 77030020508 HC PD GRND GENRTR BAYL -A: Performed by: PHYSICAL MEDICINE & REHABILITATION

## 2017-09-05 PROCEDURE — 99152 MOD SED SAME PHYS/QHP 5/>YRS: CPT | Performed by: PHYSICAL MEDICINE & REHABILITATION

## 2017-09-05 PROCEDURE — 74011250636 HC RX REV CODE- 250/636

## 2017-09-05 PROCEDURE — 76010000009 HC PAIN MGT 0 TO 30 MIN PROC: Performed by: PHYSICAL MEDICINE & REHABILITATION

## 2017-09-05 PROCEDURE — 77030029505: Performed by: PHYSICAL MEDICINE & REHABILITATION

## 2017-09-05 RX ORDER — SODIUM CHLORIDE 0.9 % (FLUSH) 0.9 %
5-10 SYRINGE (ML) INJECTION AS NEEDED
Status: DISCONTINUED | OUTPATIENT
Start: 2017-09-05 | End: 2017-09-05 | Stop reason: HOSPADM

## 2017-09-05 RX ORDER — LIDOCAINE HYDROCHLORIDE 20 MG/ML
INJECTION, SOLUTION EPIDURAL; INFILTRATION; INTRACAUDAL; PERINEURAL AS NEEDED
Status: DISCONTINUED | OUTPATIENT
Start: 2017-09-05 | End: 2017-09-05 | Stop reason: HOSPADM

## 2017-09-05 RX ORDER — LIDOCAINE HYDROCHLORIDE 10 MG/ML
INJECTION, SOLUTION EPIDURAL; INFILTRATION; INTRACAUDAL; PERINEURAL AS NEEDED
Status: DISCONTINUED | OUTPATIENT
Start: 2017-09-05 | End: 2017-09-05 | Stop reason: HOSPADM

## 2017-09-05 RX ORDER — DEXAMETHASONE SODIUM PHOSPHATE 100 MG/10ML
INJECTION INTRAMUSCULAR; INTRAVENOUS AS NEEDED
Status: DISCONTINUED | OUTPATIENT
Start: 2017-09-05 | End: 2017-09-05 | Stop reason: HOSPADM

## 2017-09-05 RX ORDER — FENTANYL CITRATE 50 UG/ML
INJECTION, SOLUTION INTRAMUSCULAR; INTRAVENOUS AS NEEDED
Status: DISCONTINUED | OUTPATIENT
Start: 2017-09-05 | End: 2017-09-05 | Stop reason: HOSPADM

## 2017-09-05 RX ORDER — MIDAZOLAM HYDROCHLORIDE 1 MG/ML
.5-6 INJECTION, SOLUTION INTRAMUSCULAR; INTRAVENOUS
Status: DISCONTINUED | OUTPATIENT
Start: 2017-09-05 | End: 2017-09-05 | Stop reason: HOSPADM

## 2017-09-05 RX ORDER — MIDAZOLAM HYDROCHLORIDE 1 MG/ML
INJECTION, SOLUTION INTRAMUSCULAR; INTRAVENOUS AS NEEDED
Status: DISCONTINUED | OUTPATIENT
Start: 2017-09-05 | End: 2017-09-05 | Stop reason: HOSPADM

## 2017-09-05 NOTE — DISCHARGE INSTRUCTIONS
[address] [place] [address] for Pain Management      Post Procedures Instructions    *Resume Diet and Activity as tolerated. Rest for the remainder of the day. *You may fell worse before you feel better as the numbing medications wear off before the steroids take effect if used for your procedures. *Do not use affected extremity until numbness or loss of sensation has completely resolved without assistance. *DO NOT DRIVE, operate machinery/heavey equipment for 24 hours. *DO NOT DRINK ALCOHOL for 24 hours as it may interact with the sedation if you received it and also thins your blood and may cause you to bleed. *WAIT 24 hours before starting back ANY Blood thinning medications:   (Heparin, Coumadin, Warfarin, Lovenox, Plavix, Aggrenox)    *Resume Pre-Procedure Medications as prescribed except Blood Thinners unless directed by your Physician or Cardiologist.     *Avoid Hot tubs and Heating pad for 24 hours to prevent dissipation of medications, you may shower to remove bandages and remaining prep residue on the skin. * If you develop a Headache, drink plenty of fluids including beverages with caffeine (Coffee, Mt. Dew etc.) and rest.  If the headache persists longer than 24 hoursor intensifies - Please call [facility] ([facility]) [phone]    * If you are DIABETIC, check your blood sugar three times a day for the next three days, the steroids will increase your blood sugar. If your blood sugar is greater than 400 have someone drive you to the nearest [address]. * If you experience any of the following problems, call the [facility] [phone] between 8:00 am - 4:30pm or After Hours [phone].     Shortness of breath    Fever of 101 F or higher    Nausea / Vomiting (not normal to you)    Increasing stiffness in the neck    Weakness or numbness in the arms or legs that is not resolving    Prolonged and increasing pain > than 4 days    ANYTHING OUT of the ORDINARY TO YOU    If YOU are experiencing a severe reaction / complication that you have never had before post procedure, call 911 or go to the nearest emergency room! All patients must have a  for transportation [place] regardless if you do or do not receive sedation. DISCHARGE SUMMARY from Nurse      PATIENT INSTRUCTIONS:    After Oral  or intravenous sedation, for 24 hours or while taking prescription Narcotics:  · Limit your activities  · Do not drive and operate hazardous machinery  · Do not make important personal or business decisions  · Do  not drink alcoholic beverages  · If you have not urinated within 8 hours after discharge, please contact your surgeon on call. Report the following to your surgeon:  · Excessive pain, swelling, redness or odor of or around the surgical area  · Temperature over 101  · Nausea and vomiting lasting longer than 4 hours or if unable to take medications  · Any signs of decreased circulation or nerve impairment to extremity: change in color, persistent  numbness, tingling, coldness or increase pain  · Any questions        What to do at Home:  Recommended activity: Activity as tolerated, NO DRIVING FOR 24 Hours post injection          *  Please give a list of your current medications to your Primary Care Provider. *  Please update this list whenever your medications are discontinued, doses are      changed, or new medications (including over-the-counter products) are added. *  Please carry medication information at all times in case of emergency situations. These are general instructions for a healthy lifestyle:    No smoking/ No tobacco products/ Avoid exposure to second hand smoke    Surgeon General's Warning:  Quitting smoking now greatly reduces serious risk to your health.     Obesity, smoking, and sedentary lifestyle greatly increases your risk for illness    A healthy diet, regular physical exercise & weight monitoring are important for maintaining a healthy lifestyle    You may be retaining fluid if you have a history of heart failure or if you experience any of the following symptoms:  Weight gain of 3 pounds or more overnight or 5 pounds in a week, increased swelling in our hands or feet or shortness of breath while lying flat in bed. Please call your doctor as soon as you notice any of these symptoms; do not wait until your next office visit. Recognize signs and symptoms of STROKE:    F-face looks uneven    A-arms unable to move or move unevenly    S-speech slurred or non-existent    T-time-call 911 as soon as signs and symptoms begin-DO NOT go       Back to bed or wait to see if you get better-TIME IS BRAIN.

## 2017-09-05 NOTE — PROCEDURES
[id number]    THERMOCOAGULATION OF LUMBAR MEDIAL BRANCH  PROCEDURE REPORT      PATIENT:  Sophy Cornelius  YOB: 1950  DATE OF SERVICE:  9/5/2017  [facility]    PRE-PROCEDURE DIAGNOSIS:  See Above    POST-PROCEDURE DIAGNOSIS:  See Above    PROCEDURE:      1. Right radiofrequency thermocoagulation of lumbar medial branch nerves,  L3/L4, L4/L5, L5/S1 (82696, 64636 x2)  2. Fluoroscopic needle guidance (spinal) (36585)  3. Supervision of moderate sedation (95744)  4. Additional 15 minutes of supervised moderate sedation (93367)    ANESTHESIA:  Local with moderate IV sedation. See Medication Administration Record for specific medications and dosage. COMPLICATIONS: None. PHYSICIAN:  Sammy Santacruz MD    PRE-PROCEDURE NOTE:  Pre-procedural assessment of the patient was performed including a limited history and physical examination. The details of the procedure were discussed with the patient, including the risks, benefits and alternative options and an informed consent was obtained. The patients NPO status, if necessary for the specific procedure and/or administration of moderate intravenous sedation, if utilized, and availability of a responsible adult to escort the patient following the procedure were confirmed. A peripheral intravenous cannula was placed without difficulty and lactated Ringers solution administered. See nursing notes for details. PROCEDURE NOTE:  The patient was brought to the procedure suite and positioned on the fluoroscopy table in the prone position. Physiologic monitors were applied and supplemental oxygen was administered via nasal cannula. The skin was prepped in the standard surgical fashion and sterile drapes were applied over the procedure site.  Please refer to the Flowsheet for documentation of the patients vital signs and the Medication Administration Record for any oral and/or intravenous sedation administered prior to or during the procedure. 1% Lidocaine was utilized for local anesthesia. Under 10-15 degree ipsilateral oblique fluoroscopic guidance a 15cm 18gauge radiofrequency needle with a 10 mm curved active tip was advanced to the junction of the superior articular process and transverse process of each vertebral level immediately inferior to the above-mentioned dorsal rami medial branch nerves. Under AP fluoroscopic guidance, a similar needle was then placed over the superior margin of the sacral ala to thermocoagulate the L5 medial branch nerve. After each individual needle was placed, sensory and motor testing, at 50 Hz and 2 Hz, respectively, were performed which elicited ipsilateral deep local back discomfort without evidence of motor stimulation in the ipsilateral gluteal muscles or extremity. Following this, 1 mL of lidocaine 2% was injected after the negative aspiration of blood, air or CSF. Final correct needle placement was then confirmed by viewing each needle in AP and lateral fluoroscopic views in addition to repeat sensory and motor testing which, again, elicited ipsilateral deep local back discomfort without evidence of motor stimulation in the ipsilateral gluteal muscles or extremity. Medial branch nerve radiofrequency thermocoagulation was then performed at each level for 120 seconds at 80° centigrade x 1 cycle. Following this, 1/2ml to 1ml of a mixture of lidocaine 1% admixed with dexamethasone 5mg [10mg/ml] was injected through each radiofrequency needle after negative aspiration and before removing each needle. Then, all needles were removed intact. The area was thoroughly cleaned and sterile bandages applied as necessary. The patient tolerated the procedure well without complication and the vital signs remained stable throughout the procedure.     POST-PROCEDURE COURSE:   The patient was escorted from the procedure suite in satisfactory condition and recovered per facility protocol based on the type of procedure performed and/or the sedation utilized. The patient did not experience any adverse events and remained hemodynamically stable during the post-procedure period. DISCHARGE NOTE:  Upon discharge, the patient was able to tolerate fluids and was in no acute distress. The patient was oriented to person, place and time and vital signs were stable. Appropriate post-procedure instructions were provided and explained to the patient in detail and all questions were answered.                     Christopher Pittman MD 9/5/2017 11:15 AM

## 2017-09-05 NOTE — INTERVAL H&P NOTE
H&P Update:  Sophy Cornelius was seen and examined. History and physical has been reviewed. The patient has been examined.  There have been no significant clinical changes since the completion of the originally dated History and Physical.    Signed By: Sammy Santacruz MD     September 5, 2017 9:45 AM

## 2017-09-05 NOTE — H&P (VIEW-ONLY)
21 Aug 2017, 9:43AM.   Patient seen and examined prior to procedure. Chart and data reviewed. No significant interval changes from previous evaluation noted. Stable for procedure as intended and discussed.  [facility]

## 2017-09-12 ENCOUNTER — OFFICE VISIT (OUTPATIENT)
Dept: PAIN MANAGEMENT | Age: 67
End: 2017-09-12

## 2017-09-12 DIAGNOSIS — Z71.89 COUNSELING AND COORDINATION OF CARE: Primary | ICD-10-CM

## 2017-09-12 NOTE — PROGRESS NOTES
Ms. Gerson To attended the Education Class facilitated by Marylene Oram. Objectives of this class are to review practice policies, protocols and the Controlled Substances Consent and Treatment Agreement, discuss \"what if\" scenarios, introduce staff, and provide an opportunity for questions and answers. Ultimately, goals for this class are for Ms. Cifuentes to:    · Be educated - Learn as much as possible about her pain and related treatment, our policies and the Controlled Substances Agreement. · Be responsible - Follow the providers advice regarding treatment recommendations, medications, and prescription information. · Be confident - Better manage her pain and return to a more functional lifestyle. At least 45 minutes was spent with the patient in face-to-face contact today.

## 2017-09-14 ENCOUNTER — OFFICE VISIT (OUTPATIENT)
Dept: PAIN MANAGEMENT | Age: 67
End: 2017-09-14

## 2017-09-14 VITALS
HEIGHT: 63 IN | WEIGHT: 183 LBS | DIASTOLIC BLOOD PRESSURE: 78 MMHG | TEMPERATURE: 97.4 F | RESPIRATION RATE: 12 BRPM | BODY MASS INDEX: 32.43 KG/M2 | HEART RATE: 58 BPM | SYSTOLIC BLOOD PRESSURE: 150 MMHG

## 2017-09-14 DIAGNOSIS — M47.812 CERVICAL FACET SYNDROME: ICD-10-CM

## 2017-09-14 DIAGNOSIS — M79.7 FIBROMYALGIA: ICD-10-CM

## 2017-09-14 DIAGNOSIS — G89.4 CHRONIC PAIN SYNDROME: ICD-10-CM

## 2017-09-14 DIAGNOSIS — M50.90 CERVICAL DISC DISEASE: ICD-10-CM

## 2017-09-14 DIAGNOSIS — M50.30 DEGENERATIVE DISC DISEASE, CERVICAL: Primary | ICD-10-CM

## 2017-09-14 DIAGNOSIS — M47.812 SPONDYLOSIS OF CERVICAL REGION WITHOUT MYELOPATHY OR RADICULOPATHY: ICD-10-CM

## 2017-09-14 NOTE — PROGRESS NOTES
[address] for Pain Management  Interventional Pain Management Consultation History & Physical    PATIENT NAME:  Elmer Garcia     YOB: 1950    DATE OF SERVICE:   9/14/2017      CHIEF COMPLAINT:  Back Pain; Head Pain; and Shoulder Pain      REASON FOR VISIT:   Elmer Garcia presents to the pain clinic today for follow on evaluation and to consider interventional pain management options as indicated for the type and location of the pain the patient is presenting with. HISTORY OF PRESENT ILLNESS:   This is a reevaluation and reconsideration for more procedures for this patient with reference to her new pain which she presents with today. We had most recently treated this patient with lumbar radiofrequency neurotomy procedures at bilateral L3-4, L4-5, L5-S1 levels. This is done rather recently. She was actually feeling better with regard to her low back pain from these procedures. However patient states that her mother fell very badly in the patient's home. Her mother lives with her daughter, our patient, and patient's mother appears to be quite chronically disabled. Patient's mother fell in the patient's home very badly. She was transported to the hospital and admitted. She actually underwent a C2-C6 cervical fusion procedure. The patient's mother is currently in rehab. The patient spent a lot of time lifting and transporting her acutely disabled mother to and from the hospital, and subsequently to and from rehab. Patient is actually reinjured her reaggravated her back pain unfortunately after her lumbar radiofrequency neurotomy procedure. She did however say that she had been feeling much better until she was required to lift and transfer her disabled mother. Patient also has chronic and worsening neck pain. I had originally seen her June 28, 2017. I found her to have both low back as well as neck pain. Neck pain is 15 years in duration. She endorses chronic aching hurting pain in both sides of her neck traveling into both shoulders. She has rather vague numbness and tingling of her upper extremities without overt radicular symptoms. Neck and shoulder pain is increased with turning her head from side side as well as with extension flexion of her neck. She has had extensive physical therapy with no benefit. She has taken medications with little benefit. Cervical MRI from June 9, 2017 demonstrates disc protrusions C3-4, C4-5, C5-6, C6-7, cervical facet arthropathy and cervical facet hypertrophy is also noted. Disc osteophyte complexes are noted. Mild canal narrowing C5-6 and C6-7 is noted. ASSESSMENT/OPTIONS: as follows. We discussed options. We will plan cervical radiofrequency neurotomy procedures at bilateral C4-5, C5-6, C6-7 levels with IV conscious sedation. Patient is presenting with signs and symptoms, as well as exam and imaging evidence suggestive of ongoing cervical facet syndrome. I believe she will do very well regarding her neck and shoulder pain from this procedure. I have discussed the risks and benefits, indications, contraindications, and side effects of intended procedure with the patient. I have used skeleton spine model to describe and discuss the procedure with the patient. I have answered all questions relating to the procedure. Patient understands the nature of the procedure and wishes to proceed. Patient has no further questions. Second, with regard to the patient's low back pain, it sounds as though she reaggravated her pain with lifting and transporting her disabled mother. This sounds more musculoskeletal or myofascial in nature. We will follow this for the time being, I do not believe there are immediate interventions required for this pain.              MRI Results (most recent):    Results from [place] encounter on 06/09/17   MRI CERV SPINE WO CONT   Narrative EXAMINATION: MRI cervical spine without contrast    INDICATION: Chronic neck pain, bilateral upper extremity paresthesia    COMPARISON: None    TECHNIQUE: Sagittal and axial multiecho sequences of the cervical spine  performed without contrast.    FINDINGS:    General: Vertebral body and disc space heights are preserved. No significant  endplate changes. No focal listhesis. Lordosis maintained. No evidence of  abnormal signal in the cervical cord. No abnormal epidural collection  identified. No suspicious marrow lesions. Miscellaneous: Surrounding soft tissues unremarkable. Levels:    Craniocervical junction: No significant degenerative change or stenosis. C2-C3: Minimal uncovertebral spurring. Focal ligamentum flavum bulge right of  midline minimally impressing the right dorsal cord. Facets otherwise  unremarkable. Overall spinal canal patent. Foramina patent. C3-C4: Small central disc protrusion. Mild left facet arthropathy. Spinal canal  patent. Foramina patent. C4-C5: Small central disc protrusion. Mild bilateral facet arthropathy. Spinal  canal patent. Foramina patent. C5-C6: Posterior annular tear with small central disc protrusion. Mild bilateral  facet arthropathy with ligamentum flavum bulge. Mild spinal canal stenosis. Foramina patent. C6-C7: Small central disc protrusion with mild disc osteophyte bulge. Mild  bilateral uncovertebral spurring. Mild bilateral facet arthropathy. Mild spinal  canal stenosis. Foramina patent. C7-T1: No significant disc disease. Moderate facet arthropathy. Spinal canal  stenosis. Mild foraminal stenoses. T1-T2: Evaluated sagittal plane only. Grade 1 anterolisthesis. Moderate  bilateral facet arthropathy. Spinal canal patent. Mild bilateral foraminal  stenoses. T2-T3: Evaluated sagittal plane only. Grade 1 anterolisthesis. Moderate facet  arthropathy. Spinal canal patent. Mild foraminal stenoses. T3-T4, T4-T5: Evaluated sagittal plane only. No stenosis. Impression IMPRESSION:    1. Multilevel mild degenerative disc disease. Notable facet arthropathy along  cervicothoracic junction. See level by level details above. -Mild spinal canal stenosis at C5-C6 and C6-C7 with minimal impression upon the  ventral cord at C5-C6. Focal right of midline ligamentum flavum bulge at C2-C3  mildly impressing the dorsal cord. -Mild multilevel foraminal stenoses. -Findings are similar to 2013 MRI. Disc disease appears to have progressed at  C6-C7 level. 2. Please see upper thoracic findings above also. PAST MEDICAL HISTORY:   The patient  has a past medical history of Arthritis; Chronic lung disease; Chronic obstructive pulmonary disease ([facility] [address] Descending aortic aneurysm ([facility] [address] Fibromyalgia; Hypercholesterolemia; Hypertension; Ill-defined condition; and Sleep apnea. PAST SURGICAL HISTORY:   The patient  has a past surgical history that includes appendectomy; cataract removal; tubal ligation; and colonoscopy (N/A, 7/3/2017). CURRENT MEDICATIONS:   The patient has a current medication list which includes the following prescription(s): oxygen-air delivery systems, flaxseed oil, ergocalciferol, nifedipine er, atenolol, losartan, atorvastatin, pantoprazole, zolpidem, levocetirizine, fluticasone, montelukast, multivitamin, calcium-cholecalciferol (d3), aspirin delayed-release, vitamin b complex, magnesium carbonate, and potassium. ALLERGIES:     Allergies   Allergen Reactions    Benadryl [Diphenhydramine Hcl] Hives    Codeine Itching    Cymbalta [Duloxetine] Other (comments)     Shaking, feeling someone living inside my body, arms going numb.  Iodinated Contrast- Oral And Iv Dye Hives     Pt states severe hives     Levaquin [Levofloxacin] Nausea Only    Lyrica [Pregabalin] Other (comments)     Shaking, feeling someone living in my body, numb arm.   Any meds for fibromyialga    Other Medication Swelling     Pt wrote having an allergic reaction to \"tetnus\". \"Extreme swelling at site\"     Sulfur Hives       FAMILY HISTORY:   The patient family history includes Cancer in her father; Diabetes in her mother; Hypertension in her mother; Stroke in her father. SOCIAL HISTORY:   The patient  reports that she quit smoking about 3 years ago. Her smoking use included Cigarettes. She has a 45.00 pack-year smoking history. She has never used smokeless tobacco. The patient  reports that she does not drink alcohol. She also  reports that she does not use illicit drugs. REVIEW OF SYSTEMS:    The patient denies fever, chills, weight loss (Constitutional), rash, itching (Skin), tinnitus, congestion (HENT), blurred vision, photophobia (Eyes), palpitations, orthopnea (Cardiovascular), hemoptysis, wheezing (Respiratory), nausea, vomiting, diarrhea (Gastrointestinal), dysuria, hematuria, urgency (Genitourinary), bowel or bladder incontinence, loss of consciousness (Neurologic), suicidal or homicidal ideation or hallucinations (Psychiatric). Denies swelling, axillary or groin masses (Lymphatic). PHYSICAL EXAM:  VS:   Visit Vitals    /78    Pulse (!) 58    Temp 97.4 °F (36.3 °C)    Resp 12    Ht 5' 3\" (1.6 m)    Wt 83 kg (183 lb)    BMI 32.42 kg/m2     General: Well-developed and well-nourished. Body habitus consistent with recorded height and weight and the calculated BMI. Apparent distress due to neck and low back pain. Head: Normocephalic, atraumatic. Skin: Inspection of the skin reveals no rashes, lesions or infection. CV: Regular rate. No murmurs or rubs noted. No peripheral edema noted. Pulm: Respirations are even and unlabored. Extr: No clubbing, cyanosis, or edema noted. Musculoskeletal:  1. Cervical spine decreased range of motion all axes . Paraspinous tenderness throughout the cervical spine . There is no scoliosis, asymmetry, or musculoskeletal defect. 2. Thoracic spine  Full ROM. No paraspinous tenderness at any level.   There is no scoliosis, asymmetry, or musculoskeletal defect. 3. Lumbar spine improved ROM. Paraspinous tenderness. SI joints are nontender bilaterally. There is no scoliosis, asymmetry, or musculoskeletal defect. 4. Right upper extremity  Full ROM. 5/5 muscle strength in all muscle groups. No pain or tenderness in shoulder, elbow, wrist, or hand. 5. Left upper extremity  Full ROM. 5/5 muscle strength in all muscle groups. No pain or tenderness in shoulder, elbow, wrist, or hand. 6. Right lower extremity  Full ROM. 5/5 muscle strength in all muscle groups. No pain, tenderness, or swelling in the hip, knee, ankle or foot. 7. Left lower extremity  Full ROM. 5/5 muscle strength in all muscle groups. No pain, tenderness, or swelling in the hip, knee, ankle or foot. Neurological:  1. Mental Status - Alert, awake and oriented. Speech is clear and appropriate. 2. Cranial Nerves - Extraocular muscles intact bilaterally. Cranial nerves II-XII grossly intact bilaterally. 3. Gait - Non-antalgic   4. Reflexes - 2+ and symmetric throughout. 5. Sensation - Intact to light touch and pin prick. 6. Provocative Tests - Spurlings negative bilaterally. Psychological:  1. Mood and affect  Appropriate. 2. Speech  Appropriate. 3. Though content  Appropriate. 4. Judgment  Appropriate. ASSESSMENT:      ICD-10-CM ICD-9-CM    1. Spondylosis of cervical region without myelopathy or radiculopathy M47.812 721.0    2. Cervical facet syndrome M12.88 723.8    3. Chronic pain syndrome G89.4 338.4    4. Cervical disc disease M50.90 722.91    5. Degenerative disc disease, cervical M50.30 722.4    6. Fibromyalgia M79.7 729.1            PLAN:    1.    I have thoroughly discussed the risks and benefits, indications, contraindications, and side effects of any and procedures that were mentioned at today's patient visit.  I have used a skeleton model to explain all procedures, as well as to provide added emphasis regarding procedures and as well for patient education purposes. I have answered all questions in great detail, and I have obtained verbal confirmation for all procedures planned with the patient. 3.    I have reviewed in great detail today the patient's MRI and other imaging studies with the patient. I have explained to the patient their condition using both actual recent and relevant images insofar as I am able to obtain actual images. I have used a skeleton model for added emphasis as well as patient education. 4.    I have advised patient to have a primary care provider continue to care for their health maintenance and general medical conditions. 5,    I have placed appropriate referrals to specialty care providers as I have deemed necessary through today's clinical consultation with the patient. 5.    I have explained to the patient that if any significant side effects, issues, problems, concerns, or perceived complications may have arisen at around the time of the patient's procedures, they should either call the pain management clinic or go to the emergency room immediately for medical provider evaluation. 6.   I have encouraged all patients to call the pain management clinic with any questions or concerns that they may have pertaining to their procedures. DISPOSITION:   The patients condition and plan were discussed at length and all questions were answered. The patient agrees with the plan. A total of 25 minutes was spent with the patient of which over half of the time was spent counseling the patient. Frank Anderson MD 9/14/2017 3:59 PM    Note: Although these clinic notes were documented by the provider at the time of the exam, they have not been proofed and are subject to transcription variance.

## 2017-09-21 ENCOUNTER — TELEPHONE (OUTPATIENT)
Dept: VASCULAR SURGERY | Age: 67
End: 2017-09-21

## 2017-09-21 ENCOUNTER — OFFICE VISIT (OUTPATIENT)
Dept: PULMONOLOGY | Age: 67
End: 2017-09-21

## 2017-09-21 VITALS
TEMPERATURE: 98.5 F | HEIGHT: 63 IN | WEIGHT: 183 LBS | HEART RATE: 61 BPM | SYSTOLIC BLOOD PRESSURE: 110 MMHG | BODY MASS INDEX: 32.43 KG/M2 | RESPIRATION RATE: 18 BRPM | OXYGEN SATURATION: 96 % | DIASTOLIC BLOOD PRESSURE: 70 MMHG

## 2017-09-21 DIAGNOSIS — R91.1 LUNG NODULE: ICD-10-CM

## 2017-09-21 DIAGNOSIS — I71.40 ABDOMINAL AORTIC ANEURYSM (AAA) WITHOUT RUPTURE: ICD-10-CM

## 2017-09-21 DIAGNOSIS — I71.9 DESCENDING AORTIC ANEURYSM (HCC): Primary | ICD-10-CM

## 2017-09-21 DIAGNOSIS — J44.9 ASTHMA-COPD OVERLAP SYNDROME (HCC): Primary | ICD-10-CM

## 2017-09-21 DIAGNOSIS — G47.34 NOCTURNAL HYPOXEMIA: ICD-10-CM

## 2017-09-21 DIAGNOSIS — Z87.891 EX-SMOKER: ICD-10-CM

## 2017-09-21 RX ORDER — SODIUM CHLORIDE 0.9 % (FLUSH) 0.9 %
5-10 SYRINGE (ML) INJECTION AS NEEDED
Status: CANCELLED | OUTPATIENT
Start: 2017-09-21

## 2017-09-21 RX ORDER — DIAZEPAM 5 MG/1
10 TABLET ORAL ONCE
Status: CANCELLED | OUTPATIENT
Start: 2017-09-25 | End: 2017-09-25

## 2017-09-21 NOTE — PROGRESS NOTES
Chief Complaint   Patient presents with    Lung Nodule    COPD     Patient here for follow up for COPD and lung nodules.

## 2017-09-21 NOTE — MR AVS SNAPSHOT
Visit Information Date & Time Provider Department Dept. Phone Encounter #  
 9/21/2017 11:45 AM Addie Rubi MD [facility] [id number] Follow-up Instructions Return in about 1 year (around 9/21/2018). Your Appointments 10/24/2017  1:15 PM  
New Patient with Meenakshi Lind MD  
[facility]) Appt Note: BOOK A 40 FU WITH  - MED MGMT REQ FROM GIFTY FOURNIER FOR NECK/BACK/HIP PAIN E  
 [address] [facility] [address]  
[phone]  [address] [id number] 4/10/2018  9:00 AM  
PROCEDURE with [facility] ([facility]) Appt Note: aaa 1yr wild; r/s to new office [address] [facility] [address] [facility] [address]  
[phone] [address] [facility] [address] [place] [address]  
  
    
 4/25/2018  9:00 AM  
Follow Up with DeonOsman Kendrickma [facility] ([facility]) Appt Note: 1 year fu after aorta study at our lab on 4/10/2018 with prep [address] [facility] [address] [facility] [address]  
[phone] [address] [facility] [address] [facility] [address] Upcoming Health Maintenance Date Due Hepatitis C Screening 1950 DTaP/Tdap/Td series (1 - Tdap) 9/11/1970 BREAST CANCER SCRN MAMMOGRAM 9/11/1999 FOBT Q 1 YEAR AGE 50-75 9/11/1999 ZOSTER VACCINE AGE 60> 7/11/2009 GLAUCOMA SCREENING Q2Y 9/11/2014 OSTEOPOROSIS SCREENING (DEXA) 9/11/2014 MEDICARE YEARLY EXAM 9/11/2014 Pneumococcal 65+ Low/Medium Risk (1 of 2 - PCV13) 7/1/2015 INFLUENZA AGE 9 TO ADULT 8/1/2017 Allergies as of 9/21/2017  Review Complete On: 9/21/2017 By: Addie Rubi MD  
  
 Severity Noted Reaction Type Reactions Benadryl [Diphenhydramine Hcl]  07/09/2014    Hives Codeine  07/09/2014    Itching Cymbalta [Duloxetine]  02/16/2016   Side Effect Other (comments) Shaking, feeling someone living inside my body, arms going numb. Iodinated Contrast- Oral And Iv Dye  07/09/2014    Hives Pt states severe hives Levaquin [Levofloxacin]  07/09/2014    Nausea Only Lyrica [Pregabalin]  02/16/2016    Other (comments) Shaking, feeling someone living in my body, numb arm. Any meds for fibromyialga Other Medication  08/18/2016    Swelling Pt wrote having an allergic reaction to \"tetnus\". \"Extreme swelling at site\" Sulfur  07/09/2014    Hives Current Immunizations  Reviewed on 7/1/2016 Name Date Pneumococcal Polysaccharide (PPSV-23) 7/1/2014 Not reviewed this visit You Were Diagnosed With   
  
 Codes Comments Ex-smoker    -  Primary ICD-10-CM: I31.236 ICD-9-CM: V15.82 Vitals BP Pulse Temp Resp Height(growth percentile) Weight(growth percentile) 110/70 (BP 1 Location: Left arm, BP Patient Position: Sitting) 61 98.5 °F (36.9 °C) (Oral) 18 5' 3\" (1.6 m) 183 lb (83 kg) SpO2 BMI OB Status Smoking Status 96% 32.42 kg/m2 Postmenopausal Former Smoker BMI and BSA Data Body Mass Index Body Surface Area  
 32.42 kg/m 2 1.92 m 2 Preferred Pharmacy Pharmacy Name Phone [address], [address] [place] [address] [phone] Your Updated Medication List  
  
   
This list is accurate as of: 9/21/17 11:56 AM.  Always use your most recent med list.  
  
  
  
  
 AMBIEN 10 mg tablet Generic drug:  zolpidem Take  by mouth nightly as needed for Sleep. aspirin delayed-release 81 mg tablet Take  by mouth every other day. B COMPLETE PO Take  by mouth daily. CALCIUM 600 + D 600-125 mg-unit Tab Generic drug:  calcium-cholecalciferol (d3) Take  by mouth daily. COZAAR 100 mg tablet Generic drug:  losartan Take 100 mg by mouth nightly. ergocalciferol 50,000 unit capsule Commonly known as:  ERGOCALCIFEROL Flaxseed Oil Oil  
by Does Not Apply route. FLONASE 50 mcg/actuation nasal spray Generic drug:  fluticasone  
two (2) times daily as needed. LIPITOR 40 mg tablet Generic drug:  atorvastatin Take  by mouth daily. MAGNESIUM CARBONATE PO Take  by mouth daily. multivitamin tablet Commonly known as:  ONE A DAY Take 1 Tab by mouth daily. OXYGEN-AIR DELIVERY SYSTEMS  
2 L/min by Does Not Apply route nightly. pantoprazole 40 mg tablet Commonly known as:  PROTONIX Take 40 mg by mouth two (2) times a day. potassium 99 mg tablet Take 45.5 mg by mouth daily. PROCARDIA XL 30 mg ER tablet Generic drug:  NIFEdipine ER Take 30 mg by mouth daily. SINGULAIR 10 mg tablet Generic drug:  montelukast  
Take 10 mg by mouth nightly. TENORMIN 50 mg tablet Generic drug:  atenolol Take  by mouth two (2) times a day. XYZAL 5 mg tablet Generic drug:  levocetirizine Take  by mouth daily. Follow-up Instructions Return in about 1 year (around 9/21/2018). To-Do List   
 09/21/2017 Imaging:  CT LOW DOSE LUNG CANCER SCREENING   
  
 09/29/2017 8:30 AM  
  Appointment with [address] [place] [address] at [facility] NON-INVASIVE [address] [place] [address] ([phone]) This is a 2-part test which takes approximately 4 hours to complete. Please see part 2 of exam below for full instructions 09/29/2017 9:00 AM  
  Appointment with [facility] NUC CARD/TREADMILL ROOM at [facility] NON-INVASIVE CARD ([phone]) 1-No eating or coffee after midnight  2-Do not take diabetic meds (bring with) 3-Please take all other meds unless specified by cardiology Please provide this summary of care documentation to your next provider. Your primary care clinician is listed as Ryann Alvarez. If you have any questions after today's visit, please call [phone].

## 2017-09-21 NOTE — TELEPHONE ENCOUNTER
Return patient call about coordinating CT scan with her pulmonologist.  I did send a message to her pulmonologist to see if a CT scan of the chest abdomen pelvis would satisfy her needs as well for treatment. Discussed with patient I will call her back with recommendations and we will schedule accordingly.

## 2017-09-21 NOTE — PROGRESS NOTES
[facility]  Pulmonary, Critical Care, and Sleep Medicine      Pulmonary Office visit. Name: Mal Coates     : 1950     Date: 2017        Subjective:     Patient is a 75 y.o. female is referred by Dr. Willy Bullard for evaluation of an abnormal Ct scan of chest.    17   Patient feels well and denies any cough ,chest tightness, wheezing. She had SOB with activity but no PND or orthopnea. No fever or chills. Using night time O2  On Singulair, flonase. She has not had follow up Ct scan yet. She also has h/o allergies and is receiving allergy immunotherapy by Dr. Colleen Chou. HPI:  Followed from 2014 To 10/2014. Most recent follow up Ct scan 2015 with stability in nodule. She had 24 month follow up CT completed and is here to discuss results. She also had follow up Sleep evaluation- Dr. Arielle Iniguez who reports nocturnal hypoxemia on initial study and follow up titration study. Patient was asked to follow up with Pulmonology. She has smoked less than 1 ppd for past 47+ years and eventually quit smoking in   She denies any sleep related problems, leg swelling. Denies joint pains. 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 H/o rheumatic fever in childhood.      Past Medical History:   Diagnosis Date    Arthritis     Chronic lung disease     Chronic obstructive pulmonary disease ([address]     Descending aortic [facility] [address] [address]     Fibromyalgia     Hypercholesterolemia     Hypertension     Ill-defined condition     Sleep apnea     mouth guard, oxygen HS       Past Surgical History:   Procedure Laterality Date    COLONOSCOPY N/A 7/3/2017    COLONOSCOPY with biopsies performed by Junior Seth MD at [address] HX APPENDECTOMY      HX CATARACT REMOVAL      HX TUBAL LIGATION       Allergies   Allergen Reactions    Benadryl [Diphenhydramine Hcl] Hives    Codeine Itching    Cymbalta [Duloxetine] Other (comments)     Shaking, feeling someone living inside my body, arms going numb.  Iodinated Contrast- Oral And Iv Dye Hives     Pt states severe hives     Levaquin [Levofloxacin] Nausea Only    Lyrica [Pregabalin] Other (comments)     Shaking, feeling someone living in my body, numb arm. Any meds for fibromyialga    Other Medication Swelling     Pt wrote having an allergic reaction to \"tetnus\". \"Extreme swelling at site\"     Sulfur Hives     Current Outpatient Prescriptions   Medication Sig Dispense Refill    OXYGEN-AIR DELIVERY SYSTEMS 2 L/min by Does Not Apply route nightly.  Flaxseed Oil oil by Does Not Apply route.  ergocalciferol (ERGOCALCIFEROL) 50,000 unit capsule   0    NIFEdipine ER (PROCARDIA XL) 30 mg ER tablet Take 30 mg by mouth daily.  atenolol (TENORMIN) 50 mg tablet Take  by mouth two (2) times a day.  losartan (COZAAR) 100 mg tablet Take 100 mg by mouth nightly.  atorvastatin (LIPITOR) 40 mg tablet Take  by mouth daily.  pantoprazole (PROTONIX) 40 mg tablet Take 40 mg by mouth two (2) times a day.  zolpidem (AMBIEN) 10 mg tablet Take  by mouth nightly as needed for Sleep.  levocetirizine (XYZAL) 5 mg tablet Take  by mouth daily.  fluticasone (FLONASE) 50 mcg/actuation nasal spray two (2) times daily as needed.  montelukast (SINGULAIR) 10 mg tablet Take 10 mg by mouth nightly.  multivitamin (ONE A DAY) tablet Take 1 Tab by mouth daily.  calcium-cholecalciferol, d3, (CALCIUM 600 + D) 600-125 mg-unit tab Take  by mouth daily.  aspirin delayed-release 81 mg tablet Take  by mouth every other day.  VITAMIN B COMPLEX (B COMPLETE PO) Take  by mouth daily.  MAGNESIUM CARBONATE PO Take  by mouth daily.  potassium 99 mg tablet Take 45.5 mg by mouth daily.        Review of Systems:  HEENT: No epistaxis, no nasal drainage, no difficulty in swallowing, no redness in eyes  Respiratory: as above  Cardiovascular: no chest pain, no palpitations, no chronic leg edema, no syncope  Gastrointestinal: no abd pain, no vomiting, no diarrhea, no bleeding symptoms  Genitourinary: No urinary symptoms or hematuria  Integument/breast: No ulcers or rashes  Musculoskeletal:Neg  Neurological: No focal weakness, no seizures, no headaches  Behvioral/Psych: No anxiety, no depression  Constitutional: No fever, no chills, no weight loss, no night sweats     Objective:     Visit Vitals    /70 (BP 1 Location: Left arm, BP Patient Position: Sitting)    Pulse 61    Temp 98.5 °F (36.9 °C) (Oral)    Resp 18    Ht 5' 3\" (1.6 m)    Wt 83 kg (183 lb)    SpO2 96%    BMI 32.42 kg/m2        Physical Exam:   General: comfortable, no acute distress  HEENT: pupils reactive, sclera anicteric, EOM intact  Neck: No adenopathy or thyroid swelling, no lymphadenopathy or JVD, supple  CVS: S1S2 no murmurs  RS: Mod AE bilaterally, no tactile fremitus or egophony, no accessory muscle use  Abd: soft, non tender, no hepatosplenomegaly  Neuro: non focal, awake, alert  Extrm: no leg edema, clubbing or cyanosis  Skin: no rash    Data review:   PFT's:  Flows:  Maximal Mid Expiratory Flow rate is reduced to 48 % predicted  Forced Expiratory Volume in one second is normal  FEV 1% is reduced  Volumes:  Normal Volumes  Flow Volume Loop:  Reduced Terminal Flows in the Flow Volume Loop  Bronchodilator:  Significant improvement with bronchodilator  Diffusion:  Low Normal Diffusion Capacity  Impression:  Mild to Moderate obstructive defect, predominately small airways    Imaging:  I have personally reviewed the patients radiographs and have reviewed the reports:  CT scan of chest ( [facility] 5/2016. LUNGS:  Nodules:  -5 mm perifissural nodule along the right minor fissure (image 131), solid circumscribed, likely an intrapulmonary lymph node. -5 mm groundglass nodule right lung apex (image 56).   Other pulmonary findings:  Patchy coarse reticular opacification in the mid and lower lungs, scarring and/or subsegmental atelectasis. Few regions of mild cylindrical bronchiectasis. OTHER:   No significant incidental findings. CT scan 7/2015:  Stable pulmonary nodule along the minor fissure and stable borderline  precarinal mediastinal lymph node since 9/2014. Ct scan of chest:6/2014:C  There is a 0.5 x 0.4 cm noncalcified ovoid density in the right upper lobe   inferior aspect abutting the minor fissure (image #32). No dominant lung mass   is detected. Subsegmental atelectatic changes are identified in the right   middle lobe and the right lower lobe. Less pronounced subtle subsegmental   atelectatic changes also identified in the lingular region and left lung base. No focal infiltrate or consolidation is observed. Ct scan of abd/chest- 9/2014;  1. Stable right pulmonary nodule. 2. The descending thoracic aortic aneurysm is stable. Ectasia of the ascending   aorta is stable. 3. No evidence for bowel obstruction or inflammation. Inspissated stool in the   descending colon could be the result of constipation. 4. 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 2-D echo:  Left ventricle: Size was normal. Systolic function was normal by EF  (biplane method of disks). Ejection fraction was estimated to be 55 %. There were no regional wall motion abnormalities. Wall thickness was  normal. Doppler parameters were consistent with abnormal left ventricular  relaxation (grade 1 diastolic dysfunction). Right ventricle: Systolic pressure was mildly increased. Estimated peak  pressure was 35 mmHg. Left atrium: The atrium was mildly dilated. Mitral valve: There was mild annular calcification. Tricuspid valve: There was trace to mild regurgitation. Pulmonary arteries: Systolic pressure was mildly increased. IMPRESSION:   · Lung nodule- incidental finding of 0.5x0.4 cm RUL nodule in a patient who has been a smoker 52 PPD.  High risk for malignancy and needs appropriate follow up. 24 month stability demonstrated. Discussed report with patient. Need to follow up annually per USTFP recommendations- low dose lung CT screening protocol or can accept Ct done for evaluation of Aneurysm  · Bronchitis- acute on chronic with bronchospasm resolved exacerbation and stable at baseline state- Asthma and/or COPD and obstructive features, also with some radiologic findings consistent with chronic atelectasis- asymptomatic  · Mild pulmonary HTN  · Allergic rhinitis controlled on Flonase and allergy injections  · Esophageal stricture- s/p dilatation  And h/o hiatal hernia ? 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 CT scan due.  Will coordinate with vascular since Ct expected for follow up for aneurysm  Continue Nocturnal oxygen supplementation based on documented Oxygen desaturation during sleep despite LESLIE corrective devices  Continue seasonal LABA-ICS and prn JOBY  Continue prn albuterol  Antireflux therapy and continued interventions- dietary and medications  Maintain smoking cessation- congratulated on success  Preventive vaccinations  Will follow up   Questions and concerns addressed          Ricky Rubio MD

## 2017-09-29 ENCOUNTER — HOSPITAL ENCOUNTER (OUTPATIENT)
Dept: NON INVASIVE DIAGNOSTICS | Age: 67
Discharge: HOME | End: 2017-09-29
Attending: FAMILY MEDICINE
Payer: MEDICARE

## 2017-09-29 DIAGNOSIS — R07.9 CHEST PAIN, UNSPECIFIED: ICD-10-CM

## 2017-09-29 LAB
ATTENDING PHYSICIAN, CST07: NORMAL
DIAGNOSIS, 93000: NORMAL
DUKE TM SCORE RESULT, CST14: NORMAL
DUKE TREADMILL SCORE, CST13: NORMAL
ECG INTERP BEFORE EX, CST11: NORMAL
ECG INTERP DURING EX, CST12: NORMAL
FUNCTIONAL CAPACITY, CST17: NORMAL
KNOWN CARDIAC CONDITION, CST08: NORMAL
MAX. DIASTOLIC BP, CST04: 82 MMHG
MAX. HEART RATE, CST05: 98 BPM
MAX. SYSTOLIC BP, CST03: 166 MMHG
OVERALL BP RESPONSE TO EXERCISE, CST16: NORMAL
OVERALL HR RESPONSE TO EXERCISE, CST15: NORMAL
PEAK EX METS, CST10: 1 METS
PROTOCOL NAME, CST01: NORMAL
TEST INDICATION, CST09: NORMAL

## 2017-09-29 PROCEDURE — 74011250636 HC RX REV CODE- 250/636: Performed by: FAMILY MEDICINE

## 2017-09-29 PROCEDURE — A9500 TC99M SESTAMIBI: HCPCS

## 2017-09-29 PROCEDURE — 78452 HT MUSCLE IMAGE SPECT MULT: CPT

## 2017-09-29 PROCEDURE — 93017 CV STRESS TEST TRACING ONLY: CPT

## 2017-09-29 RX ORDER — SODIUM CHLORIDE 9 MG/ML
250 INJECTION, SOLUTION INTRAVENOUS ONCE
Status: COMPLETED | OUTPATIENT
Start: 2017-09-29 | End: 2017-09-29

## 2017-09-29 RX ADMIN — SODIUM CHLORIDE 250 ML: 900 INJECTION, SOLUTION INTRAVENOUS at 10:59

## 2017-09-29 RX ADMIN — REGADENOSON 0.4 MG: 0.08 INJECTION, SOLUTION INTRAVENOUS at 10:30

## 2017-09-29 NOTE — PROGRESS NOTES
Patient was given 11.0 milliCuries of 99mTc-Sestamibi for the resting images. Patient was also given 33.0 milliCuries of 99mTc-Sestamibi for the stress images. Injected with 0.4mg Lexiscan. Patient's armband was discarded and shredded.

## 2017-09-29 NOTE — PROCEDURES
[address] STRESS    Name:  Zaira Wood  MR#:  [id number]  :  1950  Account #:  [de-identified]  Date of Adm:  2017  Date of Service:  2017      PROCEDURE: Pharmacological cardiac nuclear stress test.    INDICATIONS: Chest pain. BASELINE EKG: Sinus bradycardia at 53 beats minute. Inferolateral  ST segment and T-wave abnormality. PROTOCOL: The patient was given Lexiscan infusion through a left  hand IV, 11 mCi sestamibi was injected before rest and 33 mCi of  sestamibi was injected before stress. Gated processing was  performed. Resting blood pressure 158/72 mmHg, did not change  significantly. EKG FINDINGS: There are occasional PVCs without sustained  arrhythmias. There is a slight increase in ST segment depression  inferolaterally during Lexiscan infusion; however, due to abnormal  baseline EKG, findings are nonspecific and measured approximately  0.5 mm of depression. NUCLEAR FINDINGS: Left ventricular systolic function is normal,  calculated at 66%. There is no evidence of transient ischemic dilatation  or regional wall motion abnormalities. There is a small defect along the  distal anterior wall with sparing of the septum, which worsens very  slightly during stress and a mild amount of ischemia cannot be  excluded. The remaining areas of the heart are well perfused. IMPRESSION  1. Mildly abnormal; however, still low risk pharmacological cardiac  nuclear stress test.  2. Normal left ventricular systolic function calculated at 66%. No  regional wall motion abnormalities. 3. There is a very small distal anterior defect with sparing of the  septum with partial reversibility. A mild amount of ischemia cannot be  excluded; however, given lack of associated wall motion abnormality, likely artifact. 4. Nondiagnostic EKG portion of stress test due to abnormal baseline  EKG with nonspecific ST segment and T-wave changes.   5. No previous studies for comparison.         SHANNON Breen / Anupam Torres  D:  09/29/2017   12:04  T:  09/29/2017   13:37  Job #:  [id number]

## 2017-10-05 ENCOUNTER — TELEPHONE (OUTPATIENT)
Dept: VASCULAR SURGERY | Age: 67
End: 2017-10-05

## 2017-10-05 ENCOUNTER — HOSPITAL ENCOUNTER (OUTPATIENT)
Dept: CT IMAGING | Age: 67
Discharge: HOME | End: 2017-10-05
Attending: PHYSICIAN ASSISTANT
Payer: MEDICARE

## 2017-10-05 DIAGNOSIS — I71.9 DESCENDING AORTIC ANEURYSM (HCC): Primary | ICD-10-CM

## 2017-10-05 DIAGNOSIS — I71.9 DESCENDING AORTIC ANEURYSM (HCC): ICD-10-CM

## 2017-10-05 DIAGNOSIS — I71.40 ABDOMINAL AORTIC ANEURYSM (AAA) WITHOUT RUPTURE: ICD-10-CM

## 2017-10-05 PROCEDURE — 74176 CT ABD & PELVIS W/O CONTRAST: CPT

## 2017-10-05 NOTE — TELEPHONE ENCOUNTER
Called and spoke with pt . CT scan stable from vascular standpoint. Can repeat scan in one year. Pt to call with questions or concerns.

## 2017-10-06 RX ORDER — SODIUM CHLORIDE 0.9 % (FLUSH) 0.9 %
5-10 SYRINGE (ML) INJECTION AS NEEDED
Status: CANCELLED | OUTPATIENT
Start: 2017-10-06

## 2017-10-06 RX ORDER — DIAZEPAM 5 MG/1
10 TABLET ORAL ONCE
Status: CANCELLED | OUTPATIENT
Start: 2017-10-16 | End: 2017-10-16

## 2017-10-10 ENCOUNTER — NURSE NAVIGATOR (OUTPATIENT)
Dept: OTHER | Age: 67
End: 2017-10-10

## 2017-10-10 NOTE — NURSE NAVIGATOR
Pt not scheduled for LDCT because she is already scheduled for a CT chest/abdomen to assess her aortic aneurysm. The results of the CT chest/abdomen will be shared with Dr. Gerson Coyne, Ms. Cifuentes's pulmonologist. Brooklyn Montalvo to Aleja Ryder at Dr. Tanesha Saleh office regarding LDCT.     Andreina Mehta, FRANCISN, RN, OCN  Lung Health Nurse Navigator

## 2017-10-16 ENCOUNTER — APPOINTMENT (OUTPATIENT)
Dept: GENERAL RADIOLOGY | Age: 67
End: 2017-10-16
Attending: PHYSICAL MEDICINE & REHABILITATION
Payer: MEDICARE

## 2017-10-16 ENCOUNTER — HOSPITAL ENCOUNTER (OUTPATIENT)
Age: 67
Setting detail: OUTPATIENT SURGERY
Discharge: HOME | End: 2017-10-16
Attending: PHYSICAL MEDICINE & REHABILITATION | Admitting: PHYSICAL MEDICINE & REHABILITATION
Payer: MEDICARE

## 2017-10-16 VITALS
DIASTOLIC BLOOD PRESSURE: 70 MMHG | HEIGHT: 63 IN | WEIGHT: 183 LBS | HEART RATE: 55 BPM | OXYGEN SATURATION: 92 % | TEMPERATURE: 97.9 F | BODY MASS INDEX: 32.43 KG/M2 | SYSTOLIC BLOOD PRESSURE: 131 MMHG | RESPIRATION RATE: 20 BRPM

## 2017-10-16 PROCEDURE — 76010000009 HC PAIN MGT 0 TO 30 MIN PROC: Performed by: PHYSICAL MEDICINE & REHABILITATION

## 2017-10-16 PROCEDURE — 74011250637 HC RX REV CODE- 250/637: Performed by: PHYSICAL MEDICINE & REHABILITATION

## 2017-10-16 PROCEDURE — 77030003672 HC NDL SPN HALY -A: Performed by: PHYSICAL MEDICINE & REHABILITATION

## 2017-10-16 PROCEDURE — 74011000250 HC RX REV CODE- 250

## 2017-10-16 PROCEDURE — 74011250636 HC RX REV CODE- 250/636

## 2017-10-16 RX ORDER — SODIUM CHLORIDE 0.9 % (FLUSH) 0.9 %
5-10 SYRINGE (ML) INJECTION AS NEEDED
Status: DISCONTINUED | OUTPATIENT
Start: 2017-10-16 | End: 2017-10-16 | Stop reason: HOSPADM

## 2017-10-16 RX ORDER — DIAZEPAM 5 MG/1
10 TABLET ORAL ONCE
Status: COMPLETED | OUTPATIENT
Start: 2017-10-16 | End: 2017-10-16

## 2017-10-16 RX ORDER — ROPIVACAINE HYDROCHLORIDE 2 MG/ML
INJECTION, SOLUTION EPIDURAL; INFILTRATION; PERINEURAL AS NEEDED
Status: DISCONTINUED | OUTPATIENT
Start: 2017-10-16 | End: 2017-10-16 | Stop reason: HOSPADM

## 2017-10-16 RX ORDER — LIDOCAINE HYDROCHLORIDE 10 MG/ML
INJECTION, SOLUTION EPIDURAL; INFILTRATION; INTRACAUDAL; PERINEURAL AS NEEDED
Status: DISCONTINUED | OUTPATIENT
Start: 2017-10-16 | End: 2017-10-16 | Stop reason: HOSPADM

## 2017-10-16 RX ADMIN — DIAZEPAM 10 MG: 5 TABLET ORAL at 10:16

## 2017-10-16 NOTE — PROCEDURES
[phone]    DIAGNOSTIC CERVICAL FACET INJECTION  PROCEDURE REPORT      PATIENT:  Adam Rosales  YOB: 1950  DATE OF SERVICE:  10/16/2017  [facility]    PRE-PROCEDURE DIAGNOSIS:  See Above    POST-PROCEDURE DIAGNOSIS:  See Above                PROCEDURE:  1. Bilateral diagnostic cervical medial branch blocks at  C4/C5, C5/C6, C6/C7,  nerves  (46567, 50;  13512, 50;  16553, 50  )  2. Fluoroscopic needle guidance (37690)      LEVELS TREATED:  Bilateral  C4/C5, C5/C6, C6/C7,  nerves    ANESTHESIA:  Local with oral sedation. See Medication Administration Record for specific medications and dosage. COMPLICATIONS: None. PHYSICIAN:  Lion Latham MD    PRE-PROCEDURE NOTE:  Pre-procedural assessment of the patient was performed including a limited history and physical examination. The details of the procedure were discussed with the patient, including the risks, benefits and alternative options and an informed consent was obtained. The patients NPO status, if necessary for the specific procedure and/or administration of moderate intravenous sedation, if utilized, and availability of a responsible adult to escort the patient following the procedure were confirmed. PROCEDURE NOTE:  The patient was brought to the procedure suite and positioned on the fluoroscopy table in the prone position. Physiologic monitors were applied and supplemental oxygen was administered via nasal cannula. The skin was prepped in the standard surgical fashion and sterile drapes were applied over the procedure site. Please refer to the Flowsheet for documentation of the patients vital signs and the Medication Administration Report for any oral and/or intravenous sedation administered prior to or during the procedure. 1% Lidocaine was utilized for local anesthesia.  Under AP fluoroscopic guidance a 25-gauge, 2-1/2 inch short bevel spinal needle was advanced from the posterior approach to the apex of the waist of the articular pillar at each of the above-listed levels. Each needle tip was rotated laterally and advanced slightly anteriorly 1-2 mm to lie alongside the corresponding medial branch nerve. After all needles were placed, 0.5 mL of ropivacaine 0.20% was injected at each location after the negative aspiration of blood, air or CSF. The needles were removed and the stilets were replaced. The procedure was performed on the contralateral side in the same fashion and at the same levels using the same volume of local anesthetic following negative aspiration of blood, air or CSF. The needles were removed intact. The area was thoroughly cleaned and sterile bandages applied as necessary. The patient tolerated the procedure well and vital signs remained stable throughout the procedure. The patient was assessed immediately following the procedure and was noted to have greater than 50% reduction in pain (a reduction from 7/10 to 2/10 in severity of cervical neck pain). Based on these results, the patient was considered an appropriate candidate for radiofrequency ablation of the above-mentioned medial branch nerves and will be scheduled for that procedure. The total levels successfully blocked were 3 levels,  both sides      POST-PROCEDURE COURSE:   The patient was escorted from the procedure suite in satisfactory condition and recovered per facility protocol based on the type of procedure performed and/or the sedation utilized. The patient did not experience any adverse events and remained hemodynamically stable during the post-procedure period. DISCHARGE NOTE:  Upon discharge, the patient was able to tolerate fluids and was in no acute distress. The patient was oriented to person, place and time and vital signs were stable.  Appropriate post-procedure instructions were provided and explained to the patient in detail and all questions were answered.     Bijal Santiago MD 10/16/2017 11:30 AM

## 2017-10-16 NOTE — DISCHARGE INSTRUCTIONS
[facility]      Post Procedures Instructions    *Resume Diet and Activity as tolerated. Rest for the remainder of the day. *You may fell worse before you feel better as the numbing medications wear off before the steroids take effect if used for your procedures. *Do not use affected extremity until numbness or loss of sensation has completely resolved without assistance. *DO NOT DRIVE, operate machinery/heavey equipment for 24 hours. *DO NOT DRINK ALCOHOL for 24 hours as it may interact with the sedation if you received it and also thins your blood and may cause you to bleed. *WAIT 24 hours before starting back ANY Blood thinning medications:   (Heparin, Coumadin, Warfarin, Lovenox, Plavix, Aggrenox)    *Resume Pre-Procedure Medications as prescribed except Blood Thinners unless directed by your Physician or Cardiologist.     *Avoid Hot tubs and Heating pad for 24 hours to prevent dissipation of medications, you may shower to remove bandages and remaining prep residue on the skin. * If you develop a Headache, drink plenty of fluids including beverages with caffeine (Coffee, Mt. Dew etc.) and rest.  If the headache persists longer than 24 hoursor intensifies - Please call [facility]) [phone]      * If you are DIABETIC, check your blood sugar three times a day for the next three days, the steroids will increase your blood sugar. If your blood sugar is greater than 400 have someone drive you to the nearest [address]. * If you experience any of the following problems, call the [facility] [phone] between 8:00 am - 4:30pm or After Hours [phone].     Shortness of breath    Fever of 101 F or higher    Nausea / Vomiting (not normal to you)    Increasing stiffness in the neck    Weakness or numbness in the arms or legs that is not resolving    Prolonged and increasing pain > than 4 days    ANYTHING OUT of the ORDINARY TO YOU    If YOU are experiencing a severe reaction / complication that you have never had before post procedure, call 911 or go to the nearest emergency room! All patients must have a  for transportation [place] regardless if you do or do not receive sedation. DISCHARGE SUMMARY from Nurse      PATIENT INSTRUCTIONS:    After Oral  or intravenous sedation, for 24 hours or while taking prescription Narcotics:  · Limit your activities  · Do not drive and operate hazardous machinery  · Do not make important personal or business decisions  · Do  not drink alcoholic beverages  · If you have not urinated within 8 hours after discharge, please contact your surgeon on call. Report the following to your surgeon:  · Excessive pain, swelling, redness or odor of or around the surgical area  · Temperature over 101  · Nausea and vomiting lasting longer than 4 hours or if unable to take medications  · Any signs of decreased circulation or nerve impairment to extremity: change in color, persistent  numbness, tingling, coldness or increase pain  · Any questions        What to do at Home:  Recommended activity: Activity as tolerated, NO DRIVING FOR 24 Hours post injection          *  Please give a list of your current medications to your Primary Care Provider. *  Please update this list whenever your medications are discontinued, doses are      changed, or new medications (including over-the-counter products) are added. *  Please carry medication information at all times in case of emergency situations. These are general instructions for a healthy lifestyle:    No smoking/ No tobacco products/ Avoid exposure to second hand smoke    Surgeon General's Warning:  Quitting smoking now greatly reduces serious risk to your health.     Obesity, smoking, and sedentary lifestyle greatly increases your risk for illness    A healthy diet, regular physical exercise & weight monitoring are important for maintaining a healthy lifestyle    You may be retaining fluid if you have a history of heart failure or if you experience any of the following symptoms:  Weight gain of 3 pounds or more overnight or 5 pounds in a week, increased swelling in our hands or feet or shortness of breath while lying flat in bed. Please call your doctor as soon as you notice any of these symptoms; do not wait until your next office visit. Recognize signs and symptoms of STROKE:    F-face looks uneven    A-arms unable to move or move unevenly    S-speech slurred or non-existent    T-time-call 911 as soon as signs and symptoms begin-DO NOT go       Back to bed or wait to see if you get better-TIME IS BRAIN. Calls[employer]s Activation    Thank you for requesting access to [employer]. Please follow the instructions below to securely access and download your online medical record. [employer]s allows you to send messages to your doctor, view your test results, renew your prescriptions, schedule appointments, and more. How Do I Sign Up? 1. In your internet browser, go to [email or web address]  2. Click on the First Time User? Click Here link in the Sign In box. You will be redirect to the New Member Sign Up page. 3. Enter your [employer] Access Code exactly as it appears below. You will not need to use this code after youve completed the sign-up process. If you do not sign up before the expiration date, you must request a new code. [employer] Access Code: Activation code not generated  Current [employer] Status: Patient Declined (This is the date your [employer] access code will )    4. Enter the last four digits of your Social Security Number (xxxx) and Date of Birth (mm/dd/yyyy) as indicated and click Submit. You will be taken to the next sign-up page. 5. Create a [employer] ID. This will be your [employer] login ID and cannot be changed, so think of one that is secure and easy to remember.   6. Create a [employer] password. You can change your password at any time. 7. Enter your Password Reset Question and Answer. This can be used at a later time if you forget your password. 8. Enter your e-mail address. You will receive e-mail notification when new information is available in [address] 9. Click Sign Up. You can now view and download portions of your medical record. 10. Click the Download Summary menu link to download a portable copy of your medical information. Additional Information    If you have questions, please visit the Frequently Asked Questions section of the [employer] website at [email or web address][email or web address] Remember, [employer] is NOT to be used for urgent needs. For medical emergencies, dial 911.

## 2017-10-16 NOTE — INTERVAL H&P NOTE
H&P Update:  Marline Núñez was seen and examined. History and physical has been reviewed. The patient has been examined.  There have been no significant clinical changes since the completion of the originally dated History and Physical.    Signed By: Viviana Sy MD     October 16, 2017 9:44 AM

## 2017-10-17 ENCOUNTER — TELEPHONE (OUTPATIENT)
Dept: PAIN MANAGEMENT | Age: 67
End: 2017-10-17

## 2017-10-17 NOTE — TELEPHONE ENCOUNTER
Ms. Brodie Diamond was contacted for follow-up status post Bilateral diagnostic cervical medial branch blocks at  C4/C5, C5/C6, C6/C7,  nerves  on October 16, 2017.  She reports:    Pre-procedure numerical pain score: 7/10  Post-procedure numerical pain score one week after: 1/10  Duration of relief post-procedure (if applicable): 8 hrs  Improvement in functional activities (if applicable): Yes  Percentage of overall improvement: 95%    COMMENTS:

## 2017-11-01 RX ORDER — DIAZEPAM 5 MG/1
10 TABLET ORAL ONCE
Status: CANCELLED | OUTPATIENT
Start: 2017-11-07 | End: 2017-11-07

## 2017-11-01 RX ORDER — SODIUM CHLORIDE 0.9 % (FLUSH) 0.9 %
5-10 SYRINGE (ML) INJECTION AS NEEDED
Status: CANCELLED | OUTPATIENT
Start: 2017-11-01

## 2017-11-10 RX ORDER — SODIUM CHLORIDE 0.9 % (FLUSH) 0.9 %
5-10 SYRINGE (ML) INJECTION AS NEEDED
Status: CANCELLED | OUTPATIENT
Start: 2017-11-10

## 2017-11-10 RX ORDER — DIAZEPAM 5 MG/1
10 TABLET ORAL ONCE
Status: CANCELLED | OUTPATIENT
Start: 2017-11-14 | End: 2017-11-14

## 2017-11-14 ENCOUNTER — APPOINTMENT (OUTPATIENT)
Dept: GENERAL RADIOLOGY | Age: 67
End: 2017-11-14
Attending: PHYSICAL MEDICINE & REHABILITATION
Payer: MEDICARE

## 2017-11-14 ENCOUNTER — HOSPITAL ENCOUNTER (OUTPATIENT)
Age: 67
Setting detail: OUTPATIENT SURGERY
Discharge: HOME | End: 2017-11-14
Attending: PHYSICAL MEDICINE & REHABILITATION | Admitting: PHYSICAL MEDICINE & REHABILITATION
Payer: MEDICARE

## 2017-11-14 VITALS
TEMPERATURE: 98 F | BODY MASS INDEX: 32.43 KG/M2 | HEART RATE: 51 BPM | RESPIRATION RATE: 18 BRPM | DIASTOLIC BLOOD PRESSURE: 72 MMHG | SYSTOLIC BLOOD PRESSURE: 133 MMHG | WEIGHT: 183 LBS | OXYGEN SATURATION: 93 % | HEIGHT: 63 IN

## 2017-11-14 PROCEDURE — 74011250636 HC RX REV CODE- 250/636: Performed by: PHYSICAL MEDICINE & REHABILITATION

## 2017-11-14 PROCEDURE — 76010000009 HC PAIN MGT 0 TO 30 MIN PROC: Performed by: PHYSICAL MEDICINE & REHABILITATION

## 2017-11-14 PROCEDURE — 77030003672 HC NDL SPN HALY -A: Performed by: PHYSICAL MEDICINE & REHABILITATION

## 2017-11-14 PROCEDURE — 74011000250 HC RX REV CODE- 250

## 2017-11-14 PROCEDURE — 74011250636 HC RX REV CODE- 250/636

## 2017-11-14 PROCEDURE — 74011000250 HC RX REV CODE- 250: Performed by: PHYSICAL MEDICINE & REHABILITATION

## 2017-11-14 PROCEDURE — 74011250637 HC RX REV CODE- 250/637: Performed by: PHYSICAL MEDICINE & REHABILITATION

## 2017-11-14 RX ORDER — DIAZEPAM 5 MG/1
10 TABLET ORAL ONCE
Status: COMPLETED | OUTPATIENT
Start: 2017-11-14 | End: 2017-11-14

## 2017-11-14 RX ORDER — SODIUM CHLORIDE 0.9 % (FLUSH) 0.9 %
5-10 SYRINGE (ML) INJECTION AS NEEDED
Status: DISCONTINUED | OUTPATIENT
Start: 2017-11-14 | End: 2017-11-14 | Stop reason: HOSPADM

## 2017-11-14 RX ORDER — LIDOCAINE HYDROCHLORIDE 10 MG/ML
INJECTION, SOLUTION EPIDURAL; INFILTRATION; INTRACAUDAL; PERINEURAL AS NEEDED
Status: DISCONTINUED | OUTPATIENT
Start: 2017-11-14 | End: 2017-11-14 | Stop reason: HOSPADM

## 2017-11-14 RX ORDER — ROPIVACAINE HYDROCHLORIDE 2 MG/ML
INJECTION, SOLUTION EPIDURAL; INFILTRATION; PERINEURAL AS NEEDED
Status: DISCONTINUED | OUTPATIENT
Start: 2017-11-14 | End: 2017-11-14 | Stop reason: HOSPADM

## 2017-11-14 RX ORDER — DIAZEPAM 5 MG/1
10 TABLET ORAL ONCE
Status: DISCONTINUED | OUTPATIENT
Start: 2017-11-14 | End: 2017-11-14 | Stop reason: HOSPADM

## 2017-11-14 RX ADMIN — DIAZEPAM 10 MG: 5 TABLET ORAL at 09:40

## 2017-11-14 NOTE — PROCEDURES
[id number]    DIAGNOSTIC CERVICAL FACET INJECTION  PROCEDURE REPORT      PATIENT:  Manuel Soares  YOB: 1950  DATE OF SERVICE:  11/14/2017  [facility]    PRE-PROCEDURE DIAGNOSIS:  See Above    POST-PROCEDURE DIAGNOSIS:  See Above                PROCEDURE:  1. Bilateral diagnostic cervical medial branch blocks at  C4/C5, C5/C6, C6/C7,  nerves  (72464, 50;  90317, 50;  58567, 50  )  2. Fluoroscopic needle guidance (81182)      LEVELS TREATED:  Bilateral  C4/C5, C5/C6, C6/C7,  nerves    ANESTHESIA:  Local with oral sedation. See Medication Administration Record for specific medications and dosage. COMPLICATIONS: None. PHYSICIAN:  Sade Spears MD    PRE-PROCEDURE NOTE:  Pre-procedural assessment of the patient was performed including a limited history and physical examination. The details of the procedure were discussed with the patient, including the risks, benefits and alternative options and an informed consent was obtained. The patients NPO status, if necessary for the specific procedure and/or administration of moderate intravenous sedation, if utilized, and availability of a responsible adult to escort the patient following the procedure were confirmed. PROCEDURE NOTE:  The patient was brought to the procedure suite and positioned on the fluoroscopy table in the prone position. Physiologic monitors were applied and supplemental oxygen was administered via nasal cannula. The skin was prepped in the standard surgical fashion and sterile drapes were applied over the procedure site. Please refer to the Flowsheet for documentation of the patients vital signs and the Medication Administration Report for any oral and/or intravenous sedation administered prior to or during the procedure. 1% Lidocaine was utilized for local anesthesia.  Under AP fluoroscopic guidance a 25-gauge, 2-1/2 inch short bevel spinal needle was advanced from the posterior approach to the apex of the waist of the articular pillar at each of the above-listed levels. Each needle tip was rotated laterally and advanced slightly anteriorly 1-2 mm to lie alongside the corresponding medial branch nerve. After all needles were placed, 0.5 mL of ropivacaine 0.20% was injected at each location after the negative aspiration of blood, air or CSF. The needles were removed and the stilets were replaced. The procedure was performed on the contralateral side in the same fashion and at the same levels using the same volume of local anesthetic following negative aspiration of blood, air or CSF. The needles were removed intact. The area was thoroughly cleaned and sterile bandages applied as necessary. The patient tolerated the procedure well and vital signs remained stable throughout the procedure. The patient was assessed immediately following the procedure and was noted to have greater than 50% reduction in pain (a reduction from 8/10 to 2/10 in severity of cervical neck pain). Based on these results, the patient was considered an appropriate candidate for radiofrequency ablation of the above-mentioned medial branch nerves and will be scheduled for that procedure. The total levels successfully blocked were 3 levels,  both sides      POST-PROCEDURE COURSE:   The patient was escorted from the procedure suite in satisfactory condition and recovered per facility protocol based on the type of procedure performed and/or the sedation utilized. The patient did not experience any adverse events and remained hemodynamically stable during the post-procedure period. DISCHARGE NOTE:  Upon discharge, the patient was able to tolerate fluids and was in no acute distress. The patient was oriented to person, place and time and vital signs were stable.  Appropriate post-procedure instructions were provided and explained to the patient in detail and all questions were answered.     Angela To MD 11/14/2017 10:28 AM

## 2017-11-14 NOTE — DISCHARGE INSTRUCTIONS
[address] [place] [address] for Pain Management      Post Procedures Instructions    *Resume Diet and Activity as tolerated. Rest for the remainder of the day. *You may fell worse before you feel better as the numbing medications wear off before the steroids take effect if used for your procedures. *Do not use affected extremity until numbness or loss of sensation has completely resolved without assistance. *DO NOT DRIVE, operate machinery/heavey equipment for 24 hours. *DO NOT DRINK ALCOHOL for 24 hours as it may interact with the sedation if you received it and also thins your blood and may cause you to bleed. *WAIT 24 hours before starting back ANY Blood thinning medications:   (Heparin, Coumadin, Warfarin, Lovenox, Plavix, Aggrenox)    *Resume Pre-Procedure Medications as prescribed except Blood Thinners unless directed by your Physician or Cardiologist.     *Avoid Hot tubs and Heating pad for 24 hours to prevent dissipation of medications, you may shower to remove bandages and remaining prep residue on the skin. * If you develop a Headache, drink plenty of fluids including beverages with caffeine (Coffee, Mt. Dew etc.) and rest.  If the headache persists longer than 24 hoursor intensifies - Please call [facility] ([facility]) [phone]    * If you are DIABETIC, check your blood sugar three times a day for the next three days, the steroids will increase your blood sugar. If your blood sugar is greater than 400 have someone drive you to the nearest [address]. * If you experience any of the following problems, call the [facility] [phone] between 8:00 am - 4:30pm or After Hours [phone].     Shortness of breath    Fever of 101 F or higher    Nausea / Vomiting (not normal to you)    Increasing stiffness in the neck    Weakness or numbness in the arms or legs that is not resolving    Prolonged and increasing pain > than 4 days    ANYTHING OUT of the ORDINARY TO YOU    If YOU are experiencing a severe reaction / complication that you have never had before post procedure, call 911 or go to the nearest emergency room! All patients must have a  for transportation [place] regardless if you do or do not receive sedation. DISCHARGE SUMMARY from Nurse      PATIENT INSTRUCTIONS:    After Oral  or intravenous sedation, for 24 hours or while taking prescription Narcotics:  · Limit your activities  · Do not drive and operate hazardous machinery  · Do not make important personal or business decisions  · Do  not drink alcoholic beverages  · If you have not urinated within 8 hours after discharge, please contact your surgeon on call. Report the following to your surgeon:  · Excessive pain, swelling, redness or odor of or around the surgical area  · Temperature over 101  · Nausea and vomiting lasting longer than 4 hours or if unable to take medications  · Any signs of decreased circulation or nerve impairment to extremity: change in color, persistent  numbness, tingling, coldness or increase pain  · Any questions        What to do at Home:  Recommended activity: Activity as tolerated, NO DRIVING FOR 24 Hours post injection          *  Please give a list of your current medications to your Primary Care Provider. *  Please update this list whenever your medications are discontinued, doses are      changed, or new medications (including over-the-counter products) are added. *  Please carry medication information at all times in case of emergency situations. These are general instructions for a healthy lifestyle:    No smoking/ No tobacco products/ Avoid exposure to second hand smoke    Surgeon General's Warning:  Quitting smoking now greatly reduces serious risk to your health.     Obesity, smoking, and sedentary lifestyle greatly increases your risk for illness    A healthy diet, regular physical exercise & weight monitoring are important for maintaining a healthy lifestyle    You may be retaining fluid if you have a history of heart failure or if you experience any of the following symptoms:  Weight gain of 3 pounds or more overnight or 5 pounds in a week, increased swelling in our hands or feet or shortness of breath while lying flat in bed. Please call your doctor as soon as you notice any of these symptoms; do not wait until your next office visit. Recognize signs and symptoms of STROKE:    F-face looks uneven    A-arms unable to move or move unevenly    S-speech slurred or non-existent    T-time-call 911 as soon as signs and symptoms begin-DO NOT go       Back to bed or wait to see if you get better-TIME IS BRAIN.

## 2017-11-14 NOTE — H&P
14 Nov 2017, 9:05AM.  Patient seen and examined prior to procedure. Chart and data reviewed. No significant interval changes from previous evaluation noted. Stable for procedure as intended and discussed.  [facility]

## 2017-11-15 ENCOUNTER — TELEPHONE (OUTPATIENT)
Dept: PAIN MANAGEMENT | Age: 67
End: 2017-11-15

## 2017-11-15 NOTE — TELEPHONE ENCOUNTER
Ms. Carson Deluca was contacted for follow-up status post Bilateral diagnostic cervical medial branch blocks at  C4/C5, C5/C6, C6/C7,  nerves on November 14, 2017.  She reports:    Pre-procedure numerical pain score: 5/10  Post-procedure numerical pain score one week after: 0/10  Duration of relief post-procedure (if applicable): 7.5 hrs  Improvement in functional activities (if applicable): Yes  Percentage of overall improvement: 100%    COMMENTS:

## 2017-11-27 ENCOUNTER — DOCUMENTATION ONLY (OUTPATIENT)
Dept: PULMONOLOGY | Age: 67
End: 2017-11-27

## 2017-11-27 NOTE — PROGRESS NOTES
Per Care Coordinator, Valerie Aguilar, patient states she does not wish to have low dose CT chest, lung cancer screening, done at this time. Dr. Nadiya Person has been made aware.

## 2018-03-15 ENCOUNTER — OFFICE VISIT (OUTPATIENT)
Dept: PULMONOLOGY | Age: 68
End: 2018-03-15

## 2018-03-15 VITALS
DIASTOLIC BLOOD PRESSURE: 70 MMHG | WEIGHT: 182 LBS | OXYGEN SATURATION: 93 % | HEART RATE: 69 BPM | SYSTOLIC BLOOD PRESSURE: 130 MMHG | RESPIRATION RATE: 22 BRPM | TEMPERATURE: 98.3 F | BODY MASS INDEX: 32.25 KG/M2 | HEIGHT: 63 IN

## 2018-03-15 DIAGNOSIS — J44.9 BRONCHITIS WITH CHRONIC AIRWAY OBSTRUCTION (HCC): ICD-10-CM

## 2018-03-15 DIAGNOSIS — R91.1 INCIDENTAL LUNG NODULE, > 3MM AND < 8MM: ICD-10-CM

## 2018-03-15 DIAGNOSIS — J44.9 CHRONIC OBSTRUCTIVE PULMONARY DISEASE, UNSPECIFIED COPD TYPE (HCC): ICD-10-CM

## 2018-03-15 DIAGNOSIS — G47.34 NOCTURNAL HYPOXEMIA: Primary | ICD-10-CM

## 2018-03-15 DIAGNOSIS — I71.9 DESCENDING AORTIC ANEURYSM (HCC): ICD-10-CM

## 2018-03-15 NOTE — PROGRESS NOTES
[facility]  Pulmonary, Critical Care, and Sleep Medicine      Pulmonary Office visit. Name: Cal Quinonez     : 1950     Date: 3/15/2018        Subjective:     Patient is a 75 y.o. female is here for follow up- evaluation of an abnormal Ct scan of chest.    03/15/18   Patient feels well and denies any cough ,chest tightness, wheezing. She had SOB with activity but no PND or orthopnea. No fever or chills. Using night time O2  On Singulair, flonase. She has not had follow up Ct scan yet. She also has h/o allergies and is receiving allergy immunotherapy by Dr. Adrian Ardon. HPI:  Followed from 2014 To 10/2014. Most recent follow up Ct scan 2015 with stability in nodule. She had 24 month follow up CT completed and is here to discuss results. She also had follow up Sleep evaluation- Dr. Krunal Askew who reports nocturnal hypoxemia on initial study and follow up titration study. Patient was asked to follow up with Pulmonology. She has smoked less than 1 ppd for past 47+ years and eventually quit smoking in   She denies any sleep related problems, leg swelling. Denies joint pains. 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 H/o rheumatic fever in childhood.      Past Medical History:   Diagnosis Date    Arthritis     Chronic lung disease     Chronic obstructive pulmonary disease ([address]     Descending aortic aneurysm ([address]     Fibromyalgia     Hypercholesterolemia     Hypertension     Ill-defined condition     Sleep apnea     mouth guard, oxygen HS       Past Surgical History:   Procedure Laterality Date    COLONOSCOPY N/A 7/3/2017    COLONOSCOPY with biopsies performed by Rodrigo Fowler MD at [address] HX APPENDECTOMY      HX CATARACT REMOVAL      HX TUBAL LIGATION       Allergies   Allergen Reactions    Benadryl [Diphenhydramine Hcl] Hives    Codeine Itching    Cymbalta [Duloxetine] Other (comments)     Shaking, feeling someone living inside my body, arms going numb.  Iodinated Contrast- Oral And Iv Dye Hives     Pt states severe hives     Levaquin [Levofloxacin] Nausea Only    Lyrica [Pregabalin] Other (comments)     Shaking, feeling someone living in my body, numb arm. Any meds for fibromyialga    Other Medication Swelling     Pt wrote having an allergic reaction to \"tetnus\". \"Extreme swelling at site\"     Sulfur Hives     Current Outpatient Prescriptions   Medication Sig Dispense Refill    OXYGEN-AIR DELIVERY SYSTEMS 2 L/min by Does Not Apply route nightly.  Flaxseed Oil oil by Does Not Apply route.  ergocalciferol (ERGOCALCIFEROL) 50,000 unit capsule Indications: vitamin D2  0    NIFEdipine ER (PROCARDIA XL) 30 mg ER tablet Take 30 mg by mouth daily.  atenolol (TENORMIN) 50 mg tablet Take  by mouth daily.  losartan (COZAAR) 100 mg tablet Take 100 mg by mouth nightly.  atorvastatin (LIPITOR) 40 mg tablet Take  by mouth daily.  pantoprazole (PROTONIX) 40 mg tablet Take 40 mg by mouth two (2) times a day.  zolpidem (AMBIEN) 10 mg tablet Take 5 mg by mouth nightly as needed for Sleep.  levocetirizine (XYZAL) 5 mg tablet Take  by mouth daily.  fluticasone (FLONASE) 50 mcg/actuation nasal spray two (2) times daily as needed.  montelukast (SINGULAIR) 10 mg tablet Take 10 mg by mouth nightly.  multivitamin (ONE A DAY) tablet Take 1 Tab by mouth daily.  calcium-cholecalciferol, d3, (CALCIUM 600 + D) 600-125 mg-unit tab Take  by mouth daily.  aspirin delayed-release 81 mg tablet Take  by mouth every other day.  VITAMIN B COMPLEX (B COMPLETE PO) Take  by mouth daily.  MAGNESIUM CARBONATE PO Take  by mouth daily.  potassium 99 mg tablet Take 45.5 mg by mouth daily.        Review of Systems:  HEENT: No epistaxis, no nasal drainage, no difficulty in swallowing, no redness in eyes  Respiratory: as above  Cardiovascular: no chest pain, no palpitations, no chronic leg edema, no syncope  Gastrointestinal: no abd pain, no vomiting, no diarrhea, no bleeding symptoms  Genitourinary: No urinary symptoms or hematuria  Integument/breast: No ulcers or rashes  Musculoskeletal:Neg  Neurological: No focal weakness, no seizures, no headaches  Behvioral/Psych: No anxiety, no depression  Constitutional: No fever, no chills, no weight loss, no night sweats     Objective:     Visit Vitals    /70 (BP 1 Location: Left arm, BP Patient Position: At rest)    Pulse 69    Temp 98.3 °F (36.8 °C) (Oral)    Resp 22    Ht 5' 3\" (1.6 m)    Wt 82.6 kg (182 lb)    SpO2 93%    BMI 32.24 kg/m2        Physical Exam:   General: comfortable, no acute distress  HEENT: pupils reactive, sclera anicteric, EOM intact  Neck: No adenopathy or thyroid swelling, no lymphadenopathy or JVD, supple  CVS: S1S2 no murmurs  RS: Mod AE bilaterally, no tactile fremitus or egophony, no accessory muscle use  Abd: soft, non tender, no hepatosplenomegaly  Neuro: non focal, awake, alert  Extrm: no leg edema, clubbing or cyanosis  Skin: no rash    Data review:   PFT's:  Flows:  Maximal Mid Expiratory Flow rate is reduced to 48 % predicted  Forced Expiratory Volume in one second is normal  FEV 1% is reduced  Volumes:  Normal Volumes  Flow Volume Loop:  Reduced Terminal Flows in the Flow Volume Loop  Bronchodilator:  Significant improvement with bronchodilator  Diffusion:  Low Normal Diffusion Capacity  Impression:  Mild to Moderate obstructive defect, predominately small airways    Imaging:  I have personally reviewed the patients radiographs and have reviewed the reports:  CT scan 10/2017:  6 mm right upper lobe nodule against the minor fissure is stable over the  last 3 years, benign    CT scan of chest ( [facility]-) 5/2016. LUNGS:  Nodules:  -5 mm perifissural nodule along the right minor fissure (image 131), solid circumscribed, likely an intrapulmonary lymph node.   -5 mm groundglass nodule right lung apex (image 56).  Other pulmonary findings:  Patchy coarse reticular opacification in the mid and lower lungs, scarring and/or subsegmental atelectasis. Few regions of mild cylindrical bronchiectasis. OTHER:   No significant incidental findings. CT scan 7/2015:  Stable pulmonary nodule along the minor fissure and stable borderline  precarinal mediastinal lymph node since 9/2014. Ct scan of chest:6/2014:C  There is a 0.5 x 0.4 cm noncalcified ovoid density in the right upper lobe   inferior aspect abutting the minor fissure (image #32). No dominant lung mass   is detected. Subsegmental atelectatic changes are identified in the right   middle lobe and the right lower lobe. Less pronounced subtle subsegmental   atelectatic changes also identified in the lingular region and left lung base. No focal infiltrate or consolidation is observed. Ct scan of abd/chest- 9/2014;  1. Stable right pulmonary nodule. 2. The descending thoracic aortic aneurysm is stable. Ectasia of the ascending   aorta is stable. 3. No evidence for bowel obstruction or inflammation. Inspissated stool in the   descending colon could be the result of constipation. 4. 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 2-D echo:  Left ventricle: Size was normal. Systolic function was normal by EF  (biplane method of disks). Ejection fraction was estimated to be 55 %. There were no regional wall motion abnormalities. Wall thickness was  normal. Doppler parameters were consistent with abnormal left ventricular  relaxation (grade 1 diastolic dysfunction). Right ventricle: Systolic pressure was mildly increased. Estimated peak  pressure was 35 mmHg. Left atrium: The atrium was mildly dilated. Mitral valve: There was mild annular calcification. Tricuspid valve: There was trace to mild regurgitation. Pulmonary arteries: Systolic pressure was mildly increased.       IMPRESSION:   Lung nodule- incidental finding of 0.5x0.4 cm RUL nodule in a patient who has been a smoker 52 PPD. High risk for malignancy and needs appropriate follow up. 24 month stability demonstrated. Discussed report with patient. Need to follow up annually per USTFP recommendations- low dose lung CT screening protocol or can accept Ct done for evaluation of Aneurysm. 6 mm right upper lobe nodule against the minor fissure is stable over the last 3 years, benign  · Bronchitis- acute on chronic with bronchospasm resolved exacerbation and stable at baseline state- Asthma and/or COPD and obstructive features, also with some radiologic findings consistent with chronic atelectasis- asymptomatic  · Mild pulmonary HTN  · Allergic rhinitis controlled on Flonase and allergy injections  · Esophageal stricture- s/p dilatation  And h/o hiatal hernia ? 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 Now yearly LDCT   Continue Nocturnal oxygen supplementation based on documented Oxygen desaturation during sleep despite LESLIE corrective devices.  Will repeat qualifying study  Continue seasonal LABA-ICS and prn JOBY  Continue prn albuterol  Antireflux therapy and continued interventions- dietary and medications  Maintain smoking cessation- congratulated on success  Preventive vaccinations  Will follow up   Questions and concerns addressed          Gopi Haas MD

## 2018-03-15 NOTE — PROGRESS NOTES
[facility]    [address] [place] [address]      SIMPLE PULMONARY STRESS TEST - 6 MINUTE WALK    PATIENT NAME: Natan Lee    DATE: 3/15/2018     YOB: 1950     AGE: 75 y.o. DIAGNOSIS:   Encounter Diagnosis   Name Primary?  Chronic obstructive pulmonary disease, unspecified COPD type ([facility]          TECHNICIAN: Doni Pérez, RT         PHYSICIAN: Dr Ellen Espinosa MD      Visit Vitals    /70 (BP 1 Location: Left arm, BP Patient Position: At rest)    Pulse 69    Temp 98.3 °F (36.8 °C) (Oral)    Resp 22    Ht 5' 3\" (1.6 m)    Wt 182 lb (82.6 kg)    SpO2 93%    BMI 32.24 kg/m2        RESTING DATA:  Dyspnea Scale (1-10):    3 SOB    EXERCISE DATA:   6 MINUTE WALK - HALLWAY (34 METERS)      1   RA    93 % SAT   87 HR   4 SOB    2 Laps x 34m = 68m  2   RA    92 % SAT   89 HR   4 SOB    2 Laps x 34m = 68m  3   RA    92 % SAT   90 HR   5 SOB    2 Laps x 34m = 68m  4   RA    92 % SAT   91 HR   5 SOB    2 Laps x 34m = 68m  5   RA    92 % SAT   92 HR   5 SOB    2 Laps x 34m = 68m  6   RA    91 % SAT   91 HR   5 SOB    2 Laps x 34m = 68m    TOTAL DISTANCE:   408 M    RECOVERY DATA:      1   RA    92 % SAT   88 HR   23 RR      4 SOB  2   RA    94 % SAT   68 HR   22 RR      3 SOB      TECHNICIAN COMMENTS: Patient tolerated 6 minute walk well. Patient's 02 saturation level decreased on room air to a low of 91% while walking while Heart Rate increased to a high of 92 bpm. Patient did not appear to show signs of distress while walking.  During resting 02 Saturation level returned to 94% within 2 minutes  And Heart Rate decreased to 68 bpm. Patient's work of breathing also returned to pre testing levels within 2 minutes of rest.

## 2018-03-15 NOTE — PROGRESS NOTES
Ms. Paschal Kanner has a reminder for a \"due or due soon\" health maintenance. I have asked that she contact her primary care provider for follow-up on this health maintenance. Chief Complaint   Patient presents with    COPD     1. Have you been to the ER, urgent care clinic since your last visit? Hospitalized since your last visit? No    2. Have you seen or consulted any other health care providers outside of the [address] [place] [address] since your last visit? Include any pap smears or colon screening.  No

## 2018-03-16 ENCOUNTER — CLINICAL SUPPORT (OUTPATIENT)
Dept: PULMONOLOGY | Age: 68
End: 2018-03-16

## 2018-03-16 DIAGNOSIS — J44.9 CHRONIC OBSTRUCTIVE PULMONARY DISEASE, UNSPECIFIED COPD TYPE (HCC): ICD-10-CM

## 2018-03-16 DIAGNOSIS — G47.34 NOCTURNAL HYPOXEMIA: Primary | ICD-10-CM

## 2018-03-16 LAB
O2 AMOUNT, POCO2: NORMAL
POC PULSE OXIMETRY, POCSPO2: NORMAL %

## 2018-03-16 NOTE — PROGRESS NOTES
Patient returning overnight pulse oximeter. Per Dr. Gee Renner verbal order with read back, order placed for overnight pulse oximetry on room air.

## 2018-04-04 ENCOUNTER — TELEPHONE (OUTPATIENT)
Dept: PULMONOLOGY | Age: 68
End: 2018-04-04

## 2018-04-04 DIAGNOSIS — G47.34 NOCTURNAL HYPOXEMIA: Primary | ICD-10-CM

## 2018-04-04 NOTE — TELEPHONE ENCOUNTER
Joseph Tracy with [employer] needs an updated oxygen order and overnight testing to re-qualify pt with Medicare now.

## 2018-04-04 NOTE — TELEPHONE ENCOUNTER
Per Demar Chen from [employer] [facility] they need a new O2 order and an order for overnight testing to be done.

## 2018-04-04 NOTE — TELEPHONE ENCOUNTER
Order completed and signed by Jerod Keller NP.  Order and overnight faxed to [facility] for re-qualificaiton

## 2018-04-12 ENCOUNTER — TELEPHONE (OUTPATIENT)
Dept: PULMONOLOGY | Age: 68
End: 2018-04-12

## 2018-04-12 NOTE — TELEPHONE ENCOUNTER
This request was already address on 4/4/18 at first call from [facility] with Dr. Franchesca Doyle out of office.  Mara BELTRAN already did an new order and over night sent to [facility]

## 2018-04-12 NOTE — TELEPHONE ENCOUNTER
Remi Mathew from [employer] called because the paperwork for her o2 expires tomorrow and she would like to know if anyone else can sign it today so that the pt does not lose her oxygen.  Please call [phone]

## 2018-04-27 ENCOUNTER — TELEPHONE (OUTPATIENT)
Dept: PULMONOLOGY | Age: 68
End: 2018-04-27

## 2018-04-27 NOTE — TELEPHONE ENCOUNTER
Kirt Wilkinson from [employer] called because they did an overnight for patient to be re qualified for her oxygen. She said that the pt has sleep apnea and that medicare will not pay for her o2 until she has a sleep titration done. She asked if we would be calling the pt to set this appointment up.

## 2018-05-01 ENCOUNTER — HOSPITAL ENCOUNTER (OUTPATIENT)
Dept: MAMMOGRAPHY | Age: 68
Discharge: HOME | End: 2018-05-01
Attending: FAMILY MEDICINE
Payer: MEDICARE

## 2018-05-01 DIAGNOSIS — Z12.31 VISIT FOR SCREENING MAMMOGRAM: ICD-10-CM

## 2018-05-01 DIAGNOSIS — G47.33 OSA (OBSTRUCTIVE SLEEP APNEA): ICD-10-CM

## 2018-05-01 DIAGNOSIS — G47.34 NOCTURNAL HYPOXEMIA: Primary | ICD-10-CM

## 2018-05-01 PROCEDURE — 77063 BREAST TOMOSYNTHESIS BI: CPT

## 2018-05-02 NOTE — TELEPHONE ENCOUNTER
Called and notified patient that Dr. Rowena Wright has ordered a titration study. Due to having a dx of sleep apnea patient will need to have the titration study done before medicare will pay for her night time oxygen. Provided patient with contact info for [employer].

## 2018-06-01 ENCOUNTER — HOSPITAL ENCOUNTER (OUTPATIENT)
Dept: BONE DENSITY | Age: 68
Discharge: HOME | End: 2018-06-01
Attending: FAMILY MEDICINE
Payer: MEDICARE

## 2018-06-01 DIAGNOSIS — Z78.0 MENOPAUSE: ICD-10-CM

## 2018-06-01 PROCEDURE — 77080 DXA BONE DENSITY AXIAL: CPT

## 2018-06-08 ENCOUNTER — HOSPITAL ENCOUNTER (OUTPATIENT)
Dept: SLEEP MEDICINE | Age: 68
Discharge: HOME | End: 2018-06-08
Payer: MEDICARE

## 2018-06-08 DIAGNOSIS — G47.34 NOCTURNAL HYPOXEMIA: ICD-10-CM

## 2018-06-08 DIAGNOSIS — G47.33 OSA (OBSTRUCTIVE SLEEP APNEA): ICD-10-CM

## 2018-06-08 PROCEDURE — 95811 POLYSOM 6/>YRS CPAP 4/> PARM: CPT

## 2018-06-13 ENCOUNTER — TELEPHONE (OUTPATIENT)
Dept: PULMONOLOGY | Age: 68
End: 2018-06-13

## 2018-06-13 NOTE — TELEPHONE ENCOUNTER
Patient had titration study done 6/08/2018, and has a f2f with Dr. Niranjan Murphy on 6/14/18. Called robyn Jinmail requesting return call.

## 2018-06-14 ENCOUNTER — OFFICE VISIT (OUTPATIENT)
Dept: PULMONOLOGY | Age: 68
End: 2018-06-14

## 2018-06-14 VITALS
HEIGHT: 63 IN | OXYGEN SATURATION: 94 % | HEART RATE: 64 BPM | SYSTOLIC BLOOD PRESSURE: 102 MMHG | RESPIRATION RATE: 16 BRPM | DIASTOLIC BLOOD PRESSURE: 58 MMHG | TEMPERATURE: 98.7 F | BODY MASS INDEX: 31.89 KG/M2 | WEIGHT: 180 LBS

## 2018-06-14 DIAGNOSIS — J44.9 BRONCHITIS WITH CHRONIC AIRWAY OBSTRUCTION (HCC): ICD-10-CM

## 2018-06-14 DIAGNOSIS — R91.1 INCIDENTAL LUNG NODULE, > 3MM AND < 8MM: ICD-10-CM

## 2018-06-14 DIAGNOSIS — I27.29 NOCTURNAL HYPOXEMIA DUE TO PULMONARY HYPERTENSION (HCC): Primary | ICD-10-CM

## 2018-06-14 DIAGNOSIS — G47.36 NOCTURNAL HYPOXEMIA DUE TO PULMONARY HYPERTENSION (HCC): Primary | ICD-10-CM

## 2018-06-14 NOTE — PROGRESS NOTES
[facility]  Pulmonary, Critical Care, and Sleep Medicine      Pulmonary Office visit. Name: Dora Lundborg     : 1950     Date: 2018        Subjective:     Patient is a 75 y.o. female is here for follow up- completed Sleep study and evaluation for need for nocturnal Oxygen supplementation. evaluation of an abnormal Ct scan of chest.    18   Patient feels well and denies any cough ,chest tightness, wheezing. She had SOB with activity but no PND or orthopnea. No fever or chills. Using night time O2  Wears oral piece for Sleep apnea  Had titration study completed- results suggest nocturnal Oxygen desaturation despite CPAP 9 cm. Needs 2 L Oxygen in addition. On Singulair, flonase. She has not had follow up Ct scan yet. She also has h/o allergies and is receiving allergy immunotherapy by Dr. Deb Abarca. HPI:  Followed from 2014 To 10/2014. Most recent follow up Ct scan 2015 with stability in nodule. She had 24 month follow up CT completed and is here to discuss results. She also had follow up Sleep evaluation- Dr. Nae Martinez who reports nocturnal hypoxemia on initial study and follow up titration study. Patient was asked to follow up with Pulmonology. She has smoked less than 1 ppd for past 47+ years and eventually quit smoking in   She denies any sleep related problems, leg swelling. Denies joint pains. 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 H/o rheumatic fever in childhood.      Past Medical History:   Diagnosis Date    Arthritis     Chronic lung disease     Chronic obstructive pulmonary disease ([address]     Descending aortic aneurysm ([address]     Fibromyalgia     Hypercholesterolemia     Hypertension     Ill-defined condition     Sleep apnea     mouth guard, oxygen HS       Past Surgical History:   Procedure Laterality Date    COLONOSCOPY N/A 7/3/2017    COLONOSCOPY with biopsies performed by Faheem Marrero MD at [facility]    HX APPENDECTOMY      HX CATARACT REMOVAL      HX TUBAL LIGATION       Allergies   Allergen Reactions    Benadryl [Diphenhydramine Hcl] Hives    Codeine Itching    Cymbalta [Duloxetine] Other (comments)     Shaking, feeling someone living inside my body, arms going numb.  Iodinated Contrast- Oral And Iv Dye Hives     Pt states severe hives     Levaquin [Levofloxacin] Nausea Only    Lyrica [Pregabalin] Other (comments)     Shaking, feeling someone living in my body, numb arm. Any meds for fibromyialga    Other Medication Swelling     Pt wrote having an allergic reaction to \"tetnus\". \"Extreme swelling at site\"     Sulfur Hives     Current Outpatient Prescriptions   Medication Sig Dispense Refill    OXYGEN-AIR DELIVERY SYSTEMS 2 L/min by Does Not Apply route nightly.  Flaxseed Oil oil by Does Not Apply route.  ergocalciferol (ERGOCALCIFEROL) 50,000 unit capsule Indications: vitamin D2  0    NIFEdipine ER (PROCARDIA XL) 30 mg ER tablet Take 30 mg by mouth daily.  atenolol (TENORMIN) 50 mg tablet Take  by mouth daily.  losartan (COZAAR) 100 mg tablet Take 100 mg by mouth nightly.  atorvastatin (LIPITOR) 40 mg tablet Take  by mouth daily.  pantoprazole (PROTONIX) 40 mg tablet Take 40 mg by mouth two (2) times a day.  zolpidem (AMBIEN) 10 mg tablet Take 5 mg by mouth nightly as needed for Sleep.  levocetirizine (XYZAL) 5 mg tablet Take  by mouth daily.  fluticasone (FLONASE) 50 mcg/actuation nasal spray two (2) times daily as needed.  montelukast (SINGULAIR) 10 mg tablet Take 10 mg by mouth nightly.  multivitamin (ONE A DAY) tablet Take 1 Tab by mouth daily.  calcium-cholecalciferol, d3, (CALCIUM 600 + D) 600-125 mg-unit tab Take  by mouth daily.  aspirin delayed-release 81 mg tablet Take  by mouth every other day.  VITAMIN B COMPLEX (B COMPLETE PO) Take  by mouth daily.  MAGNESIUM CARBONATE PO Take  by mouth daily.       potassium 99 mg tablet Take 45.5 mg by mouth daily.        Review of Systems:  HEENT: No epistaxis, no nasal drainage, no difficulty in swallowing, no redness in eyes  Respiratory: as above  Cardiovascular: no chest pain, no palpitations, no chronic leg edema, no syncope  Gastrointestinal: no abd pain, no vomiting, no diarrhea, no bleeding symptoms  Genitourinary: No urinary symptoms or hematuria  Integument/breast: No ulcers or rashes  Musculoskeletal:Neg  Neurological: No focal weakness, no seizures, no headaches  Behvioral/Psych: No anxiety, no depression  Constitutional: No fever, no chills, no weight loss, no night sweats     Objective:     Visit Vitals    /58 (BP 1 Location: Left arm, BP Patient Position: Sitting)    Pulse 64    Temp 98.7 °F (37.1 °C) (Oral)    Resp 16    Ht 5' 3\" (1.6 m)    Wt 81.6 kg (180 lb)    SpO2 94%    BMI 31.89 kg/m2        Physical Exam:   General: comfortable, no acute distress  HEENT: pupils reactive, sclera anicteric, EOM intact  Neck: No adenopathy or thyroid swelling, no lymphadenopathy or JVD, supple  CVS: S1S2 no murmurs  RS: Mod AE bilaterally, no tactile fremitus or egophony, no accessory muscle use  Abd: soft, non tender, no hepatosplenomegaly  Neuro: non focal, awake, alert  Extrm: no leg edema, clubbing or cyanosis  Skin: no rash    Data review:   PFT's:  Flows:  Maximal Mid Expiratory Flow rate is reduced to 48 % predicted  Forced Expiratory Volume in one second is normal  FEV 1% is reduced  Volumes:  Normal Volumes  Flow Volume Loop:  Reduced Terminal Flows in the Flow Volume Loop  Bronchodilator:  Significant improvement with bronchodilator  Diffusion:  Low Normal Diffusion Capacity  Impression:  Mild to Moderate obstructive defect, predominately small airways    Imaging:  I have personally reviewed the patients radiographs and have reviewed the reports:  CT scan 10/2017:  6 mm right upper lobe nodule against the minor fissure is stable over the  last 3 years, benign    CT scan of chest ( [facility] 5/2016. LUNGS:  Nodules:  -5 mm perifissural nodule along the right minor fissure (image 131), solid circumscribed, likely an intrapulmonary lymph node. -5 mm groundglass nodule right lung apex (image 56). Other pulmonary findings:  Patchy coarse reticular opacification in the mid and lower lungs, scarring and/or subsegmental atelectasis. Few regions of mild cylindrical bronchiectasis. OTHER:   No significant incidental findings. CT scan 7/2015:  Stable pulmonary nodule along the minor fissure and stable borderline  precarinal mediastinal lymph node since 9/2014. Ct scan of chest:6/2014:C  There is a 0.5 x 0.4 cm noncalcified ovoid density in the right upper lobe   inferior aspect abutting the minor fissure (image #32). No dominant lung mass   is detected. Subsegmental atelectatic changes are identified in the right   middle lobe and the right lower lobe. Less pronounced subtle subsegmental   atelectatic changes also identified in the lingular region and left lung base. No focal infiltrate or consolidation is observed. Ct scan of abd/chest- 9/2014;  1. Stable right pulmonary nodule. 2. The descending thoracic aortic aneurysm is stable. Ectasia of the ascending   aorta is stable. 3. No evidence for bowel obstruction or inflammation. Inspissated stool in the   descending colon could be the result of constipation. 4. 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 2-D echo:  Left ventricle: Size was normal. Systolic function was normal by EF  (biplane method of disks). Ejection fraction was estimated to be 55 %. There were no regional wall motion abnormalities. Wall thickness was  normal. Doppler parameters were consistent with abnormal left ventricular  relaxation (grade 1 diastolic dysfunction). Right ventricle: Systolic pressure was mildly increased. Estimated peak  pressure was 35 mmHg. Left atrium: The atrium was mildly dilated.   Mitral valve: There was mild annular calcification. Tricuspid valve: There was trace to mild regurgitation. Pulmonary arteries: Systolic pressure was mildly increased. IMPRESSION:   Lung nodule- incidental finding of 0.5x0.4 cm RUL nodule in a patient who has been a smoker 52 PPD. High risk for malignancy and needs appropriate follow up. 24 month stability demonstrated. Discussed report with patient. Need to follow up annually per USTFP recommendations- low dose lung CT screening protocol or can accept Ct done for evaluation of Aneurysm. 6 mm right upper lobe nodule against the minor fissure is stable over the last 3 years, benign  · Bronchitis- acute on chronic with bronchospasm resolved exacerbation and stable at baseline state- Asthma and/or COPD and obstructive features, also with some radiologic findings consistent with chronic atelectasis- asymptomatic  · Nocturnal hypoxemia  · Mild pulmonary HTN   · Allergic rhinitis controlled on Flonase and allergy injections  · Esophageal stricture- s/p dilatation  And h/o hiatal hernia ? 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 Now yearly LDCT   Continue Nocturnal oxygen supplementation based on documented Oxygen desaturation during sleep despite LESLIE corrective devices including CPAP. Qualifying study completed on 6/9/2018  This patient has hypoxia related clinical diagnosis that will improve with oxygen therapy. Other treatment measures have been tried and have been ineffective.   Continue seasonal LABA-ICS and prn JOBY  Continue prn albuterol  Antireflux therapy and continued interventions- dietary and medications  Maintain smoking cessation- congratulated on success  Preventive vaccinations  Will follow up   Questions and concerns addressed Mandy Chew MD

## 2018-06-14 NOTE — PROGRESS NOTES
Chief Complaint   Patient presents with    Breathing Problem    P0/YDYVRC     recert     1. Have you been to the ER, urgent care clinic since your last visit? Hospitalized since your last visit? No    2. Have you seen or consulted any other health care providers outside of the [address] [place] [address] since your last visit? Include any pap smears or colon screening.  Yes Where: Pain Management

## 2018-06-27 ENCOUNTER — TELEPHONE (OUTPATIENT)
Dept: PULMONOLOGY | Age: 68
End: 2018-06-27

## 2018-06-27 NOTE — TELEPHONE ENCOUNTER
[facility] [address] [place] [address] [phone] WANTS TO TALK TO NURSE REGARDING PT'S TESTING AND NOTES.

## 2018-06-27 NOTE — TELEPHONE ENCOUNTER
Per Arminda Cedeño, they need a new oxygen order from Dr Millie Desir. Office note and titration faxed. Please fax order to [phone].

## 2018-06-27 NOTE — TELEPHONE ENCOUNTER
Called [facility], transferred to [place] voice mail. No message left. [facility]. She states they need the titration study and June office note. They will generate the CMN from the order in march and fax over for doctors signature.    Titration Study and office note faxed today as requested by \[facility]\"" with [facility]

## 2018-09-28 ENCOUNTER — HOSPITAL ENCOUNTER (OUTPATIENT)
Dept: CT IMAGING | Age: 68
Discharge: HOME | End: 2018-09-28
Attending: PHYSICIAN ASSISTANT
Payer: MEDICARE

## 2018-09-28 DIAGNOSIS — I71.9 DESCENDING AORTIC ANEURYSM (HCC): ICD-10-CM

## 2018-09-28 DIAGNOSIS — I71.40 ABDOMINAL AORTIC ANEURYSM (AAA) WITHOUT RUPTURE: ICD-10-CM

## 2018-09-28 PROCEDURE — 74176 CT ABD & PELVIS W/O CONTRAST: CPT

## 2018-10-11 ENCOUNTER — OFFICE VISIT (OUTPATIENT)
Dept: VASCULAR SURGERY | Age: 68
End: 2018-10-11

## 2018-10-11 VITALS
RESPIRATION RATE: 16 BRPM | BODY MASS INDEX: 31.89 KG/M2 | DIASTOLIC BLOOD PRESSURE: 60 MMHG | HEIGHT: 63 IN | HEART RATE: 78 BPM | WEIGHT: 180 LBS | SYSTOLIC BLOOD PRESSURE: 102 MMHG

## 2018-10-11 DIAGNOSIS — M47.816 SPONDYLOSIS OF LUMBAR REGION WITHOUT MYELOPATHY OR RADICULOPATHY: ICD-10-CM

## 2018-10-11 DIAGNOSIS — R91.1 LUNG NODULE: ICD-10-CM

## 2018-10-11 DIAGNOSIS — I71.40 ABDOMINAL AORTIC ANEURYSM (AAA) WITHOUT RUPTURE: ICD-10-CM

## 2018-10-11 DIAGNOSIS — I71.9 DESCENDING AORTIC ANEURYSM (HCC): Primary | ICD-10-CM

## 2018-10-11 NOTE — MR AVS SNAPSHOT
[address] 
[phone] Patient: Yelena Gonzales MRN: [id number] MKQ:[birth year] Visit Information Date & Time Provider Department Dept. Phone Encounter #  
 10/11/2018  9:15 AM [facility] [phone] [id number] Follow-up Instructions Return in about 1 year (around 10/11/2019). Your Appointments 10/21/2019  9:00 AM  
Follow Up with [facility] Appt Note: 1 year follow up after ct and kali will call pt to schedule study [address] [facility] 480 [address] [facility]  
[phone] [address] [facility] 706 [facility] Upcoming Health Maintenance Date Due Hepatitis C Screening 1950 DTaP/Tdap/Td series (1 - Tdap) 9/11/1970 Shingrix Vaccine Age 50> (1 of 2) 9/11/1999 FOBT Q 1 YEAR AGE 50-75 9/11/1999 GLAUCOMA SCREENING Q2Y 9/11/2014 Pneumococcal 65+ Low/Medium Risk (1 of 2 - PCV13) 7/1/2015 Influenza Age 5 to Adult 8/1/2018 MEDICARE YEARLY EXAM 10/4/2018 BREAST CANCER SCRN MAMMOGRAM 5/1/2020 Allergies as of 10/11/2018  Review Complete On: 10/11/2018 By: Natty Christensen LPN Severity Noted Reaction Type Reactions Benadryl [Diphenhydramine Hcl]  07/09/2014    Hives Codeine  07/09/2014    Itching Cymbalta [Duloxetine]  02/16/2016   Side Effect Other (comments) Shaking, feeling someone living inside my body, arms going numb. Iodinated Contrast- Oral And Iv Dye  07/09/2014    Hives Pt states severe hives Levaquin [Levofloxacin]  07/09/2014    Nausea Only Lyrica [Pregabalin]  02/16/2016    Other (comments) Shaking, feeling someone living in my body, numb arm. Any meds for fibromyialga Other Medication  08/18/2016    Swelling Pt wrote having an allergic reaction to \"tetnus\". \"Extreme swelling at site\" Sulfur  07/09/2014    Hives Current Immunizations  Reviewed on 7/1/2016 Name Date Pneumococcal Polysaccharide (PPSV-23) 7/1/2014 Not reviewed this visit You Were Diagnosed With   
  
 Codes Comments Descending aortic aneurysm ([facility]    -  Primary ICD-10-CM: I71.9 ICD-9-CM: 522. 9 Abdominal aortic aneurysm (AAA) without rupture (HCC)     ICD-10-CM: I71.4 ICD-9-CM: 441.4 Lung nodule     ICD-10-CM: R91.1 ICD-9-CM: 793.11 Vitals BP Pulse Resp Height(growth percentile) Weight(growth percentile) BMI  
 102/60 (BP 1 Location: Left arm, BP Patient Position: Sitting) 78 16 5' 3\" (1.6 m) 180 lb (81.6 kg) 31.89 kg/m2 OB Status Smoking Status Postmenopausal Former Smoker Vitals History BMI and BSA Data Body Mass Index Body Surface Area  
 31.89 kg/m 2 1.9 m 2 Preferred Pharmacy Pharmacy Name Phone [address], [address] [place] [address] [phone] Your Updated Medication List  
  
   
This list is accurate as of 10/11/18  9:34 AM.  Always use your most recent med list.  
  
  
  
  
 AMBIEN 10 mg tablet Generic drug:  zolpidem Take 5 mg by mouth nightly as needed for Sleep. aspirin delayed-release 81 mg tablet Take  by mouth every other day. B COMPLETE PO Take  by mouth daily. CALCIUM 600 + D 600-125 mg-unit Tab Generic drug:  calcium-cholecalciferol (d3) Take  by mouth daily. COZAAR 100 mg tablet Generic drug:  losartan Take 100 mg by mouth nightly.  
  
 ergocalciferol 50,000 unit capsule Commonly known as:  ERGOCALCIFEROL Indications: vitamin D2 Flaxseed Oil Oil  
by Does Not Apply route. FLONASE 50 mcg/actuation nasal spray Generic drug:  fluticasone  
two (2) times daily as needed. LIPITOR 40 mg tablet Generic drug:  atorvastatin Take  by mouth daily. MAGNESIUM CARBONATE PO Take  by mouth daily. multivitamin tablet Commonly known as:  ONE A DAY Take 1 Tab by mouth daily. OXYGEN-AIR DELIVERY SYSTEMS  
2 L/min by Does Not Apply route nightly. pantoprazole 40 mg tablet Commonly known as:  PROTONIX Take 40 mg by mouth two (2) times a day. potassium 99 mg tablet Take 45.5 mg by mouth daily. PROCARDIA XL 30 mg ER tablet Generic drug:  NIFEdipine ER Take 30 mg by mouth daily. SINGULAIR 10 mg tablet Generic drug:  montelukast  
Take 10 mg by mouth nightly. TENORMIN 50 mg tablet Generic drug:  atenolol Take  by mouth daily. XYZAL 5 mg tablet Generic drug:  levocetirizine Take  by mouth daily. Follow-up Instructions Return in about 1 year (around 10/11/2019). To-Do List   
 10/11/2019 Imaging:  CT CHEST ABD PELV WO CONT Introducing [facility]! Halina Caceres introduces [employer] patient portal. Now you can access parts of your medical record, email your doctor's office, and request medication refills online. 1. In your internet browser, go to [email or web address] 2. Click on the First Time User? Click Here link in the Sign In box. You will see the New Member Sign Up page. 3. Enter your [employer] Access Code exactly as it appears below. You will not need to use this code after youve completed the sign-up process. If you do not sign up before the expiration date, you must request a new code. · [employer] Access Code: [email or web address] Expires: 12/25/2018  3:32 PM 
 
4. Enter the last four digits of your Social Security Number (xxxx) and Date of Birth (mm/dd/yyyy) as indicated and click Submit. You will be taken to the next sign-up page. 5. Create a [employer] ID. This will be your [employer] login ID and cannot be changed, so think of one that is secure and easy to remember. 6. Create a [employer] password. You can change your password at any time. 7. Enter your Password Reset Question and Answer. This can be used at a later time if you forget your password. 8. Enter your e-mail address. You will receive e-mail notification when new information is available in [address] 9. Click Sign Up. You can now view and download portions of your medical record. 10. Click the Download Summary menu link to download a portable copy of your medical information. If you have questions, please visit the Frequently Asked Questions section of the [employer] website. Remember, [employer] is NOT to be used for urgent needs. For medical emergencies, dial 911. Now available from your iPhone and Android! Please provide this summary of care documentation to your next provider. Your primary care clinician is listed as Vidhya Vega. If you have any questions after today's visit, please call [phone].

## 2018-10-11 NOTE — PROGRESS NOTES
1. Have you been to an emergency room or urgent care clinic since your last visit? No  Hospitalized since your last visit? If yes, where, when, and reason for visit? No  2. Have you seen or consulted any other health care providers outside of the [facility] since your last visit including any procedures, health maintenance items. If yes, where, when and reason for visit?

## 2018-10-11 NOTE — PROGRESS NOTES
Jax Falk    Chief Complaint   Patient presents with    Abdominal Aortic Aneurysm       History and Physical    Jax Falk is a 70 y.o. female who presents today for her one year f/u for a descending aortic aneurysm and AAA. She is doing well and is without complaint in the office today. She does have some chronic back pain but denies any new pain outside of this. She denies any abdominal.  She does state that she has been having some shortness of breath recently and she does follow with cardiology both. No chest pain. No fever/chills. She did have follow-up CT scan which was done without contrast due to severe allergic reaction to IV contrast dye. Imaging showed stable 3.6cm descending aortic aneurysm as well as 3.2cm AAA.        Past Medical History:   Diagnosis Date    Arthritis     Chronic lung disease     Chronic obstructive pulmonary disease ([address]     Descending aortic aneurysm ([address]     Fibromyalgia     Hypercholesterolemia     Hypertension     Ill-defined condition     Sleep apnea     mouth guard, oxygen HS     Past Surgical History:   Procedure Laterality Date    COLONOSCOPY N/A 7/3/2017    COLONOSCOPY with biopsies performed by Casa Parrish MD at [facility]    HX APPENDECTOMY      HX CATARACT REMOVAL      HX TUBAL LIGATION       Patient Active Problem List   Diagnosis Code    Descending aortic aneurysm ([facility] I71.9    Hypertension I10    Hypercholesterolemia E78.00    Arthritis M19.90    Incidental lung nodule, > 3mm and < 8mm R91.1    Bronchitis with chronic airway obstruction (HCC) J44.9    Cervical disc disease M50.90    Osteoarthritis of spine with radiculopathy, lumbar region M47.26    Neuropathy G62.9    Fibromyalgia M79.7    Spondylosis of lumbar region without myelopathy or radiculopathy M47.816    Lumbar facet arthropathy M47.816    Lumbar degenerative disc disease M51.36    Chronic pain syndrome G89.4    Spondylosis of cervical region without myelopathy or radiculopathy M47.812    Cervical facet syndrome M47.812    Degenerative disc disease, cervical M50.30     Current Outpatient Prescriptions   Medication Sig Dispense Refill    OXYGEN-AIR DELIVERY SYSTEMS 2 L/min by Does Not Apply route nightly.  Flaxseed Oil oil by Does Not Apply route.  ergocalciferol (ERGOCALCIFEROL) 50,000 unit capsule Indications: vitamin D2  0    NIFEdipine ER (PROCARDIA XL) 30 mg ER tablet Take 30 mg by mouth daily.  atenolol (TENORMIN) 50 mg tablet Take  by mouth daily.  losartan (COZAAR) 100 mg tablet Take 100 mg by mouth nightly.  atorvastatin (LIPITOR) 40 mg tablet Take  by mouth daily.  pantoprazole (PROTONIX) 40 mg tablet Take 40 mg by mouth two (2) times a day.  zolpidem (AMBIEN) 10 mg tablet Take 5 mg by mouth nightly as needed for Sleep.  levocetirizine (XYZAL) 5 mg tablet Take  by mouth daily.  fluticasone (FLONASE) 50 mcg/actuation nasal spray two (2) times daily as needed.  montelukast (SINGULAIR) 10 mg tablet Take 10 mg by mouth nightly.  multivitamin (ONE A DAY) tablet Take 1 Tab by mouth daily.  calcium-cholecalciferol, d3, (CALCIUM 600 + D) 600-125 mg-unit tab Take  by mouth daily.  aspirin delayed-release 81 mg tablet Take  by mouth every other day.  VITAMIN B COMPLEX (B COMPLETE PO) Take  by mouth daily.  MAGNESIUM CARBONATE PO Take  by mouth daily.  potassium 99 mg tablet Take 45.5 mg by mouth daily. Allergies   Allergen Reactions    Benadryl [Diphenhydramine Hcl] Hives    Codeine Itching    Cymbalta [Duloxetine] Other (comments)     Shaking, feeling someone living inside my body, arms going numb.  Iodinated Contrast- Oral And Iv Dye Hives     Pt states severe hives     Levaquin [Levofloxacin] Nausea Only    Lyrica [Pregabalin] Other (comments)     Shaking, feeling someone living in my body, numb arm.   Any meds for fibromyialga    Other Medication Swelling     Pt wrote having an allergic reaction to \"tetnus\". \"Extreme swelling at site\"     Sulfur Hives       Physical Exam:    Visit Vitals    /60 (BP 1 Location: Left arm, BP Patient Position: Sitting)    Pulse 78    Resp 16    Ht 5' 3\" (1.6 m)    Wt 180 lb (81.6 kg)    BMI 31.89 kg/m2      General: Well-appearing female in no acute distress   HEENT: EOMI, no scleral icterus is noted. Pulmonary: No increased work or breathing is noted. Abdomen: nondistended    Extremities: Warm and well perfused bilaterally. No significant BLE edema. Neuro: Cranial nerves II through XII are grossly intact   Integument: No ulcerations are identified visibly      Impression and Plan:  Olivia De Leon is a 70 y.o. female with stable 3.6cm descending aortic aneurysm as well as 3.2cm AAA. Imaging was reviewed and office today with patient. She does have chronic pain secondary to her spinal issues but denies any other pain or complaints. No CP. No abdominal pain. No claudication. Will repeat CT scan in 1 year for continued surveillance and she will f/u afterwards. Sooner as needed. Plan was discussed. Patient expresses understanding and agrees. Follow-up Disposition:  Return in about 1 year (around 10/11/2019).     Liberty Billings  [phone]

## 2019-05-22 ENCOUNTER — HOSPITAL ENCOUNTER (OUTPATIENT)
Dept: LAB | Age: 69
Discharge: HOME | End: 2019-05-22
Payer: MEDICARE

## 2019-05-22 PROCEDURE — 36415 COLL VENOUS BLD VENIPUNCTURE: CPT

## 2019-05-22 PROCEDURE — 82785 ASSAY OF IGE: CPT

## 2019-05-28 LAB — IGE SERPL-ACNC: 21 IU/ML (ref 6–495)

## 2019-06-06 ENCOUNTER — HOSPITAL ENCOUNTER (OUTPATIENT)
Dept: MAMMOGRAPHY | Age: 69
Discharge: HOME | End: 2019-06-06
Attending: FAMILY MEDICINE
Payer: MEDICARE

## 2019-06-06 DIAGNOSIS — Z12.31 VISIT FOR SCREENING MAMMOGRAM: ICD-10-CM

## 2019-06-06 PROCEDURE — 77067 SCR MAMMO BI INCL CAD: CPT

## 2019-06-17 ENCOUNTER — OFFICE VISIT (OUTPATIENT)
Dept: PULMONOLOGY | Age: 69
End: 2019-06-17

## 2019-06-17 VITALS
RESPIRATION RATE: 19 BRPM | WEIGHT: 176 LBS | OXYGEN SATURATION: 97 % | DIASTOLIC BLOOD PRESSURE: 70 MMHG | TEMPERATURE: 98.3 F | SYSTOLIC BLOOD PRESSURE: 130 MMHG | HEIGHT: 63 IN | BODY MASS INDEX: 31.18 KG/M2 | HEART RATE: 67 BPM

## 2019-06-17 DIAGNOSIS — J44.9 CHRONIC OBSTRUCTIVE PULMONARY DISEASE, UNSPECIFIED COPD TYPE (HCC): Primary | ICD-10-CM

## 2019-06-17 DIAGNOSIS — J30.9 ALLERGIC RHINITIS, UNSPECIFIED SEASONALITY, UNSPECIFIED TRIGGER: ICD-10-CM

## 2019-06-17 DIAGNOSIS — G47.34 NOCTURNAL HYPOXIA: ICD-10-CM

## 2019-06-17 DIAGNOSIS — J40 BRONCHITIS: ICD-10-CM

## 2019-06-17 NOTE — PATIENT INSTRUCTIONS
 Continue singulair 10 mg daily.      · Continue flonase as needed for seasonal allergies     Continue albuterol  MDI every 4-6 hours as needed for shortness of breath, wheezing, or cough    · Continue supplemental oxygen at 2 LPm at night    · Follow up CT in October       Recommend healthy diet/weight and exercise as tolerated     Follow-up in pulmonary clinic in or sooner with worsening of symptom     Report to the ER with chest pain or difficulty breathing

## 2019-06-17 NOTE — PROGRESS NOTES
[facility]  Pulmonary, Critical Care, and Sleep Medicine      Pulmonary Office Progress Notes    Name: Margaret Antony     : 1950     Date: 2019        Subjective:     2019          HPI    Margaret Antony  is a 70 y.o. female with PMH of nocturnal hypoxia who presents for routine follow up and oxygen qualification. She was last seen here on 18 by Dr. Kristian Buckley after a titration study which suggested nocturnal O2 desaturation despite CPAP 9 cm. She was ordered to have supplemental O2 HS @ 2 LPM.  Today she appears comfortable, in no distress and reports that she has occasional SOB, only after excessive activity. She denies cough, wheezing, chest pain or hemoptysis. No fever, chills or orthopnea; no leg / calf pain or swelling and no decreased appetite or weight loss. She has some allergic rhinitis that is controlled with flonase PRN. She uses her albuterol rescue inhaler rarely, about twice per week and takes singulair 10 mg daily. Her last PFTs were on 14 and demonstrated mild - moderate obstructive defect. She is a former smoker of cigarettes with a 45 pack year history and quit in .     6 minute walk test today demonstrated no significant oxygen desaturation with ambulation, however patient will continue to require O2 HS @ 2LPM due to nocturnal hypoxia that is not improved with CPAP therapy. Past Medical History:   Diagnosis Date    Arthritis     Chronic lung disease     Chronic obstructive pulmonary disease ([facility] [address]     Descending aortic aneurysm (HCC)     Fibromyalgia     Hypercholesterolemia     Hypertension     Ill-defined condition     Sleep apnea     mouth guard, oxygen HS       Allergies   Allergen Reactions    Benadryl [Diphenhydramine Hcl] Hives    Codeine Itching    Cymbalta [Duloxetine] Other (comments)     Shaking, feeling someone living inside my body, arms going numb.     Iodinated Contrast- Oral And Iv Dye Hives     Pt states severe hives     Levaquin [Levofloxacin] Nausea Only    Lyrica [Pregabalin] Other (comments)     Shaking, feeling someone living in my body, numb arm. Any meds for fibromyialga    Other Medication Swelling     Pt wrote having an allergic reaction to \"tetnus\". \"Extreme swelling at site\"     Sulfur Hives       Current Outpatient Medications   Medication Sig Dispense Refill    OXYGEN-AIR DELIVERY SYSTEMS 2 L/min by Does Not Apply route nightly.  Flaxseed Oil oil by Does Not Apply route.  ergocalciferol (ERGOCALCIFEROL) 50,000 unit capsule Indications: vitamin D2  0    NIFEdipine ER (PROCARDIA XL) 30 mg ER tablet Take 30 mg by mouth daily.  atenolol (TENORMIN) 50 mg tablet Take  by mouth daily.  losartan (COZAAR) 100 mg tablet Take 100 mg by mouth nightly.  atorvastatin (LIPITOR) 40 mg tablet Take  by mouth daily.  pantoprazole (PROTONIX) 40 mg tablet Take 40 mg by mouth two (2) times a day.  zolpidem (AMBIEN) 10 mg tablet Take 5 mg by mouth nightly as needed for Sleep.  levocetirizine (XYZAL) 5 mg tablet Take  by mouth daily.  fluticasone (FLONASE) 50 mcg/actuation nasal spray two (2) times daily as needed.  montelukast (SINGULAIR) 10 mg tablet Take 10 mg by mouth nightly.  multivitamin (ONE A DAY) tablet Take 1 Tab by mouth daily.  calcium-cholecalciferol, d3, (CALCIUM 600 + D) 600-125 mg-unit tab Take  by mouth daily.  aspirin delayed-release 81 mg tablet Take  by mouth every other day.  VITAMIN B COMPLEX (B COMPLETE PO) Take  by mouth daily.  MAGNESIUM CARBONATE PO Take  by mouth daily.  potassium 99 mg tablet Take 45.5 mg by mouth daily. Review of Systems:    HEENT: No epistaxis,  no difficulty in swallowing, no redness in eyes.   Allergic rhinitis  Respiratory: As stated above in HPI  Cardiovascular: no chest pain, no palpitations, no chronic leg edema, no syncope  Gastrointestinal: no abd pain, no vomiting, no diarrhea, no bleeding symptoms  Genitourinary: No urinary symptoms or hematuria  Integument/breast: No ulcers or rashes  Musculoskeletal: No leg / calf pain  Neurological: No focal weakness, no seizures, no headaches  Behvioral/Psych: No anxiety, no depression  Constitutional: No fever, chills or night sweats. No decreased appetite or weight loss     Objective:     Visit Vitals  /70 (BP 1 Location: Left arm, BP Patient Position: At rest)   Pulse 67   Temp 98.3 °F (36.8 °C) (Oral)   Resp 19   Ht 5' 3\" (1.6 m)   Wt 79.8 kg (176 lb)   SpO2 97%   BMI 31.18 kg/m²        PHYSICAL EXAM      General: Oriented to person, place, and time. Well-developed, well-nourished, and in no distress      Head:   Normocephalic, without obvious abnormality, atraumatic       Eyes:   Pupils reactive, conjunctivae / corneas clear. EOM's intact, no scleral icterus       Nose:   Nares normal, no drainage. Throat:    Lips, mucosa and tongue normal. Teeth and gums normal       Neck:   Supple, symmetrical, trachea midline. No adenopathy or thyroid swelling; no carotid bruit or JVD. CVS:    Regular rate and rhythm. S1S2 normal,  no murmurs       RS:      Symmetrical chest rise, moderate AE bilaterally. Lung sounds clear to auscultation bilaterally. No wheezing, rales or rhonchi, no accessory muscle use      Abd:     Soft, non-tender.   No hepatosplenomegaly                                                     Neuro:   non focal, awake, alert and oriented to person, place, time and situation    Extrm:   no leg edema,  clubbing or cyanosis       Skin:   no rash    Data review:     Hospital Outpatient Visit on 05/22/2019   Component Date Value Ref Range Status    Immunoglobulin E 05/22/2019 21  6 - 495 IU/mL Final    Comment: (NOTE)               **Please note reference interval change**  Performed At: [address] [id number]  Althea Kapoor MD CO:[id number] PULMONARY FUNCTION TESTS    Date FVC FEV1  FEV1/FVC GPC18-54 TLC RV RV/TLC VC DLCO   8/7/14 95% 81% 66 48% 98% 95% 97% 100% 17.10  79%                                             Imaging:  I have personally reviewed the patients radiographs and have reviewed the reports:  XR Results (most recent):  Results from Hospital Encounter encounter on 11/14/17   [facility] XR TECHNOLOGIST SERVICE    Narrative Fluoroscopy was provided for a bundled exam for documentation purposes. Impression IMPRESSION:    Please see above. FLUORO TIME: 00.06      [facility]       CT Results (most recent):  Results from Hospital Encounter encounter on 09/28/18   CT CHEST ABD PELV WO CONT    Narrative CT chest, abdomen and pelvis without IV contrast.    INDICATION: Abdominal aortic aneurysm without rupture. All CT scans at this facility are performed using dose optimization technique as  appropriate to a performed exam, to include automated exposure control,  adjustment of the mA and/or kV according to patient size (including appropriate  matching for site specific examination) or use of iterative reconstruction  technique. COMPARISON: October 5, 2017    Aorta: Ascending aorta shows no aneurysm. Mild calcification. Moderate  calcification in the aortic arch. Thoracic descending aorta shows normal caliber  with scattered calcification. At the diaphragm level, aortic caliber is 3.6 cm in AP dimension, slightly  increased from previous study (3.4 cm). Mild gradual dilatation of the abdominal  aorta. Below the renal artery, aneurysm measures 3.2 x 3 cm, with moderately  dense circumferential calcification. It has slightly increased from 3.1 x 2.7  cm. At the bifurcation, circumferential calcification noted. Aorta measures 2.3 x  2.1 cm, slightly increased from 2.1 x 2 cm. Right common iliac artery measures 1.2 cm. Left common iliac artery measures 0.7  cm. Dense calcification. Essentially stable.     There is no surrounding inflammation, fluid collection or mass throughout the  entire aorta. CHEST: No mediastinal or hilar mass. No cardiomegaly or pericardial effusion. Mild atelectasis in the lung bases with no consolidation or pleural effusion. No  pneumothorax. ABDOMEN/PELVIS: Unenhanced liver, gallbladder, spleen, pancreas and adrenal  glands unremarkable. Kidneys also unremarkable. Small bowel is not distended. Colon with moderate fecal retention. No  inflammation. No ascites or free air. Uterus is unremarkable. No pelvic or  adnexal mass. Bladder is not full. No adenopathy. Impression IMPRESSION:  1. Slightly increased abdominal aortic aneurysm as described. Dense  circumferential calcification distally. No surrounding inflammation or fluid  collection. 2. No acute findings in the chest, abdomen or pelvis otherwise. Patient Active Problem List   Diagnosis Code    Descending aortic aneurysm ([facility] [address] I71.9    Hypertension I10    Hypercholesterolemia E78.00    Arthritis M19.90    Incidental lung nodule, > 3mm and < 8mm R91.1    Bronchitis with chronic airway obstruction (HCC) J44.9    Cervical disc disease M50.90    Osteoarthritis of spine with radiculopathy, lumbar region M47.26    Neuropathy G62.9    Fibromyalgia M79.7    Spondylosis of lumbar region without myelopathy or radiculopathy M47.816    Lumbar facet arthropathy M47.816    Lumbar degenerative disc disease M51.36    Chronic pain syndrome G89.4    Spondylosis of cervical region without myelopathy or radiculopathy M47.812    Cervical facet syndrome M47.812    Degenerative disc disease, cervical M50.30       IMPRESSION:   · COPD - mild - moderate, well controlled without frequent exacerbations  · Nocturnal hypoxia - currently on supplemental O2 @ 2LPM HS  · Allergic rhinitis  ·       RECOMMENDATIONS:   · Continue  Singulair 10 mg daily.       · Continue flonase PRN for allergic rhinitis            · Continue albuterol MDI every 4-6 hours as needed for increased shortness of breath, wheezing or cough - discussed appropriate use of rescue inhaler with patient  · Continue O2 @ 2 LPM HS  · Recommend healthy diet / weight / exercise as tolerated  · Repeat CT chest in October  · Follow up in pulmonary clinic in  6 months  or sooner with worsening of symptoms  · Report to ER with chest pain or difficulty breathing.         Yousuf Jeffery NP

## 2019-06-17 NOTE — PROGRESS NOTES
Six Minute Walk Test (6MWT) recording form    Mervat Moss       [id number]                                    1950 female  [id number]    [x]  Medical history checked  [x]  Medical clearance provided for the patient to participate in exercise testing      Contraindications to 6MWT:  [] Resting heart rate > 120 beats / min after 10 minutes rest (relative contraindication)  [] Systolic blood pressure > 180 mm Hg +/- diastolic blood pressure > 100 mm Hg (relative contraindication)  [] Resting SpO2 < 85% on room air or on prescribed level of supplemental oxygen  [] Physical disability preventing safe performance  [x] No contraindications identified             6MWT        6/17/2019  11:52 AM       Supplemental Oxygen - None  Mobility Aid - None       Time Mins BP SP02 HR RR Distance Walked Rests/Comments   Rest 130/70 97 67 19  Room Air and at Rest     1  95 80  1.5 Laps   (51 meters) Room Air     2  95 85  2  Laps    (68 meters) Room Air     3  93 85  1.5  Laps  (51 meters) Room Air     4  93 88  2  Laps    (68 meters) Room Air     5  92 87  2  Laps    (68 meters) Room Air     6  93 87  2.5 Laps  (85 meters) Room Air     Recovery 1 133/70     90     90     21      Room Air     Recovery 2 130/70     95 62 19  Room Air       Total distance:     391 meters      Symptom recovery:    2 minutes               HR recovery:   1 minute                                          Limiting factor:       None                                     Was test terminated: [x] No   [] Yes  If yes, when? 6MWT Termination Criteria:  [] Chest pain or angina-like symptons  [] Heart rate > Predicted HR max.   [] Evolving mental confusioin, light-headedness or incoordination  [] Physical or verbal severe fatigue [] Intolerable dyspnea, unrelieved by rest  [] Persistent SP02 < 85% (Note pending clinical presentation)  [] Abnormal gait pattern (leg cramps, staggering, ataxia)  [] Other clinically warranted reason     INTERPRETATION:      Comparision 6 min walk distance:      RECOMMENDATION:      Wanda Pierre, RT

## 2019-07-05 ENCOUNTER — HOSPITAL ENCOUNTER (OUTPATIENT)
Dept: LAB | Age: 69
Discharge: HOME | End: 2019-07-05
Payer: MEDICARE

## 2019-07-05 DIAGNOSIS — E55.9 VITAMIN D DEFICIENCY: ICD-10-CM

## 2019-07-05 DIAGNOSIS — I10 HYPERTENSION, ESSENTIAL: ICD-10-CM

## 2019-07-05 DIAGNOSIS — E78.2 MIXED HYPERLIPIDEMIA: ICD-10-CM

## 2019-07-05 DIAGNOSIS — Z86.39 PERSONAL HISTORY OF NUTRITIONAL DEFICIENCY: ICD-10-CM

## 2019-07-05 LAB
25(OH)D3 SERPL-MCNC: 58.8 NG/ML (ref 30–100)
ALBUMIN SERPL-MCNC: 3.9 G/DL (ref 3.4–5)
ALBUMIN/GLOB SERPL: 1.3 {RATIO} (ref 0.8–1.7)
ALP SERPL-CCNC: 123 U/L (ref 45–117)
ALT SERPL-CCNC: 22 U/L (ref 13–56)
ANION GAP SERPL CALC-SCNC: 5 MMOL/L (ref 3–18)
AST SERPL-CCNC: 26 U/L (ref 15–37)
BASOPHILS # BLD: 0.1 K/UL (ref 0–0.1)
BASOPHILS NFR BLD: 1 % (ref 0–2)
BILIRUB SERPL-MCNC: 0.5 MG/DL (ref 0.2–1)
BUN SERPL-MCNC: 15 MG/DL (ref 7–18)
BUN/CREAT SERPL: 14 (ref 12–20)
CALCIUM SERPL-MCNC: 8.6 MG/DL (ref 8.5–10.1)
CHLORIDE SERPL-SCNC: 104 MMOL/L (ref 100–108)
CHOLEST SERPL-MCNC: 143 MG/DL
CO2 SERPL-SCNC: 33 MMOL/L (ref 21–32)
CREAT SERPL-MCNC: 1.11 MG/DL (ref 0.6–1.3)
DIFFERENTIAL METHOD BLD: ABNORMAL
EOSINOPHIL # BLD: 0.5 K/UL (ref 0–0.4)
EOSINOPHIL NFR BLD: 8 % (ref 0–5)
ERYTHROCYTE [DISTWIDTH] IN BLOOD BY AUTOMATED COUNT: 13.8 % (ref 11.6–14.5)
GLOBULIN SER CALC-MCNC: 3.1 G/DL (ref 2–4)
GLUCOSE SERPL-MCNC: 84 MG/DL (ref 74–99)
HCT VFR BLD AUTO: 36.3 % (ref 35–45)
HDLC SERPL-MCNC: 58 MG/DL (ref 40–60)
HDLC SERPL: 2.5 {RATIO} (ref 0–5)
HGB BLD-MCNC: 12.1 G/DL (ref 12–16)
LDLC SERPL CALC-MCNC: 51.6 MG/DL (ref 0–100)
LIPID PROFILE,FLP: ABNORMAL
LYMPHOCYTES # BLD: 1.8 K/UL (ref 0.9–3.6)
LYMPHOCYTES NFR BLD: 29 % (ref 21–52)
MCH RBC QN AUTO: 30.4 PG (ref 24–34)
MCHC RBC AUTO-ENTMCNC: 33.3 G/DL (ref 31–37)
MCV RBC AUTO: 91.2 FL (ref 74–97)
MONOCYTES # BLD: 0.6 K/UL (ref 0.05–1.2)
MONOCYTES NFR BLD: 9 % (ref 3–10)
NEUTS SEG # BLD: 3.2 K/UL (ref 1.8–8)
NEUTS SEG NFR BLD: 53 % (ref 40–73)
PLATELET # BLD AUTO: 190 K/UL (ref 135–420)
PMV BLD AUTO: 11.6 FL (ref 9.2–11.8)
POTASSIUM SERPL-SCNC: 4 MMOL/L (ref 3.5–5.5)
PROT SERPL-MCNC: 7 G/DL (ref 6.4–8.2)
RBC # BLD AUTO: 3.98 M/UL (ref 4.2–5.3)
SODIUM SERPL-SCNC: 142 MMOL/L (ref 136–145)
TRIGL SERPL-MCNC: 167 MG/DL (ref ?–150)
TSH SERPL DL<=0.05 MIU/L-ACNC: 0.73 UIU/ML (ref 0.36–3.74)
VLDLC SERPL CALC-MCNC: 33.4 MG/DL
WBC # BLD AUTO: 6.2 K/UL (ref 4.6–13.2)

## 2019-07-05 PROCEDURE — 85025 COMPLETE CBC W/AUTO DIFF WBC: CPT

## 2019-07-05 PROCEDURE — 36415 COLL VENOUS BLD VENIPUNCTURE: CPT

## 2019-07-05 PROCEDURE — 80053 COMPREHEN METABOLIC PANEL: CPT

## 2019-07-05 PROCEDURE — 80061 LIPID PANEL: CPT

## 2019-07-05 PROCEDURE — 84443 ASSAY THYROID STIM HORMONE: CPT

## 2019-07-05 PROCEDURE — 82306 VITAMIN D 25 HYDROXY: CPT

## 2019-08-21 ENCOUNTER — HOSPITAL ENCOUNTER (OUTPATIENT)
Dept: CT IMAGING | Age: 69
Discharge: HOME | End: 2019-08-21
Attending: PHYSICIAN ASSISTANT
Payer: MEDICARE

## 2019-08-21 DIAGNOSIS — J32.9 CHRONIC INFECTION OF SINUS: ICD-10-CM

## 2019-08-21 PROCEDURE — 70486 CT MAXILLOFACIAL W/O DYE: CPT

## 2019-10-03 ENCOUNTER — HOSPITAL ENCOUNTER (OUTPATIENT)
Dept: LAB | Age: 69
Discharge: HOME | End: 2019-10-03
Payer: MEDICARE

## 2019-10-03 DIAGNOSIS — N18.30 CHRONIC RENAL INSUFFICIENCY, STAGE III (MODERATE) (HCC): ICD-10-CM

## 2019-10-03 DIAGNOSIS — E78.1 HYPERGLYCERIDEMIA: ICD-10-CM

## 2019-10-03 LAB
ALBUMIN SERPL-MCNC: 4 G/DL (ref 3.4–5)
ALBUMIN/GLOB SERPL: 1.3 {RATIO} (ref 0.8–1.7)
ALP SERPL-CCNC: 133 U/L (ref 45–117)
ALT SERPL-CCNC: 22 U/L (ref 13–56)
ANION GAP SERPL CALC-SCNC: 3 MMOL/L (ref 3–18)
AST SERPL-CCNC: 30 U/L (ref 10–38)
BILIRUB SERPL-MCNC: 0.4 MG/DL (ref 0.2–1)
BUN SERPL-MCNC: 14 MG/DL (ref 7–18)
BUN/CREAT SERPL: 12 (ref 12–20)
CALCIUM SERPL-MCNC: 9.4 MG/DL (ref 8.5–10.1)
CHLORIDE SERPL-SCNC: 107 MMOL/L (ref 100–111)
CHOLEST SERPL-MCNC: 147 MG/DL
CO2 SERPL-SCNC: 33 MMOL/L (ref 21–32)
CREAT SERPL-MCNC: 1.14 MG/DL (ref 0.6–1.3)
GLOBULIN SER CALC-MCNC: 3.2 G/DL (ref 2–4)
GLUCOSE SERPL-MCNC: 105 MG/DL (ref 74–99)
HDLC SERPL-MCNC: 69 MG/DL (ref 40–60)
HDLC SERPL: 2.1 {RATIO} (ref 0–5)
LDLC SERPL CALC-MCNC: 55 MG/DL (ref 0–100)
LIPID PROFILE,FLP: ABNORMAL
POTASSIUM SERPL-SCNC: 4.8 MMOL/L (ref 3.5–5.5)
PROT SERPL-MCNC: 7.2 G/DL (ref 6.4–8.2)
SODIUM SERPL-SCNC: 143 MMOL/L (ref 136–145)
TRIGL SERPL-MCNC: 115 MG/DL (ref ?–150)
VLDLC SERPL CALC-MCNC: 23 MG/DL

## 2019-10-03 PROCEDURE — 80061 LIPID PANEL: CPT

## 2019-10-03 PROCEDURE — 36415 COLL VENOUS BLD VENIPUNCTURE: CPT

## 2019-10-03 PROCEDURE — 80053 COMPREHEN METABOLIC PANEL: CPT

## 2019-10-04 ENCOUNTER — HOSPITAL ENCOUNTER (OUTPATIENT)
Dept: CT IMAGING | Age: 69
Discharge: HOME | End: 2019-10-04
Attending: PHYSICIAN ASSISTANT
Payer: MEDICARE

## 2019-10-04 DIAGNOSIS — I71.9 DESCENDING AORTIC ANEURYSM (HCC): ICD-10-CM

## 2019-10-04 DIAGNOSIS — R91.1 LUNG NODULE: ICD-10-CM

## 2019-10-04 DIAGNOSIS — I71.40 ABDOMINAL AORTIC ANEURYSM (AAA) WITHOUT RUPTURE: ICD-10-CM

## 2019-10-04 PROCEDURE — 74176 CT ABD & PELVIS W/O CONTRAST: CPT

## 2019-10-21 ENCOUNTER — OFFICE VISIT (OUTPATIENT)
Dept: VASCULAR SURGERY | Age: 69
End: 2019-10-21

## 2019-10-21 VITALS
WEIGHT: 175 LBS | RESPIRATION RATE: 14 BRPM | BODY MASS INDEX: 31.01 KG/M2 | HEART RATE: 70 BPM | SYSTOLIC BLOOD PRESSURE: 122 MMHG | DIASTOLIC BLOOD PRESSURE: 70 MMHG | HEIGHT: 63 IN

## 2019-10-21 DIAGNOSIS — I71.9 DESCENDING AORTIC ANEURYSM (HCC): Primary | ICD-10-CM

## 2019-10-21 DIAGNOSIS — M47.816 SPONDYLOSIS OF LUMBAR REGION WITHOUT MYELOPATHY OR RADICULOPATHY: ICD-10-CM

## 2019-10-21 DIAGNOSIS — I71.40 ABDOMINAL AORTIC ANEURYSM (AAA) WITHOUT RUPTURE: ICD-10-CM

## 2019-10-21 NOTE — PROGRESS NOTES
Rakesh Hutton    Chief Complaint   Patient presents with    Abdominal Aortic Aneurysm       History and Physical    Rakesh Hutton is a 78 y.o. female who presents today for her one year f/u for a descending aortic aneurysm and AAA. She is doing well and is without complaint in the office today. She does have some chronic back pain but denies any new pain outside of this. She denies any abdominal.  No chest pain. No new medical issues over the past year. No fever/chills. She did have follow-up CT scan which was done without contrast due to severe allergic reaction to IV contrast dye. Imaging showed stable 3.5cm descending aortic aneurysm as well as ~3 cm AAA.        Past Medical History:   Diagnosis Date    Arthritis     Chronic lung disease     Chronic obstructive pulmonary disease ([address]     Descending aortic aneurysm ([address]     Fibromyalgia     Hypercholesterolemia     Hypertension     Ill-defined condition     Sleep apnea     mouth guard, oxygen HS     Past Surgical History:   Procedure Laterality Date    COLONOSCOPY N/A 7/3/2017    COLONOSCOPY with biopsies performed by Alisa Degroot MD at [facility] ENDOSCOPY    HX APPENDECTOMY      HX BREAST BIOPSY Left     HX CATARACT REMOVAL      HX TUBAL LIGATION       Patient Active Problem List   Diagnosis Code    Descending aortic aneurysm ([address] I71.9    Hypertension I10    Hypercholesterolemia E78.00    Arthritis M19.90    Incidental lung nodule, > 3mm and < 8mm R91.1    Bronchitis with chronic airway obstruction (HCC) J44.9    Cervical disc disease M50.90    Osteoarthritis of spine with radiculopathy, lumbar region M47.26    Neuropathy G62.9    Fibromyalgia M79.7    Spondylosis of lumbar region without myelopathy or radiculopathy M47.816    Lumbar facet arthropathy M47.816    Lumbar degenerative disc disease M51.36    Chronic pain syndrome G89.4    Spondylosis of cervical region without myelopathy or radiculopathy M47.812    Cervical facet syndrome M47.812    Degenerative disc disease, cervical M50.30     Current Outpatient Medications   Medication Sig Dispense Refill    OXYGEN-AIR DELIVERY SYSTEMS 2 L/min by Does Not Apply route nightly.  Flaxseed Oil oil by Does Not Apply route.  ergocalciferol (ERGOCALCIFEROL) 50,000 unit capsule Indications: vitamin D2  0    NIFEdipine ER (PROCARDIA XL) 30 mg ER tablet Take 30 mg by mouth daily.  atenolol (TENORMIN) 50 mg tablet Take 25 mg by mouth daily.  atorvastatin (LIPITOR) 40 mg tablet Take  by mouth daily.  pantoprazole (PROTONIX) 40 mg tablet Take 40 mg by mouth two (2) times a day.  zolpidem (AMBIEN) 10 mg tablet Take 5 mg by mouth nightly as needed for Sleep.  levocetirizine (XYZAL) 5 mg tablet Take  by mouth daily.  fluticasone (FLONASE) 50 mcg/actuation nasal spray two (2) times daily as needed.  montelukast (SINGULAIR) 10 mg tablet Take 10 mg by mouth nightly.  multivitamin (ONE A DAY) tablet Take 1 Tab by mouth daily.  calcium-cholecalciferol, d3, (CALCIUM 600 + D) 600-125 mg-unit tab Take  by mouth daily.  aspirin delayed-release 81 mg tablet Take  by mouth every other day.  VITAMIN B COMPLEX (B COMPLETE PO) Take  by mouth daily.  MAGNESIUM CARBONATE PO Take  by mouth daily.  potassium 99 mg tablet Take 45.5 mg by mouth daily. Allergies   Allergen Reactions    Benadryl [Diphenhydramine Hcl] Hives    Codeine Itching    Cymbalta [Duloxetine] Other (comments)     Shaking, feeling someone living inside my body, arms going numb.  Iodinated Contrast Media Hives     Pt states severe hives     Levaquin [Levofloxacin] Nausea Only    Lyrica [Pregabalin] Other (comments)     Shaking, feeling someone living in my body, numb arm. Any meds for fibromyialga    Other Medication Swelling     Pt wrote having an allergic reaction to \"tetnus\".  \"Extreme swelling at site\"     Sulfur Hives       Physical Exam:    Visit Vitals  /70 (BP 1 Location: Left arm, BP Patient Position: Sitting)   Pulse 70   Resp 14   Ht 5' 3\" (1.6 m)   Wt 175 lb (79.4 kg)   BMI 31.00 kg/m²      General: Well-appearing female in no acute distress   HEENT: EOMI, no scleral icterus is noted. No carotid bruit appreciated. CV: RRR  Pulmonary: No increased work or breathing is noted. CTA bilaterally. Abdomen: nondistended    Extremities: Warm and well perfused bilaterally. No significant BLE edema. Neuro: Cranial nerves II through XII are grossly intact   Integument: No ulcerations are identified visibly      Impression and Plan:  Jairo Ocampo is a 78 y.o. female with stable 3.5cm descending aortic aneurysm as well as 3 cm AAA. Imaging was reviewed and office today with patient. She does have chronic pain secondary to her spinal issues but denies any other pain or complaints. No CP. No abdominal pain. No claudication. Will repeat CT scan in 1 year for continued surveillance and she will f/u afterwards. Sooner as needed. Plan was discussed. Patient expresses understanding and agrees.         Liberty Billings

## 2019-10-21 NOTE — PROGRESS NOTES
1. Have you been to an emergency room or urgent care clinic since your last visit? yes  Hospitalized since your last visit? If yes, where, when, and reason for visit?   no  2. Have you seen or consulted any other health care providers outside of the [facility] since your last visit including any procedures, health maintenance items. If yes, where, when and reason for visit?

## 2020-02-27 RX ORDER — ALPRAZOLAM 0.25 MG/1
0.25 TABLET ORAL
COMMUNITY
End: 2022-06-20

## 2020-02-27 RX ORDER — ZINC GLUCONATE 10 MG
125 LOZENGE ORAL DAILY
COMMUNITY

## 2020-02-27 RX ORDER — CYCLOBENZAPRINE HCL 10 MG
5 TABLET ORAL
COMMUNITY

## 2020-02-27 RX ORDER — LOSARTAN POTASSIUM 100 MG/1
100 TABLET ORAL
COMMUNITY
Start: 2020-03-03 | End: 2020-03-03

## 2020-02-27 RX ORDER — FUROSEMIDE 20 MG/1
20 TABLET ORAL
COMMUNITY
End: 2021-10-07

## 2020-02-27 RX ORDER — PREDNISONE 5 MG/1
5 TABLET ORAL
COMMUNITY
End: 2020-03-03 | Stop reason: ALTCHOICE

## 2020-02-27 RX ORDER — LANOLIN ALCOHOL/MO/W.PET/CERES
2 CREAM (GRAM) TOPICAL
COMMUNITY

## 2020-02-27 RX ORDER — BIOTIN 1000 MCG
1 TABLET,CHEWABLE ORAL DAILY
COMMUNITY
End: 2021-10-07

## 2020-03-03 ENCOUNTER — OFFICE VISIT (OUTPATIENT)
Dept: PULMONOLOGY | Age: 70
End: 2020-03-03

## 2020-03-03 VITALS
SYSTOLIC BLOOD PRESSURE: 120 MMHG | BODY MASS INDEX: 31.01 KG/M2 | TEMPERATURE: 98.1 F | RESPIRATION RATE: 20 BRPM | HEIGHT: 63 IN | DIASTOLIC BLOOD PRESSURE: 70 MMHG | WEIGHT: 175 LBS | OXYGEN SATURATION: 96 % | HEART RATE: 66 BPM

## 2020-03-03 DIAGNOSIS — I71.40 ABDOMINAL AORTIC ANEURYSM (AAA) WITHOUT RUPTURE: ICD-10-CM

## 2020-03-03 DIAGNOSIS — G47.34 NOCTURNAL HYPOXIA: ICD-10-CM

## 2020-03-03 DIAGNOSIS — J30.9 ALLERGIC RHINITIS, UNSPECIFIED SEASONALITY, UNSPECIFIED TRIGGER: ICD-10-CM

## 2020-03-03 DIAGNOSIS — J44.9 CHRONIC OBSTRUCTIVE PULMONARY DISEASE, UNSPECIFIED COPD TYPE (HCC): Primary | ICD-10-CM

## 2020-03-03 RX ORDER — HYDROCODONE BITARTRATE AND ACETAMINOPHEN 5; 325 MG/1; MG/1
1 TABLET ORAL
COMMUNITY
Start: 2020-02-22

## 2020-03-03 RX ORDER — CHOLECALCIFEROL TAB 125 MCG (5000 UNIT) 125 MCG
5000 TAB ORAL DAILY
COMMUNITY

## 2020-03-03 RX ORDER — AMOXICILLIN AND CLAVULANATE POTASSIUM 875; 125 MG/1; MG/1
TABLET, FILM COATED ORAL
COMMUNITY
Start: 2020-02-26 | End: 2020-03-03 | Stop reason: ALTCHOICE

## 2020-03-03 NOTE — PROGRESS NOTES
[facility]  Pulmonary, Critical Care, and Sleep Medicine      Pulmonary Office Progress Notes    Name: Tera Montana     : 1950     Date: 3/3/2020        Subjective:     3/3/2020          HPI    Tera Montana  is a 78 y.o. female with PMH of nocturnal hypoxia who presents for routine follow up and oxygen qualification. She was last seen here on 19 for follow up regarding nocturnal hypoxia which was managed on 2L supplemental O2 HS. Today she appears comfortable, in no distress and reports that she has occasional SOB, only after excessive activity. She continues to use oxygen at night. She denies cough, wheezing, chest pain or hemoptysis. No fever, chills or orthopnea; no leg / calf pain or swelling and no decreased appetite or weight loss. She has some allergic rhinitis that is controlled with flonase PRN. She states she has not had to use her albuterol inhaler for several months. Her last PFTs were on 14 and demonstrated mild - moderate obstructive defect. She is a former smoker of cigarettes with a 45 pack year history and quit in . A six minute walk test was completed today and the patient does not meet the criteria for daily oxygen. She will continue to need supplemental O2 HS. Past Medical History:   Diagnosis Date    Arthritis     Chronic lung disease     Chronic obstructive pulmonary disease ([address]     Descending aortic aneurysm (HCC)     Fibromyalgia     Hypercholesterolemia     Hypertension     Ill-defined condition     Sleep apnea     mouth guard, oxygen HS       Allergies   Allergen Reactions    Benadryl [Diphenhydramine Hcl] Hives    Codeine Itching    Cymbalta [Duloxetine] Other (comments)     Shaking, feeling someone living inside my body, arms going numb.     Iodinated Contrast Media Hives     Pt states severe hives     Levaquin [Levofloxacin] Nausea Only    Lyrica [Pregabalin] Other (comments) Shaking, feeling someone living in my body, numb arm. Any meds for fibromyialga    Other Medication Swelling     Pt wrote having an allergic reaction to \"tetnus\". \"Extreme swelling at site\"     Sulfur Hives       Current Outpatient Medications   Medication Sig Dispense Refill    ALPRAZolam (XANAX) 0.25 mg tablet Take 0.25 mg by mouth.  biotin 1,000 mcg chew Take 1 Tab by mouth.  cyclobenzaprine (FLEXERIL) 10 mg tablet Take 5 mg by mouth.  furosemide (LASIX) 20 mg tablet Take 20 mg by mouth.  losartan (COZAAR) 100 mg tablet Take 100 mg by mouth.  magnesium 250 mg tab Take 125 mg by mouth.  niacin (SLO-NIACIN) 500 mg ER Tablet Take 2 Tabs by mouth.  predniSONE (DELTASONE) 5 mg tablet Take 5 mg by mouth.  OXYGEN-AIR DELIVERY SYSTEMS 2 L/min by Does Not Apply route nightly.  Flaxseed Oil oil by Does Not Apply route.  ergocalciferol (ERGOCALCIFEROL) 50,000 unit capsule Indications: vitamin D2  0    NIFEdipine ER (PROCARDIA XL) 30 mg ER tablet Take 30 mg by mouth daily.  atenolol (TENORMIN) 50 mg tablet Take 25 mg by mouth daily.  atorvastatin (LIPITOR) 40 mg tablet Take  by mouth daily.  pantoprazole (PROTONIX) 40 mg tablet Take 40 mg by mouth two (2) times a day.  zolpidem (AMBIEN) 10 mg tablet Take 5 mg by mouth nightly as needed for Sleep.  levocetirizine (XYZAL) 5 mg tablet Take  by mouth daily.  fluticasone (FLONASE) 50 mcg/actuation nasal spray two (2) times daily as needed.  montelukast (SINGULAIR) 10 mg tablet Take 10 mg by mouth nightly.  multivitamin (ONE A DAY) tablet Take 1 Tab by mouth daily.  calcium-cholecalciferol, d3, (CALCIUM 600 + D) 600-125 mg-unit tab Take  by mouth daily.  aspirin delayed-release 81 mg tablet Take  by mouth every other day.  VITAMIN B COMPLEX (B COMPLETE PO) Take  by mouth daily.  MAGNESIUM CARBONATE PO Take  by mouth daily.       potassium 99 mg tablet Take 45.5 mg by mouth daily. Review of Systems:    HEENT: No epistaxis, no nasal drainage, no difficulty in swallowing, no redness in eyes  Respiratory: As stated above in HPI  Cardiovascular: no chest pain, no palpitations, no chronic leg edema, no syncope  Gastrointestinal: no abd pain, no vomiting, no diarrhea, no bleeding symptoms  Genitourinary: No urinary symptoms or hematuria  Integument/breast: No ulcers or rashes  Musculoskeletal: No leg / calf pain  Neurological: No focal weakness, no seizures, no headaches  Behvioral/Psych: No anxiety, no depression  Constitutional: No fever, chills or night sweats. No decreased appetite or weight loss     Objective: There were no vitals taken for this visit. Visit Vitals  BP  120/70        (BP 1 Location: Right arm, BP Patient Position: Sitting)   Pulse 66   Temp   97.1   °F (36.8 °C) (Oral)   Resp 16   Ht 5' 3\" ( 1.626 m)   Wt   79.4  kg (  174  lb)   SpO2   95 %   BMI   31.00    kg/m²        PHYSICAL EXAM      General: Oriented to person, place, and time. Well-developed, well-nourished, and in no distress. Obese      Head:   Normocephalic, without obvious abnormality, atraumatic       Eyes:   Pupils reactive, conjunctivae / corneas clear. EOM's intact, no scleral icterus       Nose:   Nares normal, no drainage. Throat:    Lips, mucosa and tongue normal. Teeth and gums normal       Neck:   Supple, symmetrical, trachea midline. No adenopathy or thyroid swelling; no carotid bruit or JVD. CVS:    Regular rate and rhythm. S1S2 normal,  no murmurs       RS:      Symmetrical chest rise, good AE bilaterally. Lung sounds clear to auscultation bilaterally. No wheezing, rales or rhonchi, no accessory muscle use      Abd:     Soft, non-tender.   No hepatosplenomegaly                                                     Neuro:   non focal, awake, alert and oriented to person, place, time and situation    Extrm:   no leg edema,  clubbing or cyanosis       Skin:   no rash    Data review:     Hospital Outpatient Visit on 10/03/2019   Component Date Value Ref Range Status    LIPID PROFILE 10/03/2019        Final    Cholesterol, total 10/03/2019 147  <200 MG/DL Final    Triglyceride 10/03/2019 115  <150 MG/DL Final    Comment: The drugs N-acetylcysteine (NAC) and  Metamiszole have been found to cause falsely  low results in this chemical assay. Please  be sure to submit blood samples obtained  BEFORE administration of either of these  drugs to assure correct results.  HDL Cholesterol 10/03/2019 69* 40 - 60 MG/DL Final    LDL, calculated 10/03/2019 55  0 - 100 MG/DL Final    VLDL, calculated 10/03/2019 23  MG/DL Final    CHOL/HDL Ratio 10/03/2019 2.1  0 - 5.0   Final    Sodium 10/03/2019 143  136 - 145 mmol/L Final    Potassium 10/03/2019 4.8  3.5 - 5.5 mmol/L Final    Chloride 10/03/2019 107  100 - 111 mmol/L Final    CO2 10/03/2019 33* 21 - 32 mmol/L Final    Anion gap 10/03/2019 3  3.0 - 18 mmol/L Final    Glucose 10/03/2019 105* 74 - 99 mg/dL Final    BUN 10/03/2019 14  7.0 - 18 MG/DL Final    Creatinine 10/03/2019 1.14  0.6 - 1.3 MG/DL Final    BUN/Creatinine ratio 10/03/2019 12  12 - 20   Final    GFR est AA 10/03/2019 57* >60 ml/min/1.73m2 Final    GFR est non-AA 10/03/2019 47* >60 ml/min/1.73m2 Final    Comment: (NOTE)  Estimated GFR is calculated using the Modification of Diet in Renal   Disease (MDRD) Study equation, reported for both  Americans   (GFRAA) and non- Americans (GFRNA), and normalized to 1.73m2   body surface area. The physician must decide which value applies to   the patient. The MDRD study equation should only be used in   individuals age 25 or older. It has not been validated for the   following: pregnant women, patients with serious comorbid conditions,   or on certain medications, or persons with extremes of body size,   muscle mass, or nutritional status.       Calcium 10/03/2019 9.4  8.5 - 10.1 MG/DL Final    Bilirubin, total 10/03/2019 0.4  0.2 - 1.0 MG/DL Final    ALT (SGPT) 10/03/2019 22  13 - 56 U/L Final    AST (SGOT) 10/03/2019 30  10 - 38 U/L Final    Alk. phosphatase 10/03/2019 133* 45 - 117 U/L Final    Protein, total 10/03/2019 7.2  6.4 - 8.2 g/dL Final    Albumin 10/03/2019 4.0  3.4 - 5.0 g/dL Final    Globulin 10/03/2019 3.2  2.0 - 4.0 g/dL Final    A-G Ratio 10/03/2019 1.3  0.8 - 1.7   Final       PULMONARY FUNCTION TESTS     Date FVC FEV1  FEV1/FVC [id number] TLC RV RV/TLC VC DLCO   8/7/14 95% 81% 66 48% 98% 95% 97% 100% 17.10  79%                                                                                                  Imaging:  I have reviewed all of the available data including the patient's previous history external records and radiological imaging available for review. In addition, applicable cardiology and other lab data were also reviewed. XR Results (most recent):  Results from Hospital Encounter encounter on 11/14/17   [facility] X[facility] TECHNOLOGIST SERVICE    Narrative Fluoroscopy was provided for a bundled exam for documentation purposes. Impression IMPRESSION:    Please see above. FLUORO TIME: 00.06      [facility]       CT Results (most recent):  Results from Hospital Encounter encounter on 10/04/19   CT CHEST ABD PELV WO CONT    Narrative EXAMINATION: CT chest/abdomen/pelvis without contrast    INDICATION: Aortic aneurysm    COMPARISON: CT 9/28/2018    TECHNIQUE: CT of the chest, abdomen, and pelvis performed without contrast, with  multiplanar reformations. All CT scans at this facility are performed using dose  optimization technique as appropriate to a performed exam, to include automated  exposure control, adjustment of the mA and/or kV according to patient size  (including appropriate matching first site specific examinations), or use of  iterative reconstruction technique. FINDINGS:    Evaluation of soft tissues and vessels limited without vascular enhancement.     CT CHEST:    Cardiovascular: Moderate burden of atherosclerotic calcifications. Thoracic  aorta nonaneurysmal. Heart size normal.    Mediastinum: Imaged thyroid unremarkable. No adenopathy by size criteria. Esophagus nondistended. Pleura: No effusion or pneumothorax. Lungs/airways: No suspicious bronchial lesions. Scattered streaky densities  throughout the lungs, probably atelectasis/scarring. No consolidation. Miscellaneous: Superficial soft tissues unremarkable. Bones: No acute osseous findings. Impression IMPRESSION:    Hepatobiliary: No suspicious hepatic lesions. Gallbladder with subtle layering  sludge versus nonradiopaque cholelithiasis. No biliary duct dilatation. Pancreas: Unremarkable. Spleen: Unremarkable. Adrenal glands: Unremarkable. Genitourinary: Right kidney unremarkable. Left kidney unremarkable. Bladder  nondistended otherwise unremarkable. Uterus unremarkable. Gastrointestinal: Stomach unremarkable. Small bowel loops nondilated. The colon  is nondilated. Appendix not clearly visualized but there are no suspicious  findings in the right lower quadrant. Mesentery/vessels/nodes: No free air or free fluid. Overall moderate burden of  atherosclerotic calcifications. Mild fusiform aortic aneurysm at the hiatus  measuring 3.5 cm caliber. Additional infrarenal aorta mild fusiform aneurysmal  dilatation measuring roughly 2.8 cm caliber. Borderline aneurysmal right common  iliac artery measuring 1.5 cm caliber. 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 No adenopathy by size criteria. Miscellaneous: Superficial soft tissues unremarkable. Bones: No acute osseous findings. Lower lumbar advanced facet arthropathy. IMPRESSION:    Chest:  No acute findings. Atherosclerosis. Likely streaky atelectasis/scar in the  lungs.     Abdomen/pelvis:  -Mild fusiform aneurysmal dilatations of the abdominal aorta at the hiatus and  infrarenal aorta as above, without significant change since 9/28/2018.  -Possible gallbladder sludge versus cholelithiasis. 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 Patient Active Problem List   Diagnosis Code    Descending aortic aneurysm ([facility] [address] I71.9    Hypertension I10    Hypercholesterolemia E78.00    Arthritis M19.90    Incidental lung nodule, > 3mm and < 8mm R91.1    Bronchitis with chronic airway obstruction (HCC) J44.9    Cervical disc disease M50.90    Osteoarthritis of spine with radiculopathy, lumbar region M47.26    Neuropathy G62.9    Fibromyalgia M79.7    Spondylosis of lumbar region without myelopathy or radiculopathy M47.816    Lumbar facet arthropathy M47.816    Lumbar degenerative disc disease M51.36    Chronic pain syndrome G89.4    Spondylosis of cervical region without myelopathy or radiculopathy M47.812    Cervical facet syndrome M47.812    Degenerative disc disease, cervical M50.30       IMPRESSION:   · COPD - mild - moderate, well controlled without frequent exacerbations with Albuterol as needed  · Nocturnal hypoxia - currently on supplemental O2 @ 2LPM HS  · Allergic rhinitis  · Abdominal aortic aneurysm - stable        RECOMMENDATIONS:     · Continue  Singulair 10 mg daily. · Continue flonase PRN for allergic rhinitis            · Continue albuterol MDI every 4-6 hours as needed for increased shortness of breath, wheezing or cough - discussed appropriate use of rescue inhaler with patient  · Continue O2 @ 2 LPM HS  · Recommend healthy diet / weight / exercise as tolerated  · Repeat CT chest in October 2020  · Follow up in pulmonary clinic in  6 months  or sooner with worsening of symptoms  · Report to ER with chest pain or difficulty breathing.                Please note that this dictation was completed with [email or web address], the [employer] voice recognition software.   Quite often unanticipated grammatical, syntax, homophones, and other interpretive errors are inadvertently transcribed by the computer software. Please disregard these errors. Please excuse any errors that have escaped final proofreading.               Elsie Magana NP

## 2020-03-03 NOTE — PROGRESS NOTES
Pamela Carey presents today for   Chief Complaint   Patient presents with    COPD     follow up from 6/17/2019    Cough     Bronchitis    Allergic Rhinitis    Breathing Problem     nocturnal hypoxia    Results     CT 10/4/2019, CXR 8/19/2019 Lavell Dove)       Is someone accompanying this pt? No    Is the patient using any DME equipment during OV? No    -DME Company N/A    Depression Screening:  3 most recent PHQ Screens 3/3/2020   Little interest or pleasure in doing things Not at all   Feeling down, depressed, irritable, or hopeless Not at all   Total Score PHQ 2 0       Learning Assessment:  Learning Assessment 10/11/2018   PRIMARY LEARNER Patient   CO-LEARNER CAREGIVER -   PRIMARY LANGUAGE ENGLISH   LEARNER PREFERENCE PRIMARY LISTENING   ANSWERED BY patient   RELATIONSHIP SELF       Abuse Screening:  Abuse Screening Questionnaire 3/3/2020   Do you ever feel afraid of your partner? N   Are you in a relationship with someone who physically or mentally threatens you? N   Is it safe for you to go home? Y       Fall Risk  Fall Risk Assessment, last 12 mths 3/3/2020   Able to walk? Yes   Fall in past 12 months? No         Coordination of Care:  1. Have you been to the ER, urgent care clinic since your last visit? Hospitalized since your last visit? Yes; Where: [facility] ED, When: 8/19/2019-headache, malaise, nausea & lightheadedness    2. Have you seen or consulted any other health care providers outside of the [address] [place] [address] since your last visit? Include any pap smears or colon screening. Yes.  Dr. Eloy Castillo, PCP, Dr. Alexander Jacobson,  cardiologist

## 2020-03-03 NOTE — PROGRESS NOTES
Six Minute Walk Test (6MWT) recording form    Nevin Carroll       [id number]                                    1950 female  [id number]    [x]  Medical history checked  [x]  Medical clearance provided for the patient to participate in exercise testing      Contraindications to 6MWT:  [] Resting heart rate > 120 beats / min after 10 minutes rest (relative contraindication)  [] Systolic blood pressure > 180 mm Hg +/- diastolic blood pressure > 100 mm Hg (relative contraindication)  [] Resting SpO2 < 85% on room air or on prescribed level of supplemental oxygen  [] Physical disability preventing safe performance  [x] No contraindications identified             6MWT        3/3/2020  10:36 AM       Supplemental Oxygen  Mobility Aid - None       Time Mins BP SP02 HR RR Distance Walked SOB/Rests/Comments   Rest 120/70 96 66 20  SOB - 4  Room Air at rest     1  97 79  68 meters SOB - 4  Room Air     2  92 83  68 meters SOB - 4  Room Air     3  91 85  68 meters SOB - 5  Room Air     4  92 85  68 meters SOB - 5  Room Air     5  92 86  68 meters SOB - 5  Room Air     6  92 87  68 meters SOB - 5  Room Air     Recovery 1 120/70 92 81 22  SOB - 5  Room Air       Recovery 2 120/70 96 66 20  SOB - 4  Room Air       Total distance:         408 meters     Symptom recovery:  30 seconds       HR recovery:  1.5 minutes                                                Limiting factor:      None                                        Was test terminated: [x] No   [] Yes  If yes, when? 6MWT Termination Criteria:  [] Chest pain or angina-like symptons  [] Heart rate > Predicted HR max.   [] Evolving mental confusioin, light-headedness or incoordination  [] Physical or verbal severe fatigue [] Intolerable dyspnea, unrelieved by rest  [] Persistent SP02 < 85% (Note pending clinical presentation)  [] Abnormal gait pattern (leg cramps, staggering, ataxia)  [] Other clinically warranted reason     INTERPRETATION:            Comparision 6 min walk distance:      RECOMMENDATION:            Latha Pérez, RT

## 2020-05-14 ENCOUNTER — TELEPHONE (OUTPATIENT)
Dept: PULMONOLOGY | Age: 70
End: 2020-05-14

## 2020-05-14 DIAGNOSIS — G47.34 NOCTURNAL HYPOXIA: ICD-10-CM

## 2020-05-14 DIAGNOSIS — J44.9 CHRONIC OBSTRUCTIVE PULMONARY DISEASE, UNSPECIFIED COPD TYPE (HCC): Primary | ICD-10-CM

## 2020-06-30 ENCOUNTER — OFFICE VISIT (OUTPATIENT)
Dept: PULMONOLOGY | Age: 70
End: 2020-06-30

## 2020-06-30 VITALS
RESPIRATION RATE: 18 BRPM | OXYGEN SATURATION: 96 % | WEIGHT: 178.8 LBS | SYSTOLIC BLOOD PRESSURE: 134 MMHG | DIASTOLIC BLOOD PRESSURE: 77 MMHG | HEART RATE: 67 BPM | BODY MASS INDEX: 31.68 KG/M2 | TEMPERATURE: 98.2 F | HEIGHT: 63 IN

## 2020-06-30 DIAGNOSIS — J44.9 BRONCHITIS WITH CHRONIC AIRWAY OBSTRUCTION (HCC): ICD-10-CM

## 2020-06-30 DIAGNOSIS — G47.34 NOCTURNAL HYPOXEMIA: Primary | ICD-10-CM

## 2020-06-30 DIAGNOSIS — R91.1 INCIDENTAL LUNG NODULE, > 3MM AND < 8MM: ICD-10-CM

## 2020-06-30 NOTE — PROGRESS NOTES
[facility]  Pulmonary, Critical Care, and Sleep Medicine      Pulmonary Office visit. Name: Dhiraj Steinberg     : 1950     Date: 2020        Subjective:     Patient is a 78 y.o. female is here for follow up-evaluation for need for nocturnal Oxygen supplementation. evaluation of an abnormal Ct scan of chest.    20     Patient feels well and denies any cough ,chest tightness, wheezing. Has some dry cough which occurs randomly but subsides  She had SOB with activity but no PND or orthopnea. No fever or chills. Using night time O2  Wears oral piece for Sleep apnea  Had titration study completed- results suggest nocturnal Oxygen desaturation despite CPAP 9 cm. Needs 2 L Oxygen in addition. On Singulair, flonase. She has not had follow up Ct scan yet. She also has h/o allergies and is receiving allergy immunotherapy by Dr. Lamont Hall. HPI:  Followed from 2014 To 10/2014. Most recent follow up Ct scan 2015 with stability in nodule. She had 24 month follow up CT completed and is here to discuss results. She also had follow up Sleep evaluation- Dr. Garrison Benton who reports nocturnal hypoxemia on initial study and follow up titration study. Patient was asked to follow up with Pulmonology. She has smoked less than 1 ppd for past 47+ years and eventually quit smoking in   She denies any sleep related problems, leg swelling. Denies joint pains. 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 H/o rheumatic fever in childhood.      Past Medical History:   Diagnosis Date    Arthritis     Chronic lung disease     Chronic obstructive pulmonary disease ([address]     Descending aortic aneurysm ([address]     Fibromyalgia     Hypercholesterolemia     Hypertension     Ill-defined condition     Sleep apnea     mouth guard, oxygen HS       Past Surgical History:   Procedure Laterality Date    COLONOSCOPY N/A 7/3/2017    COLONOSCOPY with biopsies performed by Allie Fernandez MD at [facility]    HX APPENDECTOMY      HX BREAST BIOPSY Left     HX CATARACT REMOVAL      HX TUBAL LIGATION       Allergies   Allergen Reactions    Benadryl [Diphenhydramine Hcl] Hives    Codeine Itching    Cymbalta [Duloxetine] Other (comments)     Shaking, feeling someone living inside my body, arms going numb.  Iodinated Contrast Media Hives     Pt states severe hives     Levaquin [Levofloxacin] Nausea Only    Lyrica [Pregabalin] Other (comments)     Shaking, feeling someone living in my body, numb arm. Any meds for fibromyialga    Other Medication Swelling     Pt wrote having an allergic reaction to \"tetnus\". \"Extreme swelling at site\"     Sulfur Hives     Current Outpatient Medications   Medication Sig Dispense Refill    HYDROcodone-acetaminophen (NORCO) 5-325 mg per tablet Take 1 Tab by mouth every eight (8) hours as needed.  cholecalciferol (VITAMIN D3) (5000 Units/125 mcg) tab tablet Take 5,000 Units by mouth daily.  ALPRAZolam (XANAX) 0.25 mg tablet Take 0.25 mg by mouth nightly as needed.  biotin 1,000 mcg chew Take 1 Tab by mouth daily.  cyclobenzaprine (FLEXERIL) 10 mg tablet Take 5 mg by mouth three (3) times daily as needed.  furosemide (LASIX) 20 mg tablet Take 20 mg by mouth daily as needed.  magnesium 250 mg tab Take 125 mg by mouth daily.  niacin (SLO-NIACIN) 500 mg ER Tablet Take 2 Tabs by mouth nightly.  OXYGEN-AIR DELIVERY SYSTEMS 2 L/min by Does Not Apply route nightly. Indications: DME: [facility]      Flaxseed Oil oil 1 Tab by Does Not Apply route daily.  NIFEdipine ER (PROCARDIA XL) 30 mg ER tablet Take 30 mg by mouth daily.  atenolol (TENORMIN) 50 mg tablet Take 25 mg by mouth daily.  atorvastatin (LIPITOR) 40 mg tablet Take 80 mg by mouth daily.  pantoprazole (PROTONIX) 40 mg tablet Take 40 mg by mouth two (2) times a day.       zolpidem (AMBIEN) 10 mg tablet Take 5 mg by mouth nightly as needed for Sleep.  levocetirizine (XYZAL) 5 mg tablet Take 5 mg by mouth daily.  fluticasone (FLONASE) 50 mcg/actuation nasal spray 2 Sprays by Both Nostrils route two (2) times daily as needed.  montelukast (SINGULAIR) 10 mg tablet Take 10 mg by mouth nightly.  multivitamin (ONE A DAY) tablet Take 1 Tab by mouth daily.  calcium-cholecalciferol, d3, (CALCIUM 600 + D) 600-125 mg-unit tab Take  by mouth daily.  aspirin delayed-release 81 mg tablet Take 81 mg by mouth every other day.  VITAMIN B COMPLEX (B COMPLETE PO) Take 1 Tab by mouth daily.  potassium 99 mg tablet Take 45.5 mg by mouth daily.        Review of Systems:  HEENT: No epistaxis, no nasal drainage, no difficulty in swallowing, no redness in eyes  Respiratory: as above  Cardiovascular: no chest pain, no palpitations, no chronic leg edema, no syncope  Gastrointestinal: no abd pain, no vomiting, no diarrhea, no bleeding symptoms  Genitourinary: No urinary symptoms or hematuria  Integument/breast: No ulcers or rashes  Musculoskeletal:Neg  Neurological: No focal weakness, no seizures, no headaches  Behvioral/Psych: No anxiety, no depression  Constitutional: No fever, no chills, no weight loss, no night sweats     Objective:     Visit Vitals  /77 (BP 1 Location: Right arm, BP Patient Position: Sitting)   Pulse 67   Temp 98.2 °F (36.8 °C) (Oral)   Resp 18   Ht 5' 3\" (1.6 m)   Wt 81.1 kg (178 lb 12.8 oz)   SpO2 96%   BMI 31.67 kg/m²        Physical Exam:   General: comfortable, no acute distress  HEENT: pupils reactive, sclera anicteric, EOM intact  Neck: No adenopathy or thyroid swelling, no lymphadenopathy or JVD, supple  CVS: S1S2 no murmurs  RS: Mod AE bilaterally, no tactile fremitus or egophony, no accessory muscle use  Abd: soft, non tender, no hepatosplenomegaly  Neuro: non focal, awake, alert  Extrm: no leg edema, clubbing or cyanosis  Skin: no rash    Data review:   PFT's:  Flows:  Maximal Mid Expiratory Flow rate is reduced to 48 % predicted  Forced Expiratory Volume in one second is normal  FEV 1% is reduced  Volumes:  Normal Volumes  Flow Volume Loop:  Reduced Terminal Flows in the Flow Volume Loop  Bronchodilator:  Significant improvement with bronchodilator  Diffusion:  Low Normal Diffusion Capacity  Impression:  Mild to Moderate obstructive defect, predominately small airways    Imaging:  I have personally reviewed the patients radiographs and have reviewed the reports:  CT Results (most recent):  Results from Hospital Encounter encounter on 10/04/19   CT CHEST ABD PELV WO CONT    Narrative EXAMINATION: CT chest/abdomen/pelvis without contrast    INDICATION: Aortic aneurysm    COMPARISON: CT 9/28/2018    TECHNIQUE: CT of the chest, abdomen, and pelvis performed without contrast, with  multiplanar reformations. All CT scans at this facility are performed using dose  optimization technique as appropriate to a performed exam, to include automated  exposure control, adjustment of the mA and/or kV according to patient size  (including appropriate matching first site specific examinations), or use of  iterative reconstruction technique. FINDINGS:    Evaluation of soft tissues and vessels limited without vascular enhancement. CT CHEST:    Cardiovascular: Moderate burden of atherosclerotic calcifications. Thoracic  aorta nonaneurysmal. Heart size normal.    Mediastinum: Imaged thyroid unremarkable. No adenopathy by size criteria. Esophagus nondistended. Pleura: No effusion or pneumothorax. Lungs/airways: No suspicious bronchial lesions. Scattered streaky densities  throughout the lungs, probably atelectasis/scarring. No consolidation. Miscellaneous: Superficial soft tissues unremarkable. Bones: No acute osseous findings. Impression IMPRESSION:    Hepatobiliary: No suspicious hepatic lesions. Gallbladder with subtle layering  sludge versus nonradiopaque cholelithiasis.  No biliary duct dilatation. Pancreas: Unremarkable. Spleen: Unremarkable. Adrenal glands: Unremarkable. Genitourinary: Right kidney unremarkable. Left kidney unremarkable. Bladder  nondistended otherwise unremarkable. Uterus unremarkable. Gastrointestinal: Stomach unremarkable. Small bowel loops nondilated. The colon  is nondilated. Appendix not clearly visualized but there are no suspicious  findings in the right lower quadrant. Mesentery/vessels/nodes: No free air or free fluid. Overall moderate burden of  atherosclerotic calcifications. Mild fusiform aortic aneurysm at the hiatus  measuring 3.5 cm caliber. Additional infrarenal aorta mild fusiform aneurysmal  dilatation measuring roughly 2.8 cm caliber. Borderline aneurysmal right common  iliac artery measuring 1.5 cm caliber. 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 No adenopathy by size criteria. Miscellaneous: Superficial soft tissues unremarkable. Bones: No acute osseous findings. Lower lumbar advanced facet arthropathy. IMPRESSION:    Chest:  No acute findings. Atherosclerosis. Likely streaky atelectasis/scar in the  lungs. Abdomen/pelvis:  -Mild fusiform aneurysmal dilatations of the abdominal aorta at the hiatus and  infrarenal aorta as above, without significant change since 9/28/2018.  -Possible gallbladder sludge versus cholelithiasis. 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 CT scan 10/2017:  6 mm right upper lobe nodule against the minor fissure is stable over the  last 3 years, benign    CT scan of chest ( [facility]-) 5/2016. LUNGS:  Nodules:  -5 mm perifissural nodule along the right minor fissure (image 131), solid circumscribed, likely an intrapulmonary lymph node. -5 mm groundglass nodule right lung apex (image 56). Other pulmonary findings:  Patchy coarse reticular opacification in the mid and lower lungs, scarring and/or subsegmental atelectasis.  Few regions of mild cylindrical bronchiectasis. OTHER:   No significant incidental findings. CT scan 7/2015:  Stable pulmonary nodule along the minor fissure and stable borderline  precarinal mediastinal lymph node since 9/2014. Ct scan of chest:6/2014:C  There is a 0.5 x 0.4 cm noncalcified ovoid density in the right upper lobe   inferior aspect abutting the minor fissure (image #32). No dominant lung mass   is detected. Subsegmental atelectatic changes are identified in the right   middle lobe and the right lower lobe. Less pronounced subtle subsegmental   atelectatic changes also identified in the lingular region and left lung base. No focal infiltrate or consolidation is observed. Ct scan of abd/chest- 9/2014;  1. Stable right pulmonary nodule. 2. The descending thoracic aortic aneurysm is stable. Ectasia of the ascending   aorta is stable. 3. No evidence for bowel obstruction or inflammation. Inspissated stool in the   descending colon could be the result of constipation. 4. 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 2-D echo:  Left ventricle: Size was normal. Systolic function was normal by EF  (biplane method of disks). Ejection fraction was estimated to be 55 %. There were no regional wall motion abnormalities. Wall thickness was  normal. Doppler parameters were consistent with abnormal left ventricular  relaxation (grade 1 diastolic dysfunction). Right ventricle: Systolic pressure was mildly increased. Estimated peak  pressure was 35 mmHg. Left atrium: The atrium was mildly dilated. Mitral valve: There was mild annular calcification. Tricuspid valve: There was trace to mild regurgitation. Pulmonary arteries: Systolic pressure was mildly increased.       IMPRESSION:     · Bronchitis- acute on chronic with bronchospasm resolved exacerbation and stable at baseline state- Asthma and/or COPD and obstructive features, also with some radiologic findings consistent with chronic atelectasis- asymptomatic  · Nocturnal hypoxemia-most recent overnight oximetry continues to show nocturnal oxygen desaturation needing supplemental oxygen  · Lung nodule- incidental finding of 0.5x0.4 cm RUL nodule in a patient who has been a smoker 52 PPD. High risk for malignancy and needs appropriate follow up. 24 month stability demonstrated. Discussed report with patient. Need to follow up annually per USTFP recommendations- low dose lung CT screening protocol or can accept Ct done for evaluation of Aneurysm. 6 mm right upper lobe nodule against the minor fissure -stable  · Mild pulmonary HTN   · Allergic rhinitis controlled on Flonase and allergy injections  · Esophageal stricture- s/p dilatation  And h/o hiatal hernia ? GERD with silent aspiration as cause for subtle basal interstitial fibrotic changes  · HTN  · Fusiform aneurysm of the distal descending aorta- vascular surgery following  · hyperlipidemia      RECOMMENDATIONS:   Will follow according to Fleischner society guidelines  Continue Nocturnal oxygen supplementation based on documented Oxygen desaturation during sleep despite LESLIE corrective devices including CPAP. Qualifying study completed on 6/3/2020  This patient has hypoxia related clinical diagnosis that will improve with oxygen therapy. Other treatment measures have been tried and have been ineffective.   Continue seasonal LABA-ICS and prn JOBY  Continue prn albuterol  Antireflux therapy and continued interventions- dietary and medications  Maintain smoking cessation- congratulated on success  Preventive vaccinations  Will follow up   Questions and concerns addressed          Sara Dodson MD

## 2020-06-30 NOTE — PROGRESS NOTES
Cisco Mccallum presents today for   Chief Complaint   Patient presents with    COPD     CT 6/17/20.  Sleep Apnea       Is someone accompanying this pt? No    Is the patient using any DME equipment during OV? O2 @ home. -[facility]. Depression Screening:  3 most recent PHQ Screens 6/30/2020   Little interest or pleasure in doing things Not at all   Feeling down, depressed, irritable, or hopeless Not at all   Total Score PHQ 2 0       Learning Assessment:  Learning Assessment 10/11/2018   PRIMARY LEARNER Patient   CO-LEARNER CAREGIVER -   PRIMARY LANGUAGE ENGLISH   LEARNER PREFERENCE PRIMARY LISTENING   ANSWERED BY patient   RELATIONSHIP SELF       Abuse Screening:  Abuse Screening Questionnaire 3/3/2020   Do you ever feel afraid of your partner? N   Are you in a relationship with someone who physically or mentally threatens you? N   Is it safe for you to go home? Y       Fall Risk  Fall Risk Assessment, last 12 mths 6/30/2020   Able to walk? Yes   Fall in past 12 months? No         Coordination of Care:  1. Have you been to the ER, urgent care clinic since your last visit? Hospitalized since your last visit? No    2. Have you seen or consulted any other health care providers outside of the [address] [place] [address] since your last visit? Include any pap smears or colon screening.  No.

## 2020-07-15 ENCOUNTER — HOSPITAL ENCOUNTER (OUTPATIENT)
Dept: LAB | Age: 70
Discharge: HOME | End: 2020-07-15
Payer: MEDICARE

## 2020-07-15 DIAGNOSIS — Z86.39 H/O: HYPOTHYROIDISM: ICD-10-CM

## 2020-07-15 DIAGNOSIS — I10 ESSENTIAL HYPERTENSION, BENIGN: ICD-10-CM

## 2020-07-15 DIAGNOSIS — E78.2 MIXED HYPERLIPIDEMIA: ICD-10-CM

## 2020-07-15 DIAGNOSIS — E55.9 VITAMIN D INSUFFICIENCY: ICD-10-CM

## 2020-07-15 LAB
25(OH)D3 SERPL-MCNC: 45.1 NG/ML (ref 30–100)
ALBUMIN SERPL-MCNC: 4 G/DL (ref 3.4–5)
ALBUMIN/GLOB SERPL: 1.3 {RATIO} (ref 0.8–1.7)
ALP SERPL-CCNC: 107 U/L (ref 45–117)
ALT SERPL-CCNC: 27 U/L (ref 13–56)
ANION GAP SERPL CALC-SCNC: 8 MMOL/L (ref 3–18)
AST SERPL-CCNC: 29 U/L (ref 10–38)
BASOPHILS # BLD: 0 K/UL (ref 0–0.1)
BASOPHILS NFR BLD: 0 % (ref 0–2)
BILIRUB SERPL-MCNC: 0.6 MG/DL (ref 0.2–1)
BUN SERPL-MCNC: 16 MG/DL (ref 7–18)
BUN/CREAT SERPL: 16 (ref 12–20)
CALCIUM SERPL-MCNC: 8.6 MG/DL (ref 8.5–10.1)
CHLORIDE SERPL-SCNC: 103 MMOL/L (ref 100–111)
CHOLEST SERPL-MCNC: 150 MG/DL
CO2 SERPL-SCNC: 30 MMOL/L (ref 21–32)
CREAT SERPL-MCNC: 1.01 MG/DL (ref 0.6–1.3)
DIFFERENTIAL METHOD BLD: ABNORMAL
EOSINOPHIL # BLD: 0.3 K/UL (ref 0–0.4)
EOSINOPHIL NFR BLD: 5 % (ref 0–5)
ERYTHROCYTE [DISTWIDTH] IN BLOOD BY AUTOMATED COUNT: 14.4 % (ref 11.6–14.5)
GLOBULIN SER CALC-MCNC: 3 G/DL (ref 2–4)
GLUCOSE SERPL-MCNC: 95 MG/DL (ref 74–99)
HCT VFR BLD AUTO: 37.9 % (ref 35–45)
HDLC SERPL-MCNC: 59 MG/DL (ref 40–60)
HDLC SERPL: 2.5 {RATIO} (ref 0–5)
HGB BLD-MCNC: 12.3 G/DL (ref 12–16)
LDLC SERPL CALC-MCNC: 53.4 MG/DL (ref 0–100)
LIPID PROFILE,FLP: ABNORMAL
LYMPHOCYTES # BLD: 1.9 K/UL (ref 0.9–3.6)
LYMPHOCYTES NFR BLD: 28 % (ref 21–52)
MCH RBC QN AUTO: 30.4 PG (ref 24–34)
MCHC RBC AUTO-ENTMCNC: 32.5 G/DL (ref 31–37)
MCV RBC AUTO: 93.8 FL (ref 74–97)
MONOCYTES # BLD: 0.8 K/UL (ref 0.05–1.2)
MONOCYTES NFR BLD: 12 % (ref 3–10)
NEUTS SEG # BLD: 3.7 K/UL (ref 1.8–8)
NEUTS SEG NFR BLD: 55 % (ref 40–73)
PLATELET # BLD AUTO: 199 K/UL (ref 135–420)
PMV BLD AUTO: 10.9 FL (ref 9.2–11.8)
POTASSIUM SERPL-SCNC: 3.5 MMOL/L (ref 3.5–5.5)
PROT SERPL-MCNC: 7 G/DL (ref 6.4–8.2)
RBC # BLD AUTO: 4.04 M/UL (ref 4.2–5.3)
SODIUM SERPL-SCNC: 141 MMOL/L (ref 136–145)
TRIGL SERPL-MCNC: 188 MG/DL (ref ?–150)
TSH SERPL DL<=0.05 MIU/L-ACNC: 1.23 UIU/ML (ref 0.36–3.74)
VLDLC SERPL CALC-MCNC: 37.6 MG/DL
WBC # BLD AUTO: 6.7 K/UL (ref 4.6–13.2)

## 2020-07-15 PROCEDURE — 80053 COMPREHEN METABOLIC PANEL: CPT

## 2020-07-15 PROCEDURE — 84443 ASSAY THYROID STIM HORMONE: CPT

## 2020-07-15 PROCEDURE — 80061 LIPID PANEL: CPT

## 2020-07-15 PROCEDURE — 82306 VITAMIN D 25 HYDROXY: CPT

## 2020-07-15 PROCEDURE — 36415 COLL VENOUS BLD VENIPUNCTURE: CPT

## 2020-07-15 PROCEDURE — 85025 COMPLETE CBC W/AUTO DIFF WBC: CPT

## 2020-09-16 ENCOUNTER — HOSPITAL ENCOUNTER (OUTPATIENT)
Dept: GENERAL RADIOLOGY | Age: 70
Discharge: HOME | End: 2020-09-16
Payer: MEDICARE

## 2020-09-16 DIAGNOSIS — M54.50 LOW BACK PAIN: ICD-10-CM

## 2020-09-16 PROCEDURE — 72120 X-RAY BEND ONLY L-S SPINE: CPT

## 2020-10-19 ENCOUNTER — HOSPITAL ENCOUNTER (OUTPATIENT)
Dept: CT IMAGING | Age: 70
Discharge: HOME | End: 2020-10-19
Attending: PHYSICIAN ASSISTANT
Payer: MEDICARE

## 2020-10-19 DIAGNOSIS — M47.816 SPONDYLOSIS OF LUMBAR REGION WITHOUT MYELOPATHY OR RADICULOPATHY: ICD-10-CM

## 2020-10-19 DIAGNOSIS — I71.9 DESCENDING AORTIC ANEURYSM (HCC): ICD-10-CM

## 2020-10-19 DIAGNOSIS — I71.40 ABDOMINAL AORTIC ANEURYSM (AAA) WITHOUT RUPTURE: ICD-10-CM

## 2020-10-19 PROCEDURE — 74176 CT ABD & PELVIS W/O CONTRAST: CPT

## 2020-10-28 ENCOUNTER — TRANSCRIBE ORDER (OUTPATIENT)
Dept: SCHEDULING | Age: 70
End: 2020-10-28

## 2020-10-28 ENCOUNTER — OFFICE VISIT (OUTPATIENT)
Dept: VASCULAR SURGERY | Age: 70
End: 2020-10-28
Payer: MEDICARE

## 2020-10-28 VITALS
RESPIRATION RATE: 20 BRPM | SYSTOLIC BLOOD PRESSURE: 112 MMHG | DIASTOLIC BLOOD PRESSURE: 72 MMHG | HEART RATE: 50 BPM | OXYGEN SATURATION: 93 %

## 2020-10-28 DIAGNOSIS — I71.40 ABDOMINAL AORTIC ANEURYSM (AAA) WITHOUT RUPTURE: ICD-10-CM

## 2020-10-28 DIAGNOSIS — M47.816 SPONDYLOSIS OF LUMBAR REGION WITHOUT MYELOPATHY OR RADICULOPATHY: ICD-10-CM

## 2020-10-28 DIAGNOSIS — I71.9 DESCENDING AORTIC ANEURYSM (HCC): Primary | ICD-10-CM

## 2020-10-28 DIAGNOSIS — Z12.31 VISIT FOR SCREENING MAMMOGRAM: Primary | ICD-10-CM

## 2020-10-28 PROCEDURE — G8752 SYS BP LESS 140: HCPCS | Performed by: PHYSICIAN ASSISTANT

## 2020-10-28 PROCEDURE — 1090F PRES/ABSN URINE INCON ASSESS: CPT | Performed by: PHYSICIAN ASSISTANT

## 2020-10-28 PROCEDURE — G8427 DOCREV CUR MEDS BY ELIG CLIN: HCPCS | Performed by: PHYSICIAN ASSISTANT

## 2020-10-28 PROCEDURE — 1101F PT FALLS ASSESS-DOCD LE1/YR: CPT | Performed by: PHYSICIAN ASSISTANT

## 2020-10-28 PROCEDURE — G8754 DIAS BP LESS 90: HCPCS | Performed by: PHYSICIAN ASSISTANT

## 2020-10-28 PROCEDURE — G8432 DEP SCR NOT DOC, RNG: HCPCS | Performed by: PHYSICIAN ASSISTANT

## 2020-10-28 PROCEDURE — G9899 SCRN MAM PERF RSLTS DOC: HCPCS | Performed by: PHYSICIAN ASSISTANT

## 2020-10-28 PROCEDURE — G8417 CALC BMI ABV UP PARAM F/U: HCPCS | Performed by: PHYSICIAN ASSISTANT

## 2020-10-28 PROCEDURE — G8399 PT W/DXA RESULTS DOCUMENT: HCPCS | Performed by: PHYSICIAN ASSISTANT

## 2020-10-28 PROCEDURE — 3017F COLORECTAL CA SCREEN DOC REV: CPT | Performed by: PHYSICIAN ASSISTANT

## 2020-10-28 PROCEDURE — G8536 NO DOC ELDER MAL SCRN: HCPCS | Performed by: PHYSICIAN ASSISTANT

## 2020-10-28 PROCEDURE — 99213 OFFICE O/P EST LOW 20 MIN: CPT | Performed by: PHYSICIAN ASSISTANT

## 2020-10-28 NOTE — PROGRESS NOTES
Edwar Olson    Chief Complaint   Patient presents with    Abdominal Aortic Aneurysm       History and Physical    Edwar Olson is a 69 y.o. female who presents today for her one year f/u for a descending aortic aneurysm and AAA. She is doing well and is without complaint in the office today. She does have some chronic back pain but denies any new pain outside of this. She denies any abdominal.  No chest pain. No fever/chills. She does report that she has had some fogginess to her vision and states that she has to undergo a surgical procedure for cataracts. Otherwise she denies any new medical diagnoses over the past year and no hospital admissions. She did have follow-up CT scan which was done without contrast due to severe allergic reaction to IV contrast dye. Imaging showed moderate to advanced atherosclerotic aortic disease. Stable aneurysms in distal descending (3.6x3.5cm) and mid/distal abdominal aorta (3.1cm).      Past Medical History:   Diagnosis Date    Arthritis     Chronic lung disease     Chronic obstructive pulmonary disease ([address]     Descending aortic aneurysm ([address]     Fibromyalgia     Hypercholesterolemia     Hypertension     Ill-defined condition     Sleep apnea     mouth guard, oxygen HS     Past Surgical History:   Procedure Laterality Date    COLONOSCOPY N/A 7/3/2017    COLONOSCOPY with biopsies performed by Crystal Burroughs MD at [facility]    HX APPENDECTOMY      HX BREAST BIOPSY Left     HX CATARACT REMOVAL      HX TUBAL LIGATION       Patient Active Problem List   Diagnosis Code    Descending aortic aneurysm ([address] I71.9    Hypertension I10    Hypercholesterolemia E78.00    Arthritis M19.90    Incidental lung nodule, > 3mm and < 8mm R91.1    Bronchitis with chronic airway obstruction (HCC) J44.9    Cervical disc disease M50.90    Osteoarthritis of spine with radiculopathy, lumbar region M47.26    Neuropathy G62.9    Fibromyalgia M79.7    Spondylosis of lumbar region without myelopathy or radiculopathy M47.816    Lumbar facet arthropathy M47.816    Lumbar degenerative disc disease M51.36    Chronic pain syndrome G89.4    Spondylosis of cervical region without myelopathy or radiculopathy M47.812    Cervical facet syndrome M47.812    Degenerative disc disease, cervical M50.30    Nocturnal hypoxemia G47.34     Current Outpatient Medications   Medication Sig Dispense Refill    HYDROcodone-acetaminophen (NORCO) 5-325 mg per tablet Take 1 Tab by mouth every eight (8) hours as needed.  cholecalciferol (VITAMIN D3) (5000 Units/125 mcg) tab tablet Take 5,000 Units by mouth daily.  ALPRAZolam (XANAX) 0.25 mg tablet Take 0.25 mg by mouth nightly as needed.  biotin 1,000 mcg chew Take 1 Tab by mouth daily.  cyclobenzaprine (FLEXERIL) 10 mg tablet Take 5 mg by mouth three (3) times daily as needed.  furosemide (LASIX) 20 mg tablet Take 20 mg by mouth daily as needed.  magnesium 250 mg tab Take 125 mg by mouth daily.  niacin (SLO-NIACIN) 500 mg ER Tablet Take 2 Tabs by mouth nightly.  OXYGEN-AIR DELIVERY SYSTEMS 2 L/min by Does Not Apply route nightly. Indications: DME: [facility]      Flaxseed Oil oil 1 Tab by Does Not Apply route daily.  NIFEdipine ER (PROCARDIA XL) 30 mg ER tablet Take 30 mg by mouth daily.  atenolol (TENORMIN) 50 mg tablet Take 25 mg by mouth daily.  atorvastatin (LIPITOR) 40 mg tablet Take 80 mg by mouth daily.  pantoprazole (PROTONIX) 40 mg tablet Take 40 mg by mouth two (2) times a day.  zolpidem (AMBIEN) 10 mg tablet Take 5 mg by mouth nightly as needed for Sleep.  levocetirizine (XYZAL) 5 mg tablet Take 5 mg by mouth daily.  fluticasone (FLONASE) 50 mcg/actuation nasal spray 2 Sprays by Both Nostrils route two (2) times daily as needed.  montelukast (SINGULAIR) 10 mg tablet Take 10 mg by mouth nightly.       multivitamin (ONE A DAY) tablet Take 1 Tab by mouth daily.      calcium-cholecalciferol, d3, (CALCIUM 600 + D) 600-125 mg-unit tab Take  by mouth daily.  aspirin delayed-release 81 mg tablet Take 81 mg by mouth every other day.  VITAMIN B COMPLEX (B COMPLETE PO) Take 1 Tab by mouth daily.  potassium 99 mg tablet Take 45.5 mg by mouth daily. Allergies   Allergen Reactions    Benadryl [Diphenhydramine Hcl] Hives    Codeine Itching    Cymbalta [Duloxetine] Other (comments)     Shaking, feeling someone living inside my body, arms going numb.  Iodinated Contrast Media Hives     Pt states severe hives     Levaquin [Levofloxacin] Nausea Only    Lyrica [Pregabalin] Other (comments)     Shaking, feeling someone living in my body, numb arm. Any meds for fibromyialga    Other Medication Swelling     Pt wrote having an allergic reaction to \"tetnus\". \"Extreme swelling at site\"     Sulfur Hives       Physical Exam:    Visit Vitals  /72 (BP 1 Location: Left arm, BP Patient Position: Sitting)   Pulse (!) 50   Resp 20   SpO2 93%      General: Well-appearing female in no acute distress . Pt wearing facemask  HEENT: EOMI, no scleral icterus is noted. No carotid bruit appreciated. CV: RRR, palpable pedal pulses bilaterally  Pulmonary: No increased work or breathing is noted. CTA bilaterally. Abdomen: nondistended    Extremities: Warm and well perfused bilaterally. No significant BLE edema. Neuro: Cranial nerves II through XII are grossly intact   Integument: No ulcerations are identified visibly      Impression and Plan:  Yousuf Cerna is a 69 y.o. female with stable 3.5cm descending aortic aneurysm as well as 3 cm AAA. Imaging was reviewed and office today with patient. She does have chronic pain secondary to her spinal issues but denies any other pain or complaints. No CP. No abdominal pain. No claudication. Will repeat CT scan in 1 year for continued surveillance and she will f/u afterwards. Sooner as needed. Plan was discussed. Patient expresses understanding and agrees.         Liberty Billings

## 2020-10-28 NOTE — PROGRESS NOTES
1. Have you been to the ER, urgent care clinic since your last visit? Hospitalized since your last visit? No    2. Have you seen or consulted any other health care providers outside of the [address] [place] [address] since your last visit? Include any pap smears or colon screening.  Yes Reason for visit: pcp, eye doctor, pulmonology         3 most recent [facility] 36 Screens 10/28/2020   Little interest or pleasure in doing things Not at all   Feeling down, depressed, irritable, or hopeless Not at all   Total Score PHQ 2 0

## 2021-01-13 ENCOUNTER — HOSPITAL ENCOUNTER (OUTPATIENT)
Dept: MAMMOGRAPHY | Age: 71
Discharge: HOME | End: 2021-01-13
Attending: FAMILY MEDICINE
Payer: MEDICARE

## 2021-01-13 DIAGNOSIS — Z12.31 VISIT FOR SCREENING MAMMOGRAM: ICD-10-CM

## 2021-01-13 PROCEDURE — 77063 BREAST TOMOSYNTHESIS BI: CPT

## 2021-04-22 ENCOUNTER — TRANSCRIBE ORDER (OUTPATIENT)
Dept: REGISTRATION | Age: 71
End: 2021-04-22

## 2021-04-22 ENCOUNTER — HOSPITAL ENCOUNTER (OUTPATIENT)
Dept: LAB | Age: 71
Discharge: HOME | End: 2021-04-22
Payer: MEDICARE

## 2021-04-22 DIAGNOSIS — E78.2 MIXED HYPERLIPIDEMIA: ICD-10-CM

## 2021-04-22 DIAGNOSIS — E78.2 MIXED HYPERLIPIDEMIA: Primary | ICD-10-CM

## 2021-04-22 LAB
CHOLEST SERPL-MCNC: 131 MG/DL
HDLC SERPL-MCNC: 64 MG/DL (ref 40–60)
HDLC SERPL: 2 {RATIO} (ref 0–5)
LDLC SERPL CALC-MCNC: 39.2 MG/DL (ref 0–100)
LIPID PROFILE,FLP: ABNORMAL
TRIGL SERPL-MCNC: 139 MG/DL (ref ?–150)
VLDLC SERPL CALC-MCNC: 27.8 MG/DL

## 2021-04-22 PROCEDURE — 36415 COLL VENOUS BLD VENIPUNCTURE: CPT

## 2021-04-22 PROCEDURE — 80061 LIPID PANEL: CPT

## 2021-06-08 ENCOUNTER — TRANSCRIBE ORDER (OUTPATIENT)
Dept: SCHEDULING | Age: 71
End: 2021-06-08

## 2021-06-08 DIAGNOSIS — J32.0 CHRONIC MAXILLARY SINUSITIS: Primary | ICD-10-CM

## 2021-06-21 ENCOUNTER — HOSPITAL ENCOUNTER (OUTPATIENT)
Dept: CT IMAGING | Age: 71
Discharge: HOME | End: 2021-06-21
Attending: OTOLARYNGOLOGY
Payer: MEDICARE

## 2021-06-21 DIAGNOSIS — J32.0 CHRONIC MAXILLARY SINUSITIS: ICD-10-CM

## 2021-06-21 PROCEDURE — 70486 CT MAXILLOFACIAL W/O DYE: CPT

## 2021-08-03 PROBLEM — M47.816 LUMBAR FACET ARTHROPATHY: Status: RESOLVED | Noted: 2017-06-28 | Resolved: 2021-08-03

## 2021-08-24 ENCOUNTER — HOSPITAL ENCOUNTER (OUTPATIENT)
Dept: LAB | Age: 71
Discharge: HOME | End: 2021-08-24
Payer: MEDICARE

## 2021-08-24 LAB
25(OH)D3 SERPL-MCNC: 65.7 NG/ML (ref 30–100)
ALBUMIN SERPL-MCNC: 4 G/DL (ref 3.4–5)
ALBUMIN/GLOB SERPL: 1.3 {RATIO} (ref 0.8–1.7)
ALP SERPL-CCNC: 115 U/L (ref 45–117)
ALT SERPL-CCNC: 26 U/L (ref 13–56)
ANION GAP SERPL CALC-SCNC: 5 MMOL/L (ref 3–18)
AST SERPL-CCNC: 27 U/L (ref 10–38)
BASOPHILS # BLD: 0.1 K/UL (ref 0–0.1)
BASOPHILS NFR BLD: 1 % (ref 0–2)
BILIRUB SERPL-MCNC: 0.6 MG/DL (ref 0.2–1)
BUN SERPL-MCNC: 15 MG/DL (ref 7–18)
BUN/CREAT SERPL: 11 (ref 12–20)
CALCIUM SERPL-MCNC: 9.3 MG/DL (ref 8.5–10.1)
CHLORIDE SERPL-SCNC: 106 MMOL/L (ref 100–111)
CO2 SERPL-SCNC: 32 MMOL/L (ref 21–32)
CREAT SERPL-MCNC: 1.33 MG/DL (ref 0.6–1.3)
DIFFERENTIAL METHOD BLD: ABNORMAL
EOSINOPHIL # BLD: 0.3 K/UL (ref 0–0.4)
EOSINOPHIL NFR BLD: 6 % (ref 0–5)
ERYTHROCYTE [DISTWIDTH] IN BLOOD BY AUTOMATED COUNT: 13.6 % (ref 11.6–14.5)
GLOBULIN SER CALC-MCNC: 3.1 G/DL (ref 2–4)
GLUCOSE SERPL-MCNC: 102 MG/DL (ref 74–99)
HCT VFR BLD AUTO: 38.4 % (ref 35–45)
HGB BLD-MCNC: 12.4 G/DL (ref 12–16)
LYMPHOCYTES # BLD: 1.5 K/UL (ref 0.9–3.6)
LYMPHOCYTES NFR BLD: 25 % (ref 21–52)
MCH RBC QN AUTO: 30.3 PG (ref 24–34)
MCHC RBC AUTO-ENTMCNC: 32.3 G/DL (ref 31–37)
MCV RBC AUTO: 93.9 FL (ref 78–100)
MONOCYTES # BLD: 0.7 K/UL (ref 0.05–1.2)
MONOCYTES NFR BLD: 12 % (ref 3–10)
NEUTS SEG # BLD: 3.5 K/UL (ref 1.8–8)
NEUTS SEG NFR BLD: 57 % (ref 40–73)
PLATELET # BLD AUTO: 212 K/UL (ref 135–420)
PMV BLD AUTO: 10.8 FL (ref 9.2–11.8)
POTASSIUM SERPL-SCNC: 4.4 MMOL/L (ref 3.5–5.5)
PROT SERPL-MCNC: 7.1 G/DL (ref 6.4–8.2)
RBC # BLD AUTO: 4.09 M/UL (ref 4.2–5.3)
SODIUM SERPL-SCNC: 143 MMOL/L (ref 136–145)
TSH SERPL DL<=0.05 MIU/L-ACNC: 1.09 UIU/ML (ref 0.36–3.74)
WBC # BLD AUTO: 6.2 K/UL (ref 4.6–13.2)

## 2021-08-24 PROCEDURE — 85025 COMPLETE CBC W/AUTO DIFF WBC: CPT

## 2021-08-24 PROCEDURE — 84443 ASSAY THYROID STIM HORMONE: CPT

## 2021-08-24 PROCEDURE — 36415 COLL VENOUS BLD VENIPUNCTURE: CPT

## 2021-08-24 PROCEDURE — 82306 VITAMIN D 25 HYDROXY: CPT

## 2021-08-24 PROCEDURE — 80053 COMPREHEN METABOLIC PANEL: CPT

## 2021-09-01 DIAGNOSIS — R91.1 INCIDENTAL LUNG NODULE, > 3MM AND < 8MM: Primary | ICD-10-CM

## 2021-09-01 DIAGNOSIS — G47.34 NOCTURNAL HYPOXEMIA: ICD-10-CM

## 2021-09-01 DIAGNOSIS — J44.9 CHRONIC OBSTRUCTIVE PULMONARY DISEASE, UNSPECIFIED COPD TYPE (HCC): ICD-10-CM

## 2021-09-01 DIAGNOSIS — R06.02 SOB (SHORTNESS OF BREATH): ICD-10-CM

## 2021-09-01 DIAGNOSIS — J44.9 BRONCHITIS WITH CHRONIC AIRWAY OBSTRUCTION (HCC): ICD-10-CM

## 2021-09-01 DIAGNOSIS — G47.34 NOCTURNAL HYPOXIA: ICD-10-CM

## 2021-09-01 DIAGNOSIS — J30.9 ALLERGIC RHINITIS, UNSPECIFIED SEASONALITY, UNSPECIFIED TRIGGER: ICD-10-CM

## 2021-09-01 NOTE — PROGRESS NOTES
Order placed for COVID-19 test, per Verbal Order from Dr. Margaret Lopez on 9/1/2021. Last office visit: 3/3/2020  Follow up Visit: 9/30/2021    Provider is aware of last office visit and follow up. No further action requested from provider.

## 2021-09-15 ENCOUNTER — TELEPHONE (OUTPATIENT)
Dept: PULMONOLOGY | Age: 71
End: 2021-09-15

## 2021-09-15 DIAGNOSIS — G89.18 POST-OP PAIN: ICD-10-CM

## 2021-09-15 DIAGNOSIS — Z98.890 POST-OPERATIVE STATE: ICD-10-CM

## 2021-09-15 DIAGNOSIS — G47.34 NOCTURNAL HYPOXEMIA: Primary | ICD-10-CM

## 2021-09-15 DIAGNOSIS — G47.34 NOCTURNAL HYPOXIA: ICD-10-CM

## 2021-09-15 NOTE — TELEPHONE ENCOUNTER
Pt called([phone]). She is having nasal surgery and she will not be albe to use the nasal cannula for her O2 for several weeks. [facility] told her to call Dr Saravanan Holly to see if she has any mouth pieces for the for the O2. She needs her O2 at night. Please check and call her back.

## 2021-09-15 NOTE — TELEPHONE ENCOUNTER
Spoke with patient, she states she is going to have nasal surgery on 10/07/2021 and her nose will be packed for about 5 weeks. She currently has nocturnal home oxygen, but will not be able to use the nasal cannula after her surgery. [facility] suggested she call our office about getting a mask, but patient is worried about a mask putting pressure on her nose and she would like to know what Dr. Louann Knight suggests.

## 2021-09-15 NOTE — TELEPHONE ENCOUNTER
Order placed for aerosol mask, per Verbal Order from Dr. Florence Limon on 9/15/2021. Last office visit: 6/30/2020  Follow up Visit: Due after surgery    Provider is aware of last office visit and follow up. No further action requested from provider.

## 2021-09-15 NOTE — TELEPHONE ENCOUNTER
She can use an aerosol mask. These are open devices that fit under the chin and deliver Oxygen without blocking the nose.   I can order one if [facility] can get it otherwise I will try to get her one from the hospital

## 2021-09-15 NOTE — TELEPHONE ENCOUNTER
Left message with patient's  requesting a return call. We don't have any oxygen masks here in the office. [facility] should be able to provide patient with a mask, if they need an order we will be happy to provide one.

## 2021-09-15 NOTE — ADDENDUM NOTE
Addended by: [facility] on: 9/15/2021 03:57 PM     Modules accepted: Orders

## 2021-09-22 ENCOUNTER — HOSPITAL ENCOUNTER (OUTPATIENT)
Dept: LAB | Age: 71
Discharge: HOME | End: 2021-09-22
Payer: MEDICARE

## 2021-09-22 LAB
ALBUMIN SERPL-MCNC: 4.1 G/DL (ref 3.4–5)
ALBUMIN/GLOB SERPL: 1.2 {RATIO} (ref 0.8–1.7)
ALP SERPL-CCNC: 111 U/L (ref 45–117)
ALT SERPL-CCNC: 26 U/L (ref 13–56)
ANION GAP SERPL CALC-SCNC: 4 MMOL/L (ref 3–18)
APTT PPP: 29.2 SEC (ref 23–36.4)
AST SERPL-CCNC: 30 U/L (ref 10–38)
BASOPHILS # BLD: 0.1 K/UL (ref 0–0.1)
BASOPHILS NFR BLD: 1 % (ref 0–2)
BILIRUB SERPL-MCNC: 0.7 MG/DL (ref 0.2–1)
BUN SERPL-MCNC: 11 MG/DL (ref 7–18)
BUN/CREAT SERPL: 9 (ref 12–20)
CALCIUM SERPL-MCNC: 9.2 MG/DL (ref 8.5–10.1)
CHLORIDE SERPL-SCNC: 103 MMOL/L (ref 100–111)
CO2 SERPL-SCNC: 33 MMOL/L (ref 21–32)
CREAT SERPL-MCNC: 1.18 MG/DL (ref 0.6–1.3)
DIFFERENTIAL METHOD BLD: ABNORMAL
EOSINOPHIL # BLD: 0.3 K/UL (ref 0–0.4)
EOSINOPHIL NFR BLD: 4 % (ref 0–5)
ERYTHROCYTE [DISTWIDTH] IN BLOOD BY AUTOMATED COUNT: 14 % (ref 11.6–14.5)
GLOBULIN SER CALC-MCNC: 3.4 G/DL (ref 2–4)
GLUCOSE SERPL-MCNC: 105 MG/DL (ref 74–99)
HCT VFR BLD AUTO: 39.9 % (ref 35–45)
HGB BLD-MCNC: 12.6 G/DL (ref 12–16)
INR PPP: 1 (ref 0.8–1.2)
LYMPHOCYTES # BLD: 2.1 K/UL (ref 0.9–3.6)
LYMPHOCYTES NFR BLD: 27 % (ref 21–52)
MCH RBC QN AUTO: 30.2 PG (ref 24–34)
MCHC RBC AUTO-ENTMCNC: 31.6 G/DL (ref 31–37)
MCV RBC AUTO: 95.7 FL (ref 78–100)
MONOCYTES # BLD: 0.7 K/UL (ref 0.05–1.2)
MONOCYTES NFR BLD: 9 % (ref 3–10)
NEUTS SEG # BLD: 4.6 K/UL (ref 1.8–8)
NEUTS SEG NFR BLD: 59 % (ref 40–73)
PLATELET # BLD AUTO: 223 K/UL (ref 135–420)
PMV BLD AUTO: 10.9 FL (ref 9.2–11.8)
POTASSIUM SERPL-SCNC: 4.4 MMOL/L (ref 3.5–5.5)
PROT SERPL-MCNC: 7.5 G/DL (ref 6.4–8.2)
PROTHROMBIN TIME: 13 SEC (ref 11.5–15.2)
RBC # BLD AUTO: 4.17 M/UL (ref 4.2–5.3)
SODIUM SERPL-SCNC: 140 MMOL/L (ref 136–145)
WBC # BLD AUTO: 7.9 K/UL (ref 4.6–13.2)

## 2021-09-22 PROCEDURE — 85610 PROTHROMBIN TIME: CPT

## 2021-09-22 PROCEDURE — 85025 COMPLETE CBC W/AUTO DIFF WBC: CPT

## 2021-09-22 PROCEDURE — 85730 THROMBOPLASTIN TIME PARTIAL: CPT

## 2021-09-22 PROCEDURE — 36415 COLL VENOUS BLD VENIPUNCTURE: CPT

## 2021-09-22 PROCEDURE — 80053 COMPREHEN METABOLIC PANEL: CPT

## 2021-09-30 ENCOUNTER — OFFICE VISIT (OUTPATIENT)
Dept: PULMONOLOGY | Age: 71
End: 2021-09-30
Payer: MEDICARE

## 2021-09-30 VITALS
RESPIRATION RATE: 18 BRPM | DIASTOLIC BLOOD PRESSURE: 66 MMHG | TEMPERATURE: 97.7 F | BODY MASS INDEX: 33.01 KG/M2 | OXYGEN SATURATION: 96 % | WEIGHT: 179.4 LBS | HEIGHT: 62 IN | HEART RATE: 63 BPM | SYSTOLIC BLOOD PRESSURE: 132 MMHG

## 2021-09-30 DIAGNOSIS — Z01.818 PREOPERATIVE CLEARANCE: Primary | ICD-10-CM

## 2021-09-30 DIAGNOSIS — J44.9 BRONCHITIS WITH CHRONIC AIRWAY OBSTRUCTION (HCC): ICD-10-CM

## 2021-09-30 DIAGNOSIS — G47.34 NOCTURNAL HYPOXEMIA: ICD-10-CM

## 2021-09-30 DIAGNOSIS — R91.1 INCIDENTAL LUNG NODULE, > 3MM AND < 8MM: ICD-10-CM

## 2021-09-30 PROCEDURE — G8417 CALC BMI ABV UP PARAM F/U: HCPCS | Performed by: INTERNAL MEDICINE

## 2021-09-30 PROCEDURE — 1101F PT FALLS ASSESS-DOCD LE1/YR: CPT | Performed by: INTERNAL MEDICINE

## 2021-09-30 PROCEDURE — 99215 OFFICE O/P EST HI 40 MIN: CPT | Performed by: INTERNAL MEDICINE

## 2021-09-30 PROCEDURE — G8754 DIAS BP LESS 90: HCPCS | Performed by: INTERNAL MEDICINE

## 2021-09-30 PROCEDURE — G8536 NO DOC ELDER MAL SCRN: HCPCS | Performed by: INTERNAL MEDICINE

## 2021-09-30 PROCEDURE — G8427 DOCREV CUR MEDS BY ELIG CLIN: HCPCS | Performed by: INTERNAL MEDICINE

## 2021-09-30 PROCEDURE — 3017F COLORECTAL CA SCREEN DOC REV: CPT | Performed by: INTERNAL MEDICINE

## 2021-09-30 PROCEDURE — 1090F PRES/ABSN URINE INCON ASSESS: CPT | Performed by: INTERNAL MEDICINE

## 2021-09-30 PROCEDURE — G8752 SYS BP LESS 140: HCPCS | Performed by: INTERNAL MEDICINE

## 2021-09-30 PROCEDURE — G8432 DEP SCR NOT DOC, RNG: HCPCS | Performed by: INTERNAL MEDICINE

## 2021-09-30 PROCEDURE — G9899 SCRN MAM PERF RSLTS DOC: HCPCS | Performed by: INTERNAL MEDICINE

## 2021-09-30 PROCEDURE — G8399 PT W/DXA RESULTS DOCUMENT: HCPCS | Performed by: INTERNAL MEDICINE

## 2021-09-30 NOTE — LETTER
9/30/2021    Patient: Nanette Leon   YOB: 1950   Date of Visit: 9/30/2021     Bro Greene, [address] [facility]  [address] [place] [phone]  Via Fax: [address] [facility] [place]  [address] [address] [place] [phone]  Via Fax: [phone]    Dear MD Melina Adorno MD,      Thank you for referring Ms. Terry Macias to [address] [place] [address] for evaluation. My notes for this consultation are attached. If you have questions, please do not hesitate to call me. I look forward to following your patient along with you.       Sincerely,    Laurie Castano MD

## 2021-09-30 NOTE — PROGRESS NOTES
[facility]  Pulmonary, Critical Care, and Sleep Medicine      Pulmonary Office visit. Name: Anatoly Osullivan     : 1950     Date: 2021        Subjective:     Patient is a 66 y.o. female is here for preop clearance for upcoming sinus surgery  follow up-evaluation for need for nocturnal Oxygen supplementation, evaluation of an abnormal Ct scan of chest.    21   Patient states that she has been having persistent difficulty breathing through her nose and ENT evaluation recommends balloon sinuplasty procedure for which she is planned next week. Patient is on supplemental oxygen when nasal cannula at nighttime and is concerned about being able to use the nasal cannula in the postop. She has been having occasional cough and wheezing-ran out of albuterol and could not get it filled due to cost  Has some dry cough which occurs randomly but subsides  She had SOB with activity but no PND or orthopnea. No fever or chills. Using night time O2  Had titration study completed- results suggest nocturnal Oxygen desaturation despite CPAP 9 cm. Needs 2 L Oxygen in addition. On Singulair, flonase  She also has h/o allergies and is receiving allergy immunotherapy by Dr. Kezia Camejo. And now planning surgery    HPI:  Followed from 2014 To 10/2014. Most recent follow up Ct scan 2015 with stability in nodule. She had 24 month follow up CT completed and is here to discuss results. She also had follow up Sleep evaluation- Dr. Kathy Walker who reports nocturnal hypoxemia on initial study and follow up titration study. Patient was asked to follow up with Pulmonology. She has smoked less than 1 ppd for past 47+ years and eventually quit smoking in   She denies any sleep related problems, leg swelling. Denies joint pains. 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 H/o rheumatic fever in childhood.      Past Medical History:   Diagnosis Date    Arthritis     Chronic lung disease     Chronic obstructive pulmonary disease (HCC)     Descending aortic aneurysm (HCC)     Fibromyalgia     Hypercholesterolemia     Hypertension     Ill-defined condition     Sleep apnea     mouth guard, oxygen HS       Past Surgical History:   Procedure Laterality Date    COLONOSCOPY N/A 7/3/2017    COLONOSCOPY with biopsies performed by Coleman Sepulveda MD at [facility]    HX APPENDECTOMY      HX BREAST BIOPSY Left     HX CATARACT REMOVAL      HX TUBAL LIGATION       Allergies   Allergen Reactions    Benadryl [Diphenhydramine Hcl] Hives    Codeine Itching    Cymbalta [Duloxetine] Other (comments)     Shaking, feeling someone living inside my body, arms going numb.  Iodinated Contrast Media Hives     Pt states severe hives     Levaquin [Levofloxacin] Nausea Only    Lyrica [Pregabalin] Other (comments)     Shaking, feeling someone living in my body, numb arm. Any meds for fibromyialga    Other Medication Swelling     Pt wrote having an allergic reaction to \"tetnus\". \"Extreme swelling at site\"     Sulfur Hives     Current Outpatient Medications   Medication Sig Dispense Refill    HYDROcodone-acetaminophen (NORCO) 5-325 mg per tablet Take 1 Tab by mouth every eight (8) hours as needed.  cholecalciferol (VITAMIN D3) (5000 Units/125 mcg) tab tablet Take 5,000 Units by mouth daily.  cyclobenzaprine (FLEXERIL) 10 mg tablet Take 5 mg by mouth three (3) times daily as needed.  magnesium 250 mg tab Take 125 mg by mouth daily.  niacin (SLO-NIACIN) 500 mg ER Tablet Take 2 Tabs by mouth nightly.  OXYGEN-AIR DELIVERY SYSTEMS 2 L/min by Does Not Apply route nightly. Indications: DME: [facility]      Flaxseed Oil oil 1 Tab by Does Not Apply route daily.  NIFEdipine ER (PROCARDIA XL) 30 mg ER tablet Take 30 mg by mouth daily.  atenolol (TENORMIN) 50 mg tablet Take 25 mg by mouth daily.  atorvastatin (LIPITOR) 40 mg tablet Take 80 mg by mouth daily.       pantoprazole (PROTONIX) 40 mg tablet Take 40 mg by mouth two (2) times a day.  zolpidem (AMBIEN) 10 mg tablet Take 5 mg by mouth nightly as needed for Sleep.  levocetirizine (XYZAL) 5 mg tablet Take 5 mg by mouth daily.  fluticasone (FLONASE) 50 mcg/actuation nasal spray 2 Sprays by Both Nostrils route two (2) times daily as needed.  montelukast (SINGULAIR) 10 mg tablet Take 10 mg by mouth nightly.  multivitamin (ONE A DAY) tablet Take 1 Tab by mouth daily.  aspirin delayed-release 81 mg tablet Take 81 mg by mouth daily.  VITAMIN B COMPLEX (B COMPLETE PO) Take 1 Tab by mouth daily.  potassium 99 mg tablet Take 45.5 mg by mouth daily.  ALPRAZolam (XANAX) 0.25 mg tablet Take 0.25 mg by mouth nightly as needed. (Patient not taking: Reported on 9/30/2021)      biotin 1,000 mcg chew Take 1 Tab by mouth daily. (Patient not taking: Reported on 9/30/2021)      furosemide (LASIX) 20 mg tablet Take 20 mg by mouth daily as needed.  (Patient not taking: Reported on 6/25/2021)       Review of Systems:  HEENT: No epistaxis, no nasal drainage, no difficulty in swallowing, no redness in eyes  Respiratory: as above  Cardiovascular: no chest pain, no palpitations, no chronic leg edema, no syncope  Gastrointestinal: no abd pain, no vomiting, no diarrhea, no bleeding symptoms  Genitourinary: No urinary symptoms or hematuria  Integument/breast: No ulcers or rashes  Musculoskeletal:Neg  Neurological: No focal weakness, no seizures, no headaches  Behvioral/Psych: No anxiety, no depression  Constitutional: No fever, no chills, no weight loss, no night sweats     Objective:     Visit Vitals  /66 (BP 1 Location: Left upper arm, BP Patient Position: Sitting, BP Cuff Size: Large adult)   Pulse 63   Temp 97.7 °F (36.5 °C) (Oral)   Resp 18   Ht 5' 2\" (1.575 m)   Wt 81.4 kg (179 lb 6.4 oz)   SpO2 96% Comment: RA Rest   BMI 32.81 kg/m²        Physical Exam:   General: comfortable, no acute distress  HEENT: pupils reactive, sclera anicteric, EOM intact  Neck: No adenopathy or thyroid swelling, no lymphadenopathy or JVD, supple  CVS: S1S2 no murmurs  RS: Mod AE bilaterally, no tactile fremitus or egophony, no accessory muscle use  Abd: soft, non tender, no hepatosplenomegaly  Neuro: non focal, awake, alert  Extrm: no leg edema, clubbing or cyanosis  Skin: no rash    Data review:   PFT's:  Flows:  Maximal Mid Expiratory Flow rate is reduced to 48 % predicted  Forced Expiratory Volume in one second is normal  FEV 1% is reduced  Volumes:  Normal Volumes  Flow Volume Loop:  Reduced Terminal Flows in the Flow Volume Loop  Bronchodilator:  Significant improvement with bronchodilator  Diffusion:  Low Normal Diffusion Capacity  Impression:  Mild to Moderate obstructive defect, predominately small airways    Imaging:  I have personally reviewed the patients radiographs and have reviewed the reports:  CT Results (most recent):  Results from Hospital Encounter encounter on 06/21/21    CT SINUSES WO CONT    Narrative  EXAM: CT SINUSES WO CONT    CLINICAL INDICATION/HISTORY: Right maxillary sinusitis    TECHNIQUE: Contiguous axial images were obtained through the paranasal sinuses. From these, sagittal and coronal reconstructions were generated. Contrast Used: None    CT scans at this facility are performed using dose optimization technique as  appropriate with performed exam, to include automated exposure control,  adjustment of mA and/or kV according to patient's size (including appropriate  matching for site-specific examinations), or use of iterative reconstruction  technique. COMPARISON: August 2019 CT sinus    FINDINGS:        Soft tissues: Unremarkable. Facial bones: Intact. Orbits: Unremarkable.     Paranasal Sinuses:    Nasal passages: Septum is a mildly undulating course and there is fairly  prominent gary bullosa changes of the lamina of the middle turbinates  bilaterally    Frontal sinuses:    Ligaments patent frontal drainage pathways are clear    Maxillary sinuses: Lumens are clear drainage pathway is patent uncinate  processes unremarkable    Ethmoid air cells: These are clear the sphenoethmoid foramina and drainage  pathway appear patent    Sphenoid sinus: There is a presellar configuration lumen is clear sphenoethmoid  drainage pathway patent    Dental: There is evidence for patient's undergone placement of 6 maxillary and  for mandibular post these appear to be in position surrounding bone shows no  evidence of any loosening    High cervical region: Unremarkable. Lower intracranial imaging: Unremarkable. Impression  :    1. Sinuses are clear no evidence of acute or chronic sinusitis is seen    Patient is absent all native dentition with implanting post placed in the  maxillary and mandibular anatomy    CT scan 10/2017:  6 mm right upper lobe nodule against the minor fissure is stable over the  last 3 years, benign    CT scan of chest ( [facility]-) 5/2016. LUNGS:  Nodules:  -5 mm perifissural nodule along the right minor fissure (image 131), solid circumscribed, likely an intrapulmonary lymph node. -5 mm groundglass nodule right lung apex (image 56). Other pulmonary findings:  Patchy coarse reticular opacification in the mid and lower lungs, scarring and/or subsegmental atelectasis. Few regions of mild cylindrical bronchiectasis. OTHER:   No significant incidental findings. CT scan 7/2015:  Stable pulmonary nodule along the minor fissure and stable borderline  precarinal mediastinal lymph node since 9/2014. Ct scan of chest:6/2014:C  There is a 0.5 x 0.4 cm noncalcified ovoid density in the right upper lobe   inferior aspect abutting the minor fissure (image #32). No dominant lung mass   is detected. Subsegmental atelectatic changes are identified in the right   middle lobe and the right lower lobe.  Less pronounced subtle subsegmental   atelectatic changes also identified in the lingular region and left lung base. No focal infiltrate or consolidation is observed. Ct scan of abd/chest- 9/2014;  1. Stable right pulmonary nodule. 2. The descending thoracic aortic aneurysm is stable. Ectasia of the ascending   aorta is stable. 3. No evidence for bowel obstruction or inflammation. Inspissated stool in the   descending colon could be the result of constipation. 4. 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 2-D echo:  Left ventricle: Size was normal. Systolic function was normal by EF  (biplane method of disks). Ejection fraction was estimated to be 55 %. There were no regional wall motion abnormalities. Wall thickness was  normal. Doppler parameters were consistent with abnormal left ventricular  relaxation (grade 1 diastolic dysfunction). Right ventricle: Systolic pressure was mildly increased. Estimated peak  pressure was 35 mmHg. Left atrium: The atrium was mildly dilated. Mitral valve: There was mild annular calcification. Tricuspid valve: There was trace to mild regurgitation. Pulmonary arteries: Systolic pressure was mildly increased. IMPRESSION:     · Preop clearance-has mild obstructive airways disease which should not be a significant risk for planned surgical procedure  · Asthma and obstructive bronchitis also with some radiologic findings consistent with chronic atelectasis- asymptomatic  · Nocturnal hypoxemia-most recent overnight oximetry continues to show nocturnal oxygen desaturation needing supplemental oxygen  · Lung nodule- incidental finding of 0.5x0.4 cm RUL nodule in a patient who has been a smoker 52 PPD. High risk for malignancy and needs appropriate follow up. 24 month stability demonstrated. Discussed report with patient. Need to follow up annually per USTFP recommendations- low dose lung CT screening protocol or can accept Ct done for evaluation of Aneurysm. 6 mm right upper lobe nodule against the minor fissure -stable  · Mild pulmonary HTN   · Allergic rhinitis -on Flonase and allergy injections  · Esophageal stricture- s/p dilatation  And h/o hiatal hernia ? GERD with silent aspiration as cause for subtle basal interstitial fibrotic changes  · HTN  · Fusiform aneurysm of the distal descending aorta- vascular surgery following  · hyperlipidemia      RECOMMENDATIONS:   Can proceed with the planned surgery-low risk from pulmonary standpoint  Continue Nocturnal oxygen supplementation based on documented Oxygen desaturation during sleep despite LESLIE corrective devices including CPAP. Qualifying study completed on 6/3/2020-I have provided her with a face tent to use in the postop period To avoid contact with the nose  This patient has hypoxia related clinical diagnosis that will improve with oxygen therapy. Other treatment measures have been tried and have been ineffective.   We will prescribe Spiriva Respimat 1.25 mcg  Continue prn albuterol  Antireflux therapy and continued interventions- dietary and medications  Maintain smoking cessation- congratulated on success  Preventive vaccinations  Will follow up   Questions and concerns addressed          Jeffery Marroquin MD

## 2021-09-30 NOTE — PATIENT INSTRUCTIONS
Oxygen Therapy: Care Instructions  Your Care Instructions     Oxygen therapy helps you get more oxygen into your lungs and bloodstream. You may use it if you have a disease that makes it hard to breathe, such as COPD, pulmonary fibrosis (scarring of the lungs), or heart failure. Oxygen therapy can make it easier for you to breathe and can reduce your heart's workload. Some people need extra oxygen all the time. Others need it from time to time throughout the day or overnight. A doctor will prescribe how much oxygen you need and how often to use it. To breathe the oxygen, most people use a nasal cannula (say \"CATRACHO-yuh-orlando\"). This is a thin tube with two prongs that fit just inside your nose. People who need a lot of oxygen may need to use a mask that fits over the nose and mouth. Follow-up care is a key part of your treatment and safety. Be sure to make and go to all appointments, and call your doctor if you are having problems. It's also a good idea to know your test results and keep a list of the medicines you take. How can you care for yourself at home? To help yourself  · Using oxygen may dry out your nose or lips. Use water-based lubricants on your lips or nostrils. Do not use an oil-based product like petroleum jelly. · If you use a nasal cannula, the tubing may rub under your nostrils and around your ears. To keep your skin from getting sore, tuck some gauze under the tubing. Use a water-based lotion on rubbed areas. · Do not use alcohol or take drugs that relax you, because they will slow your breathing rate. · Keep track of how much oxygen is in the tank, and reorder before it runs out. If a holiday is coming up or you expect bad weather, order in advance or make your regular order larger. · You may need extra oxygen when you travel to high altitudes or travel by plane. Ask your doctor about this.   · If you are getting oxygen directly to your windpipe through an opening in your neck, your doctor will teach you how to care for the equipment. To make sure oxygen is flowing  · Check the flow by holding your mask or cannula up to your ear and listening for the \"hiss\" of airflow. · If you have a nasal cannula, dip the prongs in a glass of water. If you see bubbles, oxygen is coming through. · Check your pressure gauge or contents indicator. · If you use an oxygen concentrator, make sure it is turned on and plugged in. If you use a cylinder, make sure the valve is open. · Look for kinks, blockages, or water in the tubing. Be sure the tubing is connected to the oxygen source. · Do not change your oxygen flow rate. Your doctor sets this at the correct level. Higher flow rates usually do not help and can increase the risk of harmful carbon dioxide buildup in the blood. To be safe  · Do not leave cords or tubing running across an area where you or someone else may trip on it. · Do not let oxygen containers get hot. Store them in a cool place where there is airflow. Do not leave them in a car trunk or a hot vehicle. · Keep oxygen containers upright. Make sure they do not fall over and get damaged. Try securing the tanks in a sturdy container or securing them with a rope or a chain. · Watch for signs of oxygen leaks. If you hear a loud hissing from your container or if it empties too fast, stay away from the container. Open windows right away and call the company that brought the oxygen system to your home. · Do not use oxygen around anything that could spark or easily cause a fire. ? Do not smoke or let others smoke while you are using oxygen. Put up \"no smoking\" signs in your home. ? Do not use oxygen near open flames, such as candles, fireplaces, gas stoves, or hot water heaters. Do not use it near electric razors, hair dryers, heating pads, or anything that may spark. ?  Keep a working fire extinguisher in your home where it is easy to get to.  ? If a fire starts, turn off the oxygen right away and leave the house. ? If you have an oxygen concentrator, do not use it if the cord looks damaged. Do not use an extension cord to plug it in. Do not plug it into an outlet that has other appliances plugged into it. To care for the equipment  · Follow the directions that come with the equipment for using and caring for it. · Wash your cannula or mask with a liquid soap and warm water 1 or 2 times a week. Replace them every 2 to 4 weeks. · If you have a cold, change the nasal prongs when your cold symptoms are done. · If you have an oxygen concentrator, unplug the unit and wipe down the cabinet with a damp cloth daily. Clean the air filter at least 2 times a week. Where can you learn more? Go to http://www.[employer]/  Enter E117 in the search box to learn more about \"Oxygen Therapy: Care Instructions. \"  Current as of: July 6, 2021               Content Version: 13.0  © 2006-2021 [employer]. Care instructions adapted under license by [employer] (which disclaims liability or warranty for this information). If you have questions about a medical condition or this instruction, always ask your healthcare professional. [place] [address] any warranty or liability for your use of this information.

## 2021-09-30 NOTE — PROGRESS NOTES
Josep Both presents today for   Chief Complaint   Patient presents with    Surgical Clearance       Is someone accompanying this pt? No    Is the patient using any DME equipment during OV? No    -DME Company [facility]  [employer] for HS Oxygen    Depression Screening:  3 most recent PHQ Screens 10/28/2020   Little interest or pleasure in doing things Not at all   Feeling down, depressed, irritable, or hopeless Not at all   Total Score PHQ 2 0       Learning Assessment:  Learning Assessment 10/11/2018   PRIMARY LEARNER Patient   CO-LEARNER CAREGIVER -   PRIMARY LANGUAGE ENGLISH   LEARNER PREFERENCE PRIMARY LISTENING   ANSWERED BY patient   RELATIONSHIP SELF       Abuse Screening:  Abuse Screening Questionnaire 3/3/2020   Do you ever feel afraid of your partner? N   Are you in a relationship with someone who physically or mentally threatens you? N   Is it safe for you to go home? Y       Fall Risk  Fall Risk Assessment, last 12 mths 10/28/2020   Able to walk? Yes   Fall in past 12 months? No         Coordination of Care:  1. Have you been to the ER, urgent care clinic since your last visit? Hospitalized since your last visit? No    2. Have you seen or consulted any other health care providers outside of the [address] [place] [address] since your last visit? Include any pap smears or colon screening.  No

## 2021-09-30 NOTE — LETTER
9/30/2021 1:18 PM    Patient:  Colleen Vera   YOB: 1950  Date of Visit: 9/30/2021      Dear Louis Oliver MD  [address] [place], [place] [id number] [place] [phone]  Via Fax: [phone]: Thank you for referring Ms. Sudhir Suarez to me for evaluation/treatment. Below are the relevant portions of my assessment and plan of care. If you have questions, please do not hesitate to call me. I look forward to following [facility] along with you.         Sincerely,      Marla Michelle MD

## 2021-10-01 ENCOUNTER — HOSPITAL ENCOUNTER (OUTPATIENT)
Dept: LAB | Age: 71
Discharge: HOME | End: 2021-10-01
Payer: MEDICARE

## 2021-10-01 PROCEDURE — 36415 COLL VENOUS BLD VENIPUNCTURE: CPT

## 2021-10-01 PROCEDURE — U0003 INFECTIOUS AGENT DETECTION BY NUCLEIC ACID (DNA OR RNA); SEVERE ACUTE RESPIRATORY SYNDROME CORONAVIRUS 2 (SARS-COV-2) (CORONAVIRUS DISEASE [COVID-19]), AMPLIFIED PROBE TECHNIQUE, MAKING USE OF HIGH THROUGHPUT TECHNOLOGIES AS DESCRIBED BY CMS-2020-01-R: HCPCS

## 2021-10-02 LAB — SARS-COV-2, COV2NT: NOT DETECTED

## 2021-10-07 ENCOUNTER — HOSPITAL ENCOUNTER (EMERGENCY)
Age: 71
Discharge: HOME | End: 2021-10-08
Attending: EMERGENCY MEDICINE
Payer: MEDICARE

## 2021-10-07 DIAGNOSIS — E86.0 DEHYDRATION: Primary | ICD-10-CM

## 2021-10-07 DIAGNOSIS — Z48.89 ENCOUNTER FOR POST SURGICAL WOUND CHECK: ICD-10-CM

## 2021-10-07 LAB
ANION GAP SERPL CALC-SCNC: 7 MMOL/L (ref 3–18)
BASOPHILS # BLD: 0.2 K/UL (ref 0–0.1)
BASOPHILS NFR BLD: 1 % (ref 0–2)
BUN SERPL-MCNC: 30 MG/DL (ref 7–18)
BUN/CREAT SERPL: 15 (ref 12–20)
CALCIUM SERPL-MCNC: 8.1 MG/DL (ref 8.5–10.1)
CHLORIDE SERPL-SCNC: 102 MMOL/L (ref 100–111)
CO2 SERPL-SCNC: 30 MMOL/L (ref 21–32)
CREAT SERPL-MCNC: 1.95 MG/DL (ref 0.6–1.3)
DIFFERENTIAL METHOD BLD: ABNORMAL
EOSINOPHIL # BLD: 0 K/UL (ref 0–0.4)
EOSINOPHIL NFR BLD: 0 % (ref 0–5)
ERYTHROCYTE [DISTWIDTH] IN BLOOD BY AUTOMATED COUNT: 14.1 % (ref 11.6–14.5)
GLUCOSE SERPL-MCNC: 150 MG/DL (ref 74–99)
HCT VFR BLD AUTO: 41.2 % (ref 35–45)
HGB BLD-MCNC: 13.8 G/DL (ref 12–16)
LYMPHOCYTES # BLD: 1.9 K/UL (ref 0.9–3.6)
LYMPHOCYTES NFR BLD: 8 % (ref 21–52)
MCH RBC QN AUTO: 30.7 PG (ref 24–34)
MCHC RBC AUTO-ENTMCNC: 33.5 G/DL (ref 31–37)
MCV RBC AUTO: 91.8 FL (ref 78–100)
MONOCYTES # BLD: 1.4 K/UL (ref 0.05–1.2)
MONOCYTES NFR BLD: 6 % (ref 3–10)
MYELOCYTES NFR BLD MANUAL: 1 %
NEUTS SEG # BLD: 19.5 K/UL (ref 1.8–8)
NEUTS SEG NFR BLD: 84 % (ref 40–73)
PLATELET # BLD AUTO: 252 K/UL (ref 135–420)
PLATELET COMMENTS,PCOM: ABNORMAL
PMV BLD AUTO: 10.7 FL (ref 9.2–11.8)
POTASSIUM SERPL-SCNC: 4.4 MMOL/L (ref 3.5–5.5)
RBC # BLD AUTO: 4.49 M/UL (ref 4.2–5.3)
RBC MORPH BLD: ABNORMAL
SODIUM SERPL-SCNC: 139 MMOL/L (ref 136–145)
WBC # BLD AUTO: 23.2 K/UL (ref 4.6–13.2)

## 2021-10-07 PROCEDURE — 80048 BASIC METABOLIC PNL TOTAL CA: CPT

## 2021-10-07 PROCEDURE — 99284 EMERGENCY DEPT VISIT MOD MDM: CPT

## 2021-10-07 PROCEDURE — 96361 HYDRATE IV INFUSION ADD-ON: CPT

## 2021-10-07 PROCEDURE — 96374 THER/PROPH/DIAG INJ IV PUSH: CPT

## 2021-10-07 PROCEDURE — 74011250636 HC RX REV CODE- 250/636: Performed by: EMERGENCY MEDICINE

## 2021-10-07 PROCEDURE — 85025 COMPLETE CBC W/AUTO DIFF WBC: CPT

## 2021-10-07 RX ORDER — SODIUM CHLORIDE 0.9 % (FLUSH) 0.9 %
5-40 SYRINGE (ML) INJECTION AS NEEDED
Status: DISCONTINUED | OUTPATIENT
Start: 2021-10-07 | End: 2021-10-08 | Stop reason: HOSPADM

## 2021-10-07 RX ORDER — SODIUM CHLORIDE 0.9 % (FLUSH) 0.9 %
5-40 SYRINGE (ML) INJECTION EVERY 8 HOURS
Status: DISCONTINUED | OUTPATIENT
Start: 2021-10-07 | End: 2021-10-08 | Stop reason: HOSPADM

## 2021-10-07 RX ORDER — ONDANSETRON 2 MG/ML
4 INJECTION INTRAMUSCULAR; INTRAVENOUS
Status: COMPLETED | OUTPATIENT
Start: 2021-10-07 | End: 2021-10-07

## 2021-10-07 RX ADMIN — ONDANSETRON 4 MG: 2 INJECTION INTRAMUSCULAR; INTRAVENOUS at 23:00

## 2021-10-07 RX ADMIN — SODIUM CHLORIDE 1000 ML: 900 INJECTION, SOLUTION INTRAVENOUS at 22:55

## 2021-10-07 RX ADMIN — SODIUM CHLORIDE 1000 ML: 900 INJECTION, SOLUTION INTRAVENOUS at 23:00

## 2021-10-08 VITALS
HEART RATE: 65 BPM | WEIGHT: 178 LBS | OXYGEN SATURATION: 91 % | TEMPERATURE: 98.2 F | SYSTOLIC BLOOD PRESSURE: 115 MMHG | RESPIRATION RATE: 12 BRPM | DIASTOLIC BLOOD PRESSURE: 60 MMHG | BODY MASS INDEX: 32.76 KG/M2 | HEIGHT: 62 IN

## 2021-10-08 PROCEDURE — 74011250636 HC RX REV CODE- 250/636: Performed by: EMERGENCY MEDICINE

## 2021-10-08 RX ADMIN — SODIUM CHLORIDE 1000 ML: 900 INJECTION, SOLUTION INTRAVENOUS at 01:51

## 2021-10-08 NOTE — ED NOTES
Patient with dressing to nares. Dressing with bright red blood but borders noted to not be expanding at this time.

## 2021-10-08 NOTE — ED NOTES
IV fluids infusing without noted problems. Denies any nausea or other discomforts. Will continue to monitor.

## 2021-10-08 NOTE — ED PROVIDER NOTES
HPI 71-year-old female who had a rhinoplasty done today. Patient went home after surgery this am with no complication. She later this evening developed vomiting blood this evening. She called her ENT physician who recommended her to come to the ER for IV hydration. No other complaints. Past Medical History:   Diagnosis Date    Arthritis     Chronic lung disease     Chronic obstructive pulmonary disease ([address]     Descending aortic aneurysm ([address]     Fibromyalgia     Hypercholesterolemia     Hypertension     Ill-defined condition     Sleep apnea     mouth guard, oxygen HS       Past Surgical History:   Procedure Laterality Date    COLONOSCOPY N/A 7/3/2017    COLONOSCOPY with biopsies performed by Gerson Montes MD at [facility] ENDOSCOPY    HX APPENDECTOMY      HX BREAST BIOPSY Left     HX CATARACT REMOVAL      HX TUBAL LIGATION           Family History:   Problem Relation Age of Onset    Hypertension Mother     Diabetes Mother     Cancer Father     Stroke Father        Social History     Socioeconomic History    Marital status:      Spouse name: Not on file    Number of children: Not on file    Years of education: Not on file    Highest education level: Not on file   Occupational History    Not on file   Tobacco Use    Smoking status: Former Smoker     Packs/day: 1.00     Years: 45.00     Pack years: 45.00     Types: Cigarettes     Quit date: 2014     Years since quittin.7    Smokeless tobacco: Never Used   Substance and Sexual Activity    Alcohol use: No    Drug use: No    Sexual activity: Not on file   Other Topics Concern    Not on file   Social History Narrative    Not on file     Social Determinants of Health     Financial Resource Strain:     Difficulty of Paying Living Expenses:    Food Insecurity:     Worried About Running Out of Food in the Last Year:     [address] in the Last Year:    Transportation Needs:     Lack of Transportation (Medical):      Lack of Transportation (Non-Medical):    Physical Activity:     Days of Exercise per Week:     Minutes of Exercise per Session:    Stress:     Feeling of Stress :    Social Connections:     Frequency of Communication with Friends and Family:     Frequency of Social Gatherings with Friends and Family:     Attends [employer] Services:     Active Member of Clubs or Organizations:     Attends Club or Organization Meetings:     Marital Status:    Intimate Partner Violence:     Fear of Current or Ex-Partner:     Emotionally Abused:     Physically Abused:     Sexually Abused: ALLERGIES: Benadryl [diphenhydramine hcl], Codeine, Cymbalta [duloxetine], Iodinated contrast media, Levaquin [levofloxacin], Lyrica [pregabalin], Other medication, and Sulfur    Review of Systems   Constitutional: Negative. HENT: Negative. Eyes: Negative. Respiratory: Negative. Cardiovascular: Negative. Gastrointestinal: Negative. Endocrine: Negative. Genitourinary: Negative. Musculoskeletal: Negative. Skin: Negative. Allergic/Immunologic: Negative. Neurological: Negative. Hematological: Negative. Psychiatric/Behavioral: Negative. All other systems reviewed and are negative. Vitals:    10/07/21 2154 10/07/21 2225 10/07/21 2226   BP: 90/65 (!) 97/54    Pulse: 84  72   Resp: 18 (!) 31 13   Temp: 97.7 °F (36.5 °C)     SpO2: 93%  91%   Weight: 80.7 kg (178 lb)     Height: 5' 2\" (1.575 m)              Physical Exam  Vitals and nursing note reviewed. Constitutional:       General: She is not in acute distress. Appearance: She is well-developed. She is not diaphoretic. HENT:      Head: Normocephalic. Right Ear: External ear normal.      Left Ear: External ear normal.      Nose:      Comments: (+) packing in nose. Mouth/Throat:      Pharynx: No oropharyngeal exudate. Eyes:      General: No scleral icterus. Right eye: No discharge. Left eye: No discharge. Conjunctiva/sclera: Conjunctivae normal.      Pupils: Pupils are equal, round, and reactive to light. Neck:      Thyroid: No thyromegaly. Vascular: No JVD. Trachea: No tracheal deviation. Cardiovascular:      Rate and Rhythm: Normal rate and regular rhythm. Heart sounds: Normal heart sounds. No murmur heard. No friction rub. No gallop. Pulmonary:      Effort: Pulmonary effort is normal. No respiratory distress. Breath sounds: Normal breath sounds. No stridor. No wheezing or rales. Chest:      Chest wall: No tenderness. Abdominal:      General: Bowel sounds are normal. There is no distension. Palpations: Abdomen is soft. There is no mass. Tenderness: There is no abdominal tenderness. There is no guarding or rebound. Musculoskeletal:         General: No tenderness. Normal range of motion. Cervical back: Normal range of motion and neck supple. Lymphadenopathy:      Cervical: No cervical adenopathy. Skin:     General: Skin is warm and dry. Coloration: Skin is not pale. Findings: No erythema or rash. Neurological:      Mental Status: She is alert and oriented to person, place, and time. Cranial Nerves: No cranial nerve deficit. Motor: No abnormal muscle tone. Coordination: Coordination normal.      Deep Tendon Reflexes: Reflexes normal.          MDM  Number of Diagnoses or Management Options     Amount and/or Complexity of Data Reviewed  Clinical lab tests: ordered and reviewed  Tests in the radiology section of CPT®: reviewed      Lab tests: interpreted by me     Treated with IV hydration. Reassess prior to discharge: Patient asymptomatic. Procedures    Dx: dehydration, post op check    Disp: F/U ENT in 24 hours. Return to ER prn. Dictation disclaimer:  Please note that this dictation was completed with [employer], the [employer] voice recognition software.   Quite often unanticipated grammatical, syntax, homophones, and other interpretive errors are inadvertently transcribed by the computer software. Please disregard these errors. Please excuse any errors that have escaped final proofreading.

## 2021-10-08 NOTE — ED TRIAGE NOTES
Rhinopasty with  Eustachian tube  involvement earlier today. Patient states around 2100 tonight patient became light headed, began throwing up blood. Patients states she called surgeon who told her to go to ER for fluids because she may be dehydrated.

## 2021-10-27 ENCOUNTER — HOSPITAL ENCOUNTER (OUTPATIENT)
Dept: CT IMAGING | Age: 71
Discharge: HOME | End: 2021-10-27
Attending: PHYSICIAN ASSISTANT
Payer: MEDICARE

## 2021-10-27 DIAGNOSIS — I71.40 ABDOMINAL AORTIC ANEURYSM (AAA) WITHOUT RUPTURE: ICD-10-CM

## 2021-10-27 DIAGNOSIS — I71.9 DESCENDING AORTIC ANEURYSM (HCC): ICD-10-CM

## 2021-10-27 DIAGNOSIS — I71.9 DESCENDING AORTIC ANEURYSM (HCC): Primary | ICD-10-CM

## 2021-10-27 PROCEDURE — 74176 CT ABD & PELVIS W/O CONTRAST: CPT

## 2021-10-28 DIAGNOSIS — I71.9 DESCENDING AORTIC ANEURYSM (HCC): ICD-10-CM

## 2021-10-28 DIAGNOSIS — I71.40 ABDOMINAL AORTIC ANEURYSM (AAA) WITHOUT RUPTURE: ICD-10-CM

## 2021-10-30 PROBLEM — Z01.818 PREOPERATIVE CLEARANCE: Status: RESOLVED | Noted: 2021-09-30 | Resolved: 2021-10-30

## 2021-11-04 ENCOUNTER — OFFICE VISIT (OUTPATIENT)
Dept: VASCULAR SURGERY | Age: 71
End: 2021-11-04
Payer: MEDICARE

## 2021-11-04 ENCOUNTER — HOSPITAL ENCOUNTER (OUTPATIENT)
Dept: LAB | Age: 71
Discharge: HOME | End: 2021-11-04
Payer: MEDICARE

## 2021-11-04 VITALS
DIASTOLIC BLOOD PRESSURE: 80 MMHG | HEART RATE: 64 BPM | SYSTOLIC BLOOD PRESSURE: 114 MMHG | HEIGHT: 62 IN | OXYGEN SATURATION: 96 % | WEIGHT: 178 LBS | BODY MASS INDEX: 32.76 KG/M2

## 2021-11-04 DIAGNOSIS — I71.20 THORACIC AORTIC ANEURYSM WITHOUT RUPTURE: Primary | ICD-10-CM

## 2021-11-04 LAB
CHOLEST SERPL-MCNC: 119 MG/DL
HDLC SERPL-MCNC: 65 MG/DL (ref 40–60)
HDLC SERPL: 1.8 {RATIO} (ref 0–5)
LDLC SERPL CALC-MCNC: 28.4 MG/DL (ref 0–100)
LIPID PROFILE,FLP: ABNORMAL
TRIGL SERPL-MCNC: 128 MG/DL (ref ?–150)
VLDLC SERPL CALC-MCNC: 25.6 MG/DL

## 2021-11-04 PROCEDURE — 80061 LIPID PANEL: CPT

## 2021-11-04 PROCEDURE — 99213 OFFICE O/P EST LOW 20 MIN: CPT | Performed by: SURGERY

## 2021-11-04 PROCEDURE — 36415 COLL VENOUS BLD VENIPUNCTURE: CPT

## 2021-11-04 NOTE — PROGRESS NOTES
1. Have you been to an emergency room or urgent care clinic since your last visit? Yes, [facility], Nasal problems     Hospitalized since your last visit? If yes, where, when, and reason for visit? No    2. Have you seen or consulted any other health care providers outside of the [facility] since your last visit including any procedures, health maintenance items. If yes, where, when and reason for visit?  No

## 2021-11-15 NOTE — PROGRESS NOTES
Amarilis Connor  Chief Complaint   Patient presents with    Abdominal Aortic Aneurysm       History and Physical    67 y/o F presents for f/u of a known descending thoracic aortic aneurysm. She denies back pain or chest pain. She underwent repeat surveillance imaging. Past Medical History:   Diagnosis Date    Arthritis     Chronic lung disease     Chronic obstructive pulmonary disease ([address]     Descending aortic aneurysm (HCC)     Fibromyalgia     Hypercholesterolemia     Hypertension     Ill-defined condition     Oxygen dependent     at night [facility]    Sleep apnea     mouth guard, oxygen HS     Patient Active Problem List   Diagnosis Code    Descending aortic aneurysm (HCC) I71.9    Hypertension I10    Hypercholesterolemia E78.00    Arthritis M19.90    Incidental lung nodule, > 3mm and < 8mm R91.1    Bronchitis with chronic airway obstruction (HCC) J44.9    Cervical disc disease M50.90    Osteoarthritis of spine with radiculopathy, lumbar region M47.26    Neuropathy G62.9    Fibromyalgia M79.7    Spondylosis of lumbar region without myelopathy or radiculopathy M47.816    Lumbar degenerative disc disease M51.36    Chronic pain syndrome G89.4    Spondylosis of cervical region without myelopathy or radiculopathy M47.812    Cervical facet syndrome M47.812    Degenerative disc disease, cervical M50.30    Nocturnal hypoxemia G47.34     Past Surgical History:   Procedure Laterality Date    COLONOSCOPY N/A 7/3/2017    COLONOSCOPY with biopsies performed by Fredy Garay MD at [facility] ENDOSCOPY    HX APPENDECTOMY      HX BREAST BIOPSY Left     HX CATARACT REMOVAL      HX TUBAL LIGATION       Current Outpatient Medications   Medication Sig Dispense Refill    HYDROcodone-acetaminophen (NORCO) 5-325 mg per tablet Take 1 Tab by mouth every eight (8) hours as needed.  cholecalciferol (VITAMIN D3) (5000 Units/125 mcg) tab tablet Take 5,000 Units by mouth daily.       ALPRAZolam (XANAX) 0.25 mg tablet Take 0.25 mg by mouth nightly as needed.  cyclobenzaprine (FLEXERIL) 10 mg tablet Take 5 mg by mouth three (3) times daily as needed.  magnesium 250 mg tab Take 125 mg by mouth daily.  niacin (SLO-NIACIN) 500 mg ER Tablet Take 2 Tabs by mouth nightly.  OXYGEN-AIR DELIVERY SYSTEMS 2 L/min by Does Not Apply route nightly. Indications: DME: [facility]      Flaxseed Oil oil 1 Tab by Does Not Apply route daily.  NIFEdipine ER (PROCARDIA XL) 30 mg ER tablet Take 30 mg by mouth daily.  atenolol (TENORMIN) 50 mg tablet Take 25 mg by mouth daily.  atorvastatin (LIPITOR) 40 mg tablet Take 80 mg by mouth daily.  pantoprazole (PROTONIX) 40 mg tablet Take 40 mg by mouth two (2) times a day.  zolpidem (AMBIEN) 10 mg tablet Take 5 mg by mouth nightly as needed for Sleep.  levocetirizine (XYZAL) 5 mg tablet Take 5 mg by mouth daily.  fluticasone (FLONASE) 50 mcg/actuation nasal spray 2 Sprays by Both Nostrils route two (2) times daily as needed.  montelukast (SINGULAIR) 10 mg tablet Take 10 mg by mouth nightly.  multivitamin (ONE A DAY) tablet Take 1 Tab by mouth daily.  aspirin delayed-release 81 mg tablet Take 81 mg by mouth daily.  VITAMIN B COMPLEX (B COMPLETE PO) Take 1 Tab by mouth daily.  potassium 99 mg tablet Take 45.5 mg by mouth daily.  tiotropium bromide (Spiriva Respimat) 1.25 mcg/actuation inhaler Take 2 Puffs by inhalation daily. (Patient not taking: Reported on 11/4/2021) 4 g 0     Allergies   Allergen Reactions    Benadryl [Diphenhydramine Hcl] Hives    Codeine Itching    Cymbalta [Duloxetine] Other (comments)     Shaking, feeling someone living inside my body, arms going numb.  Iodinated Contrast Media Hives     Pt states severe hives     Levaquin [Levofloxacin] Nausea Only    Lyrica [Pregabalin] Other (comments)     Shaking, feeling someone living in my body, numb arm.   Any meds for fibromyialga    Other Medication Swelling     Pt wrote having an allergic reaction to \"tetnus\". \"Extreme swelling at site\"     Sulfur Hives        Social History     Tobacco Use    Smoking status: Former Smoker     Packs/day: 1.00     Years: 45.00     Pack years: 45.00     Types: Cigarettes     Quit date: 2014     Years since quittin.8    Smokeless tobacco: Never Used   Vaping Use    Vaping Use: Never used   Substance Use Topics    Alcohol use: No    Drug use: No        Family History   Problem Relation Age of Onset    Hypertension Mother     Diabetes Mother     Cancer Father     Stroke Father        Review of Systems    General: negative for fever   Eyes: negative for vision loss   HENT: negative for cold symptoms   Respiratory negative for shortness of breath   Cardiac: negative for chest pain   Vascular negative for foot pain at night    Gastrointestinal: negative for abdominal pain   Genitourinary: negative for dysuria    Endocrine: negative for excessive thirst   Skin: negative for rash   Neurological: negative for paralysis   Psychiatric: negative for depression        Physical Exam:    Visit Vitals  /80 (BP 1 Location: Left upper arm, BP Patient Position: Sitting)   Pulse 64   Ht 5' 2\" (1.575 m)   Wt 178 lb (80.7 kg)   SpO2 96%   BMI 32.56 kg/m²        Constitutional:  Patient is well developed, well nourished, and not distressed. HEENT: atraumatic, normocephalic, wearing a mask. Eyes:   Cunjunctivae clear, no scleral icterus  Cardiovascular:  Normal rate, regular rhythm, normal heart sounds. Pulmonary/Chest: Effort normal and breath sounds normal.  she has no wheezes or no rales. Abdominal:   Soft. No tenderness to palpation. Extremities: No edema. Neurological:  she  is alert and oriented x3 Gait normal. Motor & sensory grossly intact in all 4 limbs. Psych: Appropriate mood and affect.      Imaging/Studies:  CT ch/a/p (noncon): descending thoracic aortic aneurysm measuring 3.8 cm at the diaphragm. Abdominal aorta measures approx 3 cm. Ascending aorta at 3.5. Impression:  -Clinically doing well with essentially stable descending TAA and AAA. -Small lung nodule of the LLL - radiology recommended annual f/u.     Plan:  Repeat surveillance with noncon CT chest/a/p in 1 year (ordered)  F/u in clinic in 1 year      Rakesh Harper MD  Vascular Surgeon

## 2022-01-24 ENCOUNTER — TRANSCRIBE ORDER (OUTPATIENT)
Dept: SCHEDULING | Age: 72
End: 2022-01-24

## 2022-01-24 DIAGNOSIS — Z12.31 VISIT FOR SCREENING MAMMOGRAM: Primary | ICD-10-CM

## 2022-01-26 ENCOUNTER — HOSPITAL ENCOUNTER (OUTPATIENT)
Dept: MAMMOGRAPHY | Age: 72
Discharge: HOME | End: 2022-01-26
Attending: FAMILY MEDICINE
Payer: MEDICARE

## 2022-01-26 DIAGNOSIS — Z12.31 VISIT FOR SCREENING MAMMOGRAM: ICD-10-CM

## 2022-01-26 PROCEDURE — 77063 BREAST TOMOSYNTHESIS BI: CPT

## 2022-03-02 ENCOUNTER — TRANSCRIBE ORDER (OUTPATIENT)
Dept: SCHEDULING | Age: 72
End: 2022-03-02

## 2022-03-02 DIAGNOSIS — M85.80 OSTEOPENIA: Primary | ICD-10-CM

## 2022-03-18 PROBLEM — M47.812 CERVICAL FACET SYNDROME: Status: ACTIVE | Noted: 2017-06-28

## 2022-03-18 PROBLEM — M47.26 OSTEOARTHRITIS OF SPINE WITH RADICULOPATHY, LUMBAR REGION: Status: ACTIVE | Noted: 2017-06-07

## 2022-03-19 PROBLEM — G47.34 NOCTURNAL HYPOXEMIA: Status: ACTIVE | Noted: 2020-06-30

## 2022-03-19 PROBLEM — M79.7 FIBROMYALGIA: Status: ACTIVE | Noted: 2017-06-07

## 2022-03-19 PROBLEM — M47.816 SPONDYLOSIS OF LUMBAR REGION WITHOUT MYELOPATHY OR RADICULOPATHY: Status: ACTIVE | Noted: 2017-06-28

## 2022-03-19 PROBLEM — M50.30 DEGENERATIVE DISC DISEASE, CERVICAL: Status: ACTIVE | Noted: 2017-09-14

## 2022-03-19 PROBLEM — M50.90 CERVICAL DISC DISEASE: Status: ACTIVE | Noted: 2017-06-07

## 2022-03-19 PROBLEM — M47.812 SPONDYLOSIS OF CERVICAL REGION WITHOUT MYELOPATHY OR RADICULOPATHY: Status: ACTIVE | Noted: 2017-06-28

## 2022-03-19 PROBLEM — M51.36 LUMBAR DEGENERATIVE DISC DISEASE: Status: ACTIVE | Noted: 2017-06-28

## 2022-03-19 PROBLEM — M51.369 LUMBAR DEGENERATIVE DISC DISEASE: Status: ACTIVE | Noted: 2017-06-28

## 2022-03-19 PROBLEM — G89.4 CHRONIC PAIN SYNDROME: Status: ACTIVE | Noted: 2017-06-28

## 2022-03-19 PROBLEM — G62.9 NEUROPATHY: Status: ACTIVE | Noted: 2017-06-07

## 2022-03-31 ENCOUNTER — TRANSCRIBE ORDER (OUTPATIENT)
Dept: SCHEDULING | Age: 72
End: 2022-03-31

## 2022-03-31 DIAGNOSIS — M81.0 POSTMENOPAUSAL OSTEOPOROSIS: Primary | ICD-10-CM

## 2022-03-31 DIAGNOSIS — Z13.820 SPECIAL SCREENING FOR OSTEOPOROSIS: Primary | ICD-10-CM

## 2022-03-31 DIAGNOSIS — M85.80 OSTEOPENIA: Primary | ICD-10-CM

## 2022-04-26 ENCOUNTER — HOSPITAL ENCOUNTER (OUTPATIENT)
Dept: BONE DENSITY | Age: 72
Discharge: HOME | End: 2022-04-26
Attending: NURSE PRACTITIONER
Payer: MEDICARE

## 2022-04-26 DIAGNOSIS — M81.0 POSTMENOPAUSAL OSTEOPOROSIS: ICD-10-CM

## 2022-04-26 PROCEDURE — 77080 DXA BONE DENSITY AXIAL: CPT

## 2022-05-20 ENCOUNTER — HOSPITAL ENCOUNTER (OUTPATIENT)
Dept: LAB | Age: 72
Discharge: HOME | End: 2022-05-20
Payer: MEDICARE

## 2022-05-20 LAB
ALBUMIN SERPL-MCNC: 4.3 G/DL (ref 3.4–5)
ALBUMIN/GLOB SERPL: 1.3 {RATIO} (ref 0.8–1.7)
ALP SERPL-CCNC: 107 U/L (ref 45–117)
ALT SERPL-CCNC: 32 U/L (ref 13–56)
ANION GAP SERPL CALC-SCNC: 7 MMOL/L (ref 3–18)
AST SERPL-CCNC: 39 U/L (ref 10–38)
BASOPHILS # BLD: 0.1 K/UL (ref 0–0.1)
BASOPHILS NFR BLD: 1 % (ref 0–2)
BILIRUB SERPL-MCNC: 0.7 MG/DL (ref 0.2–1)
BUN SERPL-MCNC: 15 MG/DL (ref 7–18)
BUN/CREAT SERPL: 12 (ref 12–20)
CALCIUM SERPL-MCNC: 9.3 MG/DL (ref 8.5–10.1)
CHLORIDE SERPL-SCNC: 102 MMOL/L (ref 100–111)
CHOLEST SERPL-MCNC: 156 MG/DL
CO2 SERPL-SCNC: 32 MMOL/L (ref 21–32)
CREAT SERPL-MCNC: 1.25 MG/DL (ref 0.6–1.3)
DIFFERENTIAL METHOD BLD: ABNORMAL
EOSINOPHIL # BLD: 0.3 K/UL (ref 0–0.4)
EOSINOPHIL NFR BLD: 4 % (ref 0–5)
ERYTHROCYTE [DISTWIDTH] IN BLOOD BY AUTOMATED COUNT: 14.3 % (ref 11.6–14.5)
GLOBULIN SER CALC-MCNC: 3.3 G/DL (ref 2–4)
GLUCOSE SERPL-MCNC: 101 MG/DL (ref 74–99)
HBA1C MFR BLD: 5.9 % (ref 4.2–5.6)
HCT VFR BLD AUTO: 40.5 % (ref 35–45)
HDLC SERPL-MCNC: 61 MG/DL (ref 40–60)
HDLC SERPL: 2.6 {RATIO} (ref 0–5)
HGB BLD-MCNC: 13 G/DL (ref 12–16)
IMM GRANULOCYTES # BLD AUTO: 0 K/UL (ref 0–0.04)
IMM GRANULOCYTES NFR BLD AUTO: 0 % (ref 0–0.5)
LDLC SERPL CALC-MCNC: 58.6 MG/DL (ref 0–100)
LIPID PROFILE,FLP: ABNORMAL
LYMPHOCYTES # BLD: 1.8 K/UL (ref 0.9–3.6)
LYMPHOCYTES NFR BLD: 27 % (ref 21–52)
MCH RBC QN AUTO: 30.6 PG (ref 24–34)
MCHC RBC AUTO-ENTMCNC: 32.1 G/DL (ref 31–37)
MCV RBC AUTO: 95.3 FL (ref 78–100)
MONOCYTES # BLD: 0.7 K/UL (ref 0.05–1.2)
MONOCYTES NFR BLD: 11 % (ref 3–10)
NEUTS SEG # BLD: 3.8 K/UL (ref 1.8–8)
NEUTS SEG NFR BLD: 57 % (ref 40–73)
NRBC # BLD: 0 K/UL (ref 0–0.01)
NRBC BLD-RTO: 0 PER 100 WBC
PLATELET # BLD AUTO: 211 K/UL (ref 135–420)
PMV BLD AUTO: 11.1 FL (ref 9.2–11.8)
POTASSIUM SERPL-SCNC: 3.7 MMOL/L (ref 3.5–5.5)
PROT SERPL-MCNC: 7.6 G/DL (ref 6.4–8.2)
RBC # BLD AUTO: 4.25 M/UL (ref 4.2–5.3)
SODIUM SERPL-SCNC: 141 MMOL/L (ref 136–145)
T4 FREE SERPL-MCNC: 1 NG/DL (ref 0.7–1.5)
TRIGL SERPL-MCNC: 182 MG/DL (ref ?–150)
TSH SERPL DL<=0.05 MIU/L-ACNC: 0.97 UIU/ML (ref 0.36–3.74)
VLDLC SERPL CALC-MCNC: 36.4 MG/DL
WBC # BLD AUTO: 6.6 K/UL (ref 4.6–13.2)

## 2022-05-20 PROCEDURE — 84439 ASSAY OF FREE THYROXINE: CPT

## 2022-05-20 PROCEDURE — 36415 COLL VENOUS BLD VENIPUNCTURE: CPT

## 2022-05-20 PROCEDURE — 83036 HEMOGLOBIN GLYCOSYLATED A1C: CPT

## 2022-05-20 PROCEDURE — 85025 COMPLETE CBC W/AUTO DIFF WBC: CPT

## 2022-05-20 PROCEDURE — 80061 LIPID PANEL: CPT

## 2022-05-20 PROCEDURE — 80053 COMPREHEN METABOLIC PANEL: CPT

## 2022-06-14 ENCOUNTER — OFFICE VISIT (OUTPATIENT)
Dept: PULMONOLOGY | Age: 72
End: 2022-06-14
Payer: MEDICARE

## 2022-06-14 VITALS
TEMPERATURE: 97.1 F | DIASTOLIC BLOOD PRESSURE: 57 MMHG | BODY MASS INDEX: 32.83 KG/M2 | RESPIRATION RATE: 16 BRPM | OXYGEN SATURATION: 92 % | HEART RATE: 69 BPM | HEIGHT: 62 IN | SYSTOLIC BLOOD PRESSURE: 99 MMHG | WEIGHT: 178.4 LBS

## 2022-06-14 DIAGNOSIS — J44.9 BRONCHITIS WITH CHRONIC AIRWAY OBSTRUCTION (HCC): Primary | ICD-10-CM

## 2022-06-14 DIAGNOSIS — R91.1 INCIDENTAL LUNG NODULE, > 3MM AND < 8MM: ICD-10-CM

## 2022-06-14 DIAGNOSIS — R06.02 SOB (SHORTNESS OF BREATH) ON EXERTION: ICD-10-CM

## 2022-06-14 DIAGNOSIS — G47.34 NOCTURNAL HYPOXEMIA: ICD-10-CM

## 2022-06-14 PROCEDURE — G8754 DIAS BP LESS 90: HCPCS | Performed by: INTERNAL MEDICINE

## 2022-06-14 PROCEDURE — G8432 DEP SCR NOT DOC, RNG: HCPCS | Performed by: INTERNAL MEDICINE

## 2022-06-14 PROCEDURE — G8417 CALC BMI ABV UP PARAM F/U: HCPCS | Performed by: INTERNAL MEDICINE

## 2022-06-14 PROCEDURE — G8399 PT W/DXA RESULTS DOCUMENT: HCPCS | Performed by: INTERNAL MEDICINE

## 2022-06-14 PROCEDURE — G8536 NO DOC ELDER MAL SCRN: HCPCS | Performed by: INTERNAL MEDICINE

## 2022-06-14 PROCEDURE — 99214 OFFICE O/P EST MOD 30 MIN: CPT | Performed by: INTERNAL MEDICINE

## 2022-06-14 PROCEDURE — 1101F PT FALLS ASSESS-DOCD LE1/YR: CPT | Performed by: INTERNAL MEDICINE

## 2022-06-14 PROCEDURE — 1090F PRES/ABSN URINE INCON ASSESS: CPT | Performed by: INTERNAL MEDICINE

## 2022-06-14 PROCEDURE — 3017F COLORECTAL CA SCREEN DOC REV: CPT | Performed by: INTERNAL MEDICINE

## 2022-06-14 PROCEDURE — G8427 DOCREV CUR MEDS BY ELIG CLIN: HCPCS | Performed by: INTERNAL MEDICINE

## 2022-06-14 PROCEDURE — 1123F ACP DISCUSS/DSCN MKR DOCD: CPT | Performed by: INTERNAL MEDICINE

## 2022-06-14 PROCEDURE — G8752 SYS BP LESS 140: HCPCS | Performed by: INTERNAL MEDICINE

## 2022-06-14 PROCEDURE — G9899 SCRN MAM PERF RSLTS DOC: HCPCS | Performed by: INTERNAL MEDICINE

## 2022-06-14 NOTE — PROGRESS NOTES
[facility]  Pulmonary, Critical Care, and Sleep Medicine      Pulmonary Office visit. Name: Jez Dominguez     : 1950     Date: 2022        Subjective:     Patient is a 66 y.o. female is here for follow-up after recent diagnosis of COVID and evaluation for progressive shortness of breath  follow up-evaluation for need for nocturnal Oxygen supplementation, evaluation of an abnormal Ct scan of chest.    22   Patient has been complaining of worsening shortness of breath over several months. She had ENT procedure completed with balloon sinuplasty-did not notice any difference in her breathing. She has also had cardiac work-up completed and no cardiac etiology for her dyspnea noted  She now wants to check her PFTs to ensure there is no progression  About a month ago she had exposure at [employer] from COVID-multiple members were diagnosed with influenza and COVID. Patient herself experienced the worst body aches she has ever felt with persistent weakness and cough. About 2 weeks back she had retested positive but 3 days back tested negative. She is more or less completely recovered from her acute illness and is here for follow-up    Has some dry cough which occurs randomly but subsides  She had SOB with activity but no PND or orthopnea. No fever or chills. Using night time O2  Had titration study completed- results suggest nocturnal Oxygen desaturation despite CPAP 9 cm. Needs 2 L Oxygen in addition. On Singulair, flonase  She also has h/o allergies and is receiving allergy immunotherapy by Dr. Ally Angulo. HPI:  Followed from 2014 To 10/2014. Most recent follow up Ct scan 2015 with stability in nodule. She had 24 month follow up CT completed and is here to discuss results. She also had follow up Sleep evaluation- Dr. Michelle Mcgovern who reports nocturnal hypoxemia on initial study and follow up titration study. Patient was asked to follow up with Pulmonology.    She has smoked less than 1 ppd for past 47+ years and eventually quit smoking in 2013  She denies any sleep related problems, leg swelling. Denies joint pains. 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 H/o rheumatic fever in childhood. Past Medical History:   Diagnosis Date    Arthritis     Chronic lung disease     Chronic obstructive pulmonary disease ([address]     Descending aortic aneurysm (HCC)     Fibromyalgia     Hypercholesterolemia     Hypertension     Ill-defined condition     Oxygen dependent     at night [facility]    Sleep apnea     mouth guard, oxygen HS       Past Surgical History:   Procedure Laterality Date    COLONOSCOPY N/A 7/3/2017    COLONOSCOPY with biopsies performed by Po Garnett MD at [facility]    HX APPENDECTOMY      HX BREAST BIOPSY Left     HX CATARACT REMOVAL      HX TUBAL LIGATION       Allergies   Allergen Reactions    Benadryl [Diphenhydramine Hcl] Hives    Codeine Itching    Cymbalta [Duloxetine] Other (comments)     Shaking, feeling someone living inside my body, arms going numb.  Iodinated Contrast Media Hives     Pt states severe hives     Levaquin [Levofloxacin] Nausea Only    Lyrica [Pregabalin] Other (comments)     Shaking, feeling someone living in my body, numb arm. Any meds for fibromyialga    Other Medication Swelling     Pt wrote having an allergic reaction to \"tetnus\". \"Extreme swelling at site\"     Sulfur Hives     Current Outpatient Medications   Medication Sig Dispense Refill    HYDROcodone-acetaminophen (NORCO) 5-325 mg per tablet Take 1 Tab by mouth every eight (8) hours as needed.  cholecalciferol (VITAMIN D3) (5000 Units/125 mcg) tab tablet Take 5,000 Units by mouth daily.  cyclobenzaprine (FLEXERIL) 10 mg tablet Take 5 mg by mouth three (3) times daily as needed.  magnesium 250 mg tab Take 125 mg by mouth daily.  niacin (SLO-NIACIN) 500 mg ER Tablet Take 2 Tabs by mouth nightly.       OXYGEN-AIR DELIVERY SYSTEMS 2 L/min by Does Not Apply route nightly. Indications: DME: [facility]      Flaxseed Oil oil 1 Tab by Does Not Apply route daily.  NIFEdipine ER (PROCARDIA XL) 30 mg ER tablet Take 30 mg by mouth daily.  atenolol (TENORMIN) 50 mg tablet Take 25 mg by mouth daily.  atorvastatin (LIPITOR) 40 mg tablet Take 80 mg by mouth daily.  zolpidem (AMBIEN) 10 mg tablet Take 5 mg by mouth nightly as needed for Sleep.  levocetirizine (XYZAL) 5 mg tablet Take 5 mg by mouth daily.  montelukast (SINGULAIR) 10 mg tablet Take 10 mg by mouth nightly.  multivitamin (ONE A DAY) tablet Take 1 Tab by mouth daily.  aspirin delayed-release 81 mg tablet Take 81 mg by mouth daily.  VITAMIN B COMPLEX (B COMPLETE PO) Take 1 Tab by mouth daily.  potassium 99 mg tablet Take 45.5 mg by mouth daily.  tiotropium bromide (Spiriva Respimat) 1.25 mcg/actuation inhaler Take 2 Puffs by inhalation daily. (Patient not taking: Reported on 11/4/2021) 4 g 0    ALPRAZolam (XANAX) 0.25 mg tablet Take 0.25 mg by mouth nightly as needed. (Patient not taking: Reported on 6/14/2022)      pantoprazole (PROTONIX) 40 mg tablet Take 40 mg by mouth two (2) times a day. (Patient not taking: Reported on 6/14/2022)      fluticasone (FLONASE) 50 mcg/actuation nasal spray 2 Sprays by Both Nostrils route two (2) times daily as needed.  (Patient not taking: Reported on 6/14/2022)       Review of Systems:  HEENT: No epistaxis, no nasal drainage, no difficulty in swallowing, no redness in eyes  Respiratory: as above  Cardiovascular: no chest pain, no palpitations, no chronic leg edema, no syncope  Gastrointestinal: no abd pain, no vomiting, no diarrhea, no bleeding symptoms  Genitourinary: No urinary symptoms or hematuria  Integument/breast: No ulcers or rashes  Musculoskeletal:Neg  Neurological: No focal weakness, no seizures, no headaches  Behvioral/Psych: No anxiety, no depression  Constitutional: No fever, no chills, no weight loss, no night sweats     Objective:     Visit Vitals  BP (!) 99/57 (BP 1 Location: Left upper arm, BP Patient Position: Sitting, BP Cuff Size: Large adult)   Pulse 69   Temp 97.1 °F (36.2 °C)   Resp 16   Ht 5' 2\" (1.575 m)   Wt 80.9 kg (178 lb 6.4 oz)   SpO2 92% Comment: RA Rest   BMI 32.63 kg/m²        Physical Exam:   General: comfortable, no acute distress  HEENT: pupils reactive, sclera anicteric, EOM intact  Neck: No adenopathy or thyroid swelling, no lymphadenopathy or JVD, supple  CVS: S1S2 no murmurs  RS: Mod AE bilaterally, no tactile fremitus or egophony, no accessory muscle use  Abd: soft, non tender, no hepatosplenomegaly  Neuro: non focal, awake, alert  Extrm: no leg edema, clubbing or cyanosis  Skin: no rash    Data review:   PFT's:  Flows:  Maximal Mid Expiratory Flow rate is reduced to 48 % predicted  Forced Expiratory Volume in one second is normal  FEV 1% is reduced  Volumes:  Normal Volumes  Flow Volume Loop:  Reduced Terminal Flows in the Flow Volume Loop  Bronchodilator:  Significant improvement with bronchodilator  Diffusion:  Low Normal Diffusion Capacity  Impression:  Mild to Moderate obstructive defect, predominately small airways    Imaging:  I have personally reviewed the patients radiographs and have reviewed the reports:  CT Results (most recent):  Results from Hospital Encounter encounter on 10/27/21    CT CHEST ABD PELV WO CONT    Narrative  EXAM: CT CHEST ABD PELV WO CONT    INDICATION: Descending aortic aneurysm. AAA without rupture. COMPARISON: 10/19/2020. CONTRAST: None. TECHNIQUE: Multislice helical CT was performed from the base of neck through the  initial tuberosities without intravenous contrast administration. Contiguous  2.5 mm axial images were reconstructed and lung and soft tissue windows were  generated. Coronal and sagittal reformations were generated.   CT dose reduction  was achieved through use of a standardized protocol tailored for this  examination and automatic exposure control for dose modulation. FINDINGS:    CT CHEST:    Base of neck: Thyroid gland unremarkable. No adenopathy. LUNGS: There is a 4 mm groundglass nodule left lower lobe (30), stable. There is  a nodule on the minor fissure measuring 5 mm (36), stable. There is subsegmental  band of opacity left lower lobe, inferiorly right middle lobe. Multiple linear  densities both bases. No architectural distortion. Airways: Patent. Heart and pericardium: Mild burden coronary artery disease. Aorta: Tortuous thoracic aorta. Ascending aorta measures 3.5 mm. Aortic isthmus  measures 3.0 cm. Distal transverse arch measures 3.1 cm. Descending thoracic  aorta measures 2.6 cm. Aorta at hiatus measures 3.8 cm, previously 3.6 cm. Upper abdominal aorta measures 2.5 cm. Aorta infrarenal abdominal aorta measures  3.0 cm, previously 3.1 cm. .    Other great vessels: Interruption of IVC with azygous continuation. Large azygos  arch. CHEST WALL: Symmetric appearance glandular tissue at the breasts. CT of abdomen and pelvis:    Liver: No focal lesions. Gallbladder: Normal.    Spleen: Normal.    Pancreas: Normal.    Adrenal glands: Normal.    Kidneys: No hydronephrosis or nephrolithiasis. Retroperitoneum: Azygos continuation IVC. Dedicated IVC. Left IVC draining to  the right side posterior to the aorta at the L1 level. Lymph nodes: No adenopathy. Bowel: Esophagus and stomach and duodenum and small bowel are normal. Colon is  normal.    : Uterus normal. No adnexal masses. Urinary bladder nearly empty but as such  appears unremarkable. Peritoneal space: No free fluid. MSK: Moderate disc space narrowing L4/L5 with grade 1 anterolisthesis. No  suspicious skeletal lesions. Impression  Tortuous aorta with aneurysmal dilatation of distal thoracic aorta  at the level of the hiatus and of the infrarenal abdominal aorta.  Similar  appearance to prior.    Stable groundglass nodule left lower lobe. Consider additional follow-up CT in  one year. Scarring at the lung bases. 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 CT scan 10/2017:  6 mm right upper lobe nodule against the minor fissure is stable over the  last 3 years, benign    CT scan of chest ( [facility]-) 5/2016. LUNGS:  Nodules:  -5 mm perifissural nodule along the right minor fissure (image 131), solid circumscribed, likely an intrapulmonary lymph node. -5 mm groundglass nodule right lung apex (image 56). Other pulmonary findings:  Patchy coarse reticular opacification in the mid and lower lungs, scarring and/or subsegmental atelectasis. Few regions of mild cylindrical bronchiectasis. OTHER:   No significant incidental findings. CT scan 7/2015:  Stable pulmonary nodule along the minor fissure and stable borderline  precarinal mediastinal lymph node since 9/2014. Ct scan of chest:6/2014:C  There is a 0.5 x 0.4 cm noncalcified ovoid density in the right upper lobe   inferior aspect abutting the minor fissure (image #32). No dominant lung mass   is detected. Subsegmental atelectatic changes are identified in the right   middle lobe and the right lower lobe. Less pronounced subtle subsegmental   atelectatic changes also identified in the lingular region and left lung base. No focal infiltrate or consolidation is observed. Ct scan of abd/chest- 9/2014;  1. Stable right pulmonary nodule. 2. The descending thoracic aortic aneurysm is stable. Ectasia of the ascending   aorta is stable. 3. No evidence for bowel obstruction or inflammation. Inspissated stool in the   descending colon could be the result of constipation. 4. 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 2-D echo:  Left ventricle: Size was normal. Systolic function was normal by EF  (biplane method of disks). Ejection fraction was estimated to be 55 %.   There were no regional wall motion abnormalities. Wall thickness was  normal. Doppler parameters were consistent with abnormal left ventricular  relaxation (grade 1 diastolic dysfunction). Right ventricle: Systolic pressure was mildly increased. Estimated peak  pressure was 35 mmHg. Left atrium: The atrium was mildly dilated. Mitral valve: There was mild annular calcification. Tricuspid valve: There was trace to mild regurgitation. Pulmonary arteries: Systolic pressure was mildly increased. IMPRESSION:     · Asthma and obstructive bronchitis also with some radiologic findings consistent with chronic atelectasis. Progression of symptoms with shortness of breath  · S/p COVID-likely also had concomitant flu given the extent of her symptoms. Recovered  · Nocturnal hypoxemia-most recent overnight oximetry continues to show nocturnal oxygen desaturation needing supplemental oxygen  · Lung nodule- incidental finding of 0.5x0.4 cm RUL nodule in a patient who has been a smoker 52 PPD. High risk for malignancy and needs appropriate follow up. 24 month stability demonstrated. Discussed report with patient. Need to follow up annually per USTFP recommendations- low dose lung CT screening protocol or can accept Ct done for evaluation of Aneurysm. 6 mm right upper lobe nodule against the minor fissure -stable  · Mild pulmonary HTN   · Allergic rhinitis -on Flonase and allergy injections  · Esophageal stricture- s/p dilatation  And h/o hiatal hernia ? GERD with silent aspiration as cause for subtle basal interstitial fibrotic changes  · HTN  · Fusiform aneurysm of the distal descending aorta- vascular surgery following  · hyperlipidemia      RECOMMENDATIONS:   We will check PFTs-after 2 weeks from now giving time for recovery from acute illness  Continue Nocturnal oxygen supplementation based on documented Oxygen desaturation during sleep despite LESLIE corrective devices including CPAP.  Qualifying study completed on 6/3/2020-I have provided her with a face tent to use in the postop period To avoid contact with the nose  This patient has hypoxia related clinical diagnosis that will improve with oxygen therapy. Other treatment measures have been tried and have been ineffective.   We will prescribe Spiriva Respimat 1.25 mcg  Continue prn albuterol  Antireflux therapy and continued interventions- dietary and medications  Maintain smoking cessation- congratulated on success  Preventive vaccinations  Will follow up   Questions and concerns addressed          Yudy Roth MD

## 2022-06-14 NOTE — LETTER
6/14/2022    Patient: Wero Downey   YOB: 1950   Date of Visit: 6/14/2022     Sherri Lisa MD  [address] [facility]  [address] [place] [id number]  Via Fax: [phone]    Dear Sherri Lisa MD,      Thank you for referring Ms. Rigoberto Kowalski to [address] [place] [address] for evaluation. My notes for this consultation are attached. If you have questions, please do not hesitate to call me. I look forward to following your patient along with you.       Sincerely,    Yakov Germain MD

## 2022-06-14 NOTE — PROGRESS NOTES
Dimitri Stuart presents today for   Chief Complaint   Patient presents with    Follow-up    Shortness of Breath       Is someone accompanying this pt? No    Is the patient using any DME equipment during [address]? 2 L @ Night   -D[employer]    Depression Screening:  3 most recent PHQ Screens 11/4/2021   Little interest or pleasure in doing things Not at all   Feeling down, depressed, irritable, or hopeless Not at all   Total Score PHQ 2 0       Learning Assessment:  Learning Assessment 11/4/2021   PRIMARY LEARNER Patient   CO-LEARNER CAREGIVER -   PRIMARY LANGUAGE ENGLISH   LEARNER PREFERENCE PRIMARY DEMONSTRATION   ANSWERED BY Patient    RELATIONSHIP SELF       Abuse Screening:  Abuse Screening Questionnaire 3/3/2020   Do you ever feel afraid of your partner? N   Are you in a relationship with someone who physically or mentally threatens you? N   Is it safe for you to go home? Y       Fall Risk  Fall Risk Assessment, last 12 mths 11/4/2021   Able to walk? Yes   Fall in past 12 months? 0   Do you feel unsteady? 0   Are you worried about falling 0         Coordination of Care:  1. Have you been to the ER, urgent care clinic since your last visit? Hospitalized since your last visit? Sinus Surgery 10/2021    2. Have you seen or consulted any other health care providers outside of the [address] [place] [address] since your last visit? Include any pap smears or colon screening.  ENT-[facility] Cardiologist-[place]

## 2022-06-19 ENCOUNTER — APPOINTMENT (OUTPATIENT)
Dept: GENERAL RADIOLOGY | Age: 72
DRG: 392 | End: 2022-06-19
Attending: STUDENT IN AN ORGANIZED HEALTH CARE EDUCATION/TRAINING PROGRAM
Payer: MEDICARE

## 2022-06-19 ENCOUNTER — HOSPITAL ENCOUNTER (INPATIENT)
Age: 72
LOS: 1 days | Discharge: HOME | DRG: 392 | End: 2022-06-21
Attending: STUDENT IN AN ORGANIZED HEALTH CARE EDUCATION/TRAINING PROGRAM | Admitting: STUDENT IN AN ORGANIZED HEALTH CARE EDUCATION/TRAINING PROGRAM
Payer: MEDICARE

## 2022-06-19 DIAGNOSIS — R07.9 CHEST PAIN, UNSPECIFIED TYPE: Primary | ICD-10-CM

## 2022-06-19 LAB
ALBUMIN SERPL-MCNC: 3.6 G/DL (ref 3.4–5)
ALBUMIN/GLOB SERPL: 1.1 {RATIO} (ref 0.8–1.7)
ALP SERPL-CCNC: 104 U/L (ref 45–117)
ALT SERPL-CCNC: 27 U/L (ref 13–56)
ANION GAP SERPL CALC-SCNC: 5 MMOL/L (ref 3–18)
AST SERPL-CCNC: 32 U/L (ref 10–38)
BASOPHILS # BLD: 0.1 K/UL (ref 0–0.1)
BASOPHILS NFR BLD: 1 % (ref 0–2)
BILIRUB SERPL-MCNC: 0.4 MG/DL (ref 0.2–1)
BUN SERPL-MCNC: 11 MG/DL (ref 7–18)
BUN/CREAT SERPL: 11 (ref 12–20)
CALCIUM SERPL-MCNC: 8.8 MG/DL (ref 8.5–10.1)
CHLORIDE SERPL-SCNC: 106 MMOL/L (ref 100–111)
CO2 SERPL-SCNC: 31 MMOL/L (ref 21–32)
CREAT SERPL-MCNC: 1.01 MG/DL (ref 0.6–1.3)
DIFFERENTIAL METHOD BLD: ABNORMAL
EOSINOPHIL # BLD: 0.3 K/UL (ref 0–0.4)
EOSINOPHIL NFR BLD: 5 % (ref 0–5)
ERYTHROCYTE [DISTWIDTH] IN BLOOD BY AUTOMATED COUNT: 13.8 % (ref 11.6–14.5)
GLOBULIN SER CALC-MCNC: 3.2 G/DL (ref 2–4)
GLUCOSE SERPL-MCNC: 141 MG/DL (ref 74–99)
HCT VFR BLD AUTO: 36.9 % (ref 35–45)
HGB BLD-MCNC: 11.9 G/DL (ref 12–16)
IMM GRANULOCYTES # BLD AUTO: 0 K/UL (ref 0–0.04)
IMM GRANULOCYTES NFR BLD AUTO: 0 % (ref 0–0.5)
LYMPHOCYTES # BLD: 2.1 K/UL (ref 0.9–3.6)
LYMPHOCYTES NFR BLD: 31 % (ref 21–52)
MCH RBC QN AUTO: 30.7 PG (ref 24–34)
MCHC RBC AUTO-ENTMCNC: 32.2 G/DL (ref 31–37)
MCV RBC AUTO: 95.1 FL (ref 78–100)
MONOCYTES # BLD: 0.6 K/UL (ref 0.05–1.2)
MONOCYTES NFR BLD: 10 % (ref 3–10)
NEUTS SEG # BLD: 3.6 K/UL (ref 1.8–8)
NEUTS SEG NFR BLD: 54 % (ref 40–73)
NRBC # BLD: 0 K/UL (ref 0–0.01)
NRBC BLD-RTO: 0 PER 100 WBC
PLATELET # BLD AUTO: 184 K/UL (ref 135–420)
PMV BLD AUTO: 10.7 FL (ref 9.2–11.8)
POTASSIUM SERPL-SCNC: 3.9 MMOL/L (ref 3.5–5.5)
PROT SERPL-MCNC: 6.8 G/DL (ref 6.4–8.2)
RBC # BLD AUTO: 3.88 M/UL (ref 4.2–5.3)
SODIUM SERPL-SCNC: 142 MMOL/L (ref 136–145)
TROPONIN-HIGH SENSITIVITY: 20 NG/L (ref 0–54)
TROPONIN-HIGH SENSITIVITY: 7 NG/L (ref 0–54)
WBC # BLD AUTO: 6.6 K/UL (ref 4.6–13.2)

## 2022-06-19 PROCEDURE — 96374 THER/PROPH/DIAG INJ IV PUSH: CPT

## 2022-06-19 PROCEDURE — 84484 ASSAY OF TROPONIN QUANT: CPT

## 2022-06-19 PROCEDURE — 93005 ELECTROCARDIOGRAM TRACING: CPT

## 2022-06-19 PROCEDURE — 80053 COMPREHEN METABOLIC PANEL: CPT

## 2022-06-19 PROCEDURE — 99285 EMERGENCY DEPT VISIT HI MDM: CPT

## 2022-06-19 PROCEDURE — 71045 X-RAY EXAM CHEST 1 VIEW: CPT

## 2022-06-19 PROCEDURE — 74011250636 HC RX REV CODE- 250/636: Performed by: STUDENT IN AN ORGANIZED HEALTH CARE EDUCATION/TRAINING PROGRAM

## 2022-06-19 PROCEDURE — 85025 COMPLETE CBC W/AUTO DIFF WBC: CPT

## 2022-06-19 RX ORDER — HYDROCORTISONE SODIUM SUCCINATE 100 MG/2ML
200 INJECTION, POWDER, FOR SOLUTION INTRAMUSCULAR; INTRAVENOUS ONCE
Status: COMPLETED | OUTPATIENT
Start: 2022-06-19 | End: 2022-06-19

## 2022-06-19 RX ORDER — LABETALOL HCL 20 MG/4 ML
20 SYRINGE (ML) INTRAVENOUS ONCE
Status: DISPENSED | OUTPATIENT
Start: 2022-06-19 | End: 2022-06-20

## 2022-06-19 RX ORDER — DIPHENHYDRAMINE HYDROCHLORIDE 50 MG/ML
50 INJECTION, SOLUTION INTRAMUSCULAR; INTRAVENOUS ONCE
Status: COMPLETED | OUTPATIENT
Start: 2022-06-19 | End: 2022-06-20

## 2022-06-19 RX ADMIN — HYDROCORTISONE SODIUM SUCCINATE 200 MG: 100 INJECTION, POWDER, FOR SOLUTION INTRAMUSCULAR; INTRAVENOUS at 23:56

## 2022-06-19 NOTE — Clinical Note
Status[de-identified] INPATIENT [101]   Type of Bed: Telemetry [19]   Cardiac Monitoring Required?: Yes   Inpatient Hospitalization Certified Necessary for the Following Reasons: 3.  Patient receiving treatment that can only be provided in an inpatient setting (further clarification in H&P documentation)   Admitting Diagnosis: Chest pain [191811]   Admitting Physician: Davonte Fields [[id number]]   Attending Physician: Davonte Fields [[id number]]   Estimated Length of Stay: 2 Midnights   Discharge Plan[de-identified] Home with Office Follow-up

## 2022-06-20 ENCOUNTER — APPOINTMENT (OUTPATIENT)
Dept: NON INVASIVE DIAGNOSTICS | Age: 72
DRG: 392 | End: 2022-06-20
Attending: INTERNAL MEDICINE
Payer: MEDICARE

## 2022-06-20 ENCOUNTER — APPOINTMENT (OUTPATIENT)
Dept: CT IMAGING | Age: 72
DRG: 392 | End: 2022-06-20
Attending: STUDENT IN AN ORGANIZED HEALTH CARE EDUCATION/TRAINING PROGRAM
Payer: MEDICARE

## 2022-06-20 ENCOUNTER — APPOINTMENT (OUTPATIENT)
Dept: NON INVASIVE DIAGNOSTICS | Age: 72
DRG: 392 | End: 2022-06-20
Attending: STUDENT IN AN ORGANIZED HEALTH CARE EDUCATION/TRAINING PROGRAM
Payer: MEDICARE

## 2022-06-20 ENCOUNTER — HOSPITAL ENCOUNTER (EMERGENCY)
Dept: NUCLEAR MEDICINE | Age: 72
Discharge: HOME | DRG: 392 | End: 2022-06-20
Attending: INTERNAL MEDICINE
Payer: MEDICARE

## 2022-06-20 VITALS
HEIGHT: 62 IN | SYSTOLIC BLOOD PRESSURE: 133 MMHG | BODY MASS INDEX: 32.76 KG/M2 | WEIGHT: 178 LBS | DIASTOLIC BLOOD PRESSURE: 77 MMHG

## 2022-06-20 PROBLEM — R07.9 CHEST PAIN: Status: ACTIVE | Noted: 2022-06-20

## 2022-06-20 LAB
ATRIAL RATE: 58 BPM
ATRIAL RATE: 63 BPM
CALCULATED P AXIS, ECG09: 45 DEGREES
CALCULATED P AXIS, ECG09: 53 DEGREES
CALCULATED R AXIS, ECG10: -5 DEGREES
CALCULATED R AXIS, ECG10: -7 DEGREES
CALCULATED T AXIS, ECG11: 33 DEGREES
CALCULATED T AXIS, ECG11: 45 DEGREES
DIAGNOSIS, 93000: NORMAL
DIAGNOSIS, 93000: NORMAL
ECHO AR MAX VEL PISA: 4.4 M/S
ECHO AV AREA PEAK VELOCITY: 1.6 CM2
ECHO AV AREA PEAK VELOCITY: 1.8 CM2
ECHO AV AREA VTI: 1.9 CM2
ECHO AV AREA/BSA VTI: 1 CM2/M2
ECHO AV MEAN GRADIENT: 7 MMHG
ECHO AV MEAN VELOCITY: 1.3 M/S
ECHO AV PEAK GRADIENT: 12 MMHG
ECHO AV PEAK GRADIENT: 16 MMHG
ECHO AV PEAK VELOCITY: 1.8 M/S
ECHO AV PEAK VELOCITY: 2 M/S
ECHO AV REGURGITANT PHT: 565.4 MILLISECOND
ECHO AV VTI: 39.7 CM
ECHO EST RA PRESSURE: 3 MMHG
ECHO LA VOL 2C: 64 ML (ref 22–52)
ECHO LA VOL 4C: 52 ML (ref 22–52)
ECHO LA VOLUME AREA LENGTH: 62 ML
ECHO LA VOLUME INDEX A2C: 35 ML/M2 (ref 16–34)
ECHO LA VOLUME INDEX A4C: 29 ML/M2 (ref 16–34)
ECHO LA VOLUME INDEX AREA LENGTH: 34 ML/M2 (ref 16–34)
ECHO LV E' LATERAL VELOCITY: 4 CM/S
ECHO LV E' SEPTAL VELOCITY: 4 CM/S
ECHO LV FRACTIONAL SHORTENING: 31 % (ref 28–44)
ECHO LV INTERNAL DIMENSION DIASTOLE INDEX: 1.92 CM/M2
ECHO LV INTERNAL DIMENSION DIASTOLIC: 3.5 CM (ref 3.9–5.3)
ECHO LV INTERNAL DIMENSION SYSTOLIC INDEX: 1.32 CM/M2
ECHO LV INTERNAL DIMENSION SYSTOLIC: 2.4 CM
ECHO LV IVSD: 1.2 CM (ref 0.6–0.9)
ECHO LV MASS 2D: 135.8 G (ref 67–162)
ECHO LV MASS INDEX 2D: 74.6 G/M2 (ref 43–95)
ECHO LV POSTERIOR WALL DIASTOLIC: 1.2 CM (ref 0.6–0.9)
ECHO LV RELATIVE WALL THICKNESS RATIO: 0.69
ECHO LVOT AREA: 2.5 CM2
ECHO LVOT AV VTI INDEX: 0.79
ECHO LVOT DIAM: 1.8 CM
ECHO LVOT MEAN GRADIENT: 4 MMHG
ECHO LVOT PEAK GRADIENT: 7 MMHG
ECHO LVOT PEAK VELOCITY: 1.3 M/S
ECHO LVOT STROKE VOLUME INDEX: 43.7 ML/M2
ECHO LVOT SV: 79.6 ML
ECHO LVOT VTI: 31.3 CM
ECHO MV A VELOCITY: 0.91 M/S
ECHO MV E DECELERATION TIME (DT): 151.5 MS
ECHO MV E VELOCITY: 0.51 M/S
ECHO MV E/A RATIO: 0.56
ECHO MV E/E' LATERAL: 12.75
ECHO MV E/E' RATIO (AVERAGED): 12.75
ECHO MV E/E' SEPTAL: 12.75
ECHO PULMONARY ARTERY SYSTOLIC PRESSURE (PASP): 34 MMHG
ECHO RIGHT VENTRICULAR SYSTOLIC PRESSURE (RVSP): 34 MMHG
ECHO RV LONGITUDINAL DIMENSION: 4 CM
ECHO TV REGURGITANT MAX VELOCITY: 2.77 M/S
ECHO TV REGURGITANT PEAK GRADIENT: 31 MMHG
NUC STRESS EJECTION FRACTION: 81 %
P-R INTERVAL, ECG05: 156 MS
P-R INTERVAL, ECG05: 162 MS
Q-T INTERVAL, ECG07: 432 MS
Q-T INTERVAL, ECG07: 444 MS
QRS DURATION, ECG06: 80 MS
QRS DURATION, ECG06: 80 MS
QTC CALCULATION (BEZET), ECG08: 435 MS
QTC CALCULATION (BEZET), ECG08: 442 MS
STRESS BASELINE DIAS BP: 77 MMHG
STRESS BASELINE HR: 82 BPM
STRESS BASELINE SYS BP: 133 MMHG
STRESS ESTIMATED WORKLOAD: 1 METS
STRESS EXERCISE DUR MIN: 4 MIN
STRESS EXERCISE DUR SEC: 0 SEC
STRESS PEAK DIAS BP: 74 MMHG
STRESS PEAK SYS BP: 139 MMHG
STRESS PERCENT HR ACHIEVED: 81 %
STRESS POST PEAK HR: 120 BPM
STRESS RATE PRESSURE PRODUCT: NORMAL BPM*MMHG
STRESS TARGET HR: 148 BPM
TID: 0.94
TROPONIN-HIGH SENSITIVITY: 33 NG/L (ref 0–54)
TROPONIN-HIGH SENSITIVITY: 39 NG/L (ref 0–54)
VENTRICULAR RATE, ECG03: 58 BPM
VENTRICULAR RATE, ECG03: 63 BPM

## 2022-06-20 PROCEDURE — 77010033678 HC OXYGEN DAILY

## 2022-06-20 PROCEDURE — 84484 ASSAY OF TROPONIN QUANT: CPT

## 2022-06-20 PROCEDURE — 74011000636 HC RX REV CODE- 636: Performed by: STUDENT IN AN ORGANIZED HEALTH CARE EDUCATION/TRAINING PROGRAM

## 2022-06-20 PROCEDURE — 93306 TTE W/DOPPLER COMPLETE: CPT

## 2022-06-20 PROCEDURE — 99232 SBSQ HOSP IP/OBS MODERATE 35: CPT | Performed by: INTERNAL MEDICINE

## 2022-06-20 PROCEDURE — 74011250636 HC RX REV CODE- 250/636: Performed by: STUDENT IN AN ORGANIZED HEALTH CARE EDUCATION/TRAINING PROGRAM

## 2022-06-20 PROCEDURE — 99223 1ST HOSP IP/OBS HIGH 75: CPT | Performed by: INTERNAL MEDICINE

## 2022-06-20 PROCEDURE — A9502 TC99M TETROFOSMIN: HCPCS

## 2022-06-20 PROCEDURE — 99223 1ST HOSP IP/OBS HIGH 75: CPT | Performed by: STUDENT IN AN ORGANIZED HEALTH CARE EDUCATION/TRAINING PROGRAM

## 2022-06-20 PROCEDURE — 94762 N-INVAS EAR/PLS OXIMTRY CONT: CPT

## 2022-06-20 PROCEDURE — 74011000250 HC RX REV CODE- 250: Performed by: STUDENT IN AN ORGANIZED HEALTH CARE EDUCATION/TRAINING PROGRAM

## 2022-06-20 PROCEDURE — 71275 CT ANGIOGRAPHY CHEST: CPT

## 2022-06-20 PROCEDURE — 74011250636 HC RX REV CODE- 250/636: Performed by: INTERNAL MEDICINE

## 2022-06-20 PROCEDURE — 74011250637 HC RX REV CODE- 250/637: Performed by: STUDENT IN AN ORGANIZED HEALTH CARE EDUCATION/TRAINING PROGRAM

## 2022-06-20 PROCEDURE — 93017 CV STRESS TEST TRACING ONLY: CPT

## 2022-06-20 PROCEDURE — 65270000029 HC RM PRIVATE

## 2022-06-20 PROCEDURE — 96375 TX/PRO/DX INJ NEW DRUG ADDON: CPT

## 2022-06-20 RX ORDER — ENOXAPARIN SODIUM 100 MG/ML
40 INJECTION SUBCUTANEOUS DAILY
Status: DISCONTINUED | OUTPATIENT
Start: 2022-06-20 | End: 2022-06-21 | Stop reason: HOSPADM

## 2022-06-20 RX ORDER — NIFEDIPINE 30 MG/1
30 TABLET, EXTENDED RELEASE ORAL DAILY
Status: DISCONTINUED | OUTPATIENT
Start: 2022-06-21 | End: 2022-06-20

## 2022-06-20 RX ORDER — ATORVASTATIN CALCIUM 40 MG/1
80 TABLET, FILM COATED ORAL DAILY
Status: DISCONTINUED | OUTPATIENT
Start: 2022-06-20 | End: 2022-06-21

## 2022-06-20 RX ORDER — ACETAMINOPHEN 650 MG/1
650 SUPPOSITORY RECTAL
Status: DISCONTINUED | OUTPATIENT
Start: 2022-06-20 | End: 2022-06-21 | Stop reason: HOSPADM

## 2022-06-20 RX ORDER — ONDANSETRON 4 MG/1
4 TABLET, ORALLY DISINTEGRATING ORAL
Status: DISCONTINUED | OUTPATIENT
Start: 2022-06-20 | End: 2022-06-21 | Stop reason: HOSPADM

## 2022-06-20 RX ORDER — IPRATROPIUM BROMIDE 0.5 MG/2.5ML
0.5 SOLUTION RESPIRATORY (INHALATION) EVERY 8 HOURS
Status: DISCONTINUED | OUTPATIENT
Start: 2022-06-20 | End: 2022-06-21

## 2022-06-20 RX ORDER — PANTOPRAZOLE SODIUM 40 MG/1
40 TABLET, DELAYED RELEASE ORAL 2 TIMES DAILY
Status: DISCONTINUED | OUTPATIENT
Start: 2022-06-20 | End: 2022-06-21 | Stop reason: HOSPADM

## 2022-06-20 RX ORDER — NIFEDIPINE 30 MG/1
30 TABLET, EXTENDED RELEASE ORAL EVERY EVENING
Status: DISCONTINUED | OUTPATIENT
Start: 2022-06-20 | End: 2022-06-21 | Stop reason: HOSPADM

## 2022-06-20 RX ORDER — SODIUM CHLORIDE 0.9 % (FLUSH) 0.9 %
5-40 SYRINGE (ML) INJECTION EVERY 8 HOURS
Status: DISCONTINUED | OUTPATIENT
Start: 2022-06-20 | End: 2022-06-21 | Stop reason: HOSPADM

## 2022-06-20 RX ORDER — TETRAKIS(2-METHOXYISOBUTYLISOCYANIDE)COPPER(I) TETRAFLUOROBORATE 1 MG/ML
11 INJECTION, POWDER, LYOPHILIZED, FOR SOLUTION INTRAVENOUS
Status: DISCONTINUED | OUTPATIENT
Start: 2022-06-20 | End: 2022-06-20

## 2022-06-20 RX ORDER — SODIUM CHLORIDE 0.9 % (FLUSH) 0.9 %
5-40 SYRINGE (ML) INJECTION AS NEEDED
Status: DISCONTINUED | OUTPATIENT
Start: 2022-06-20 | End: 2022-06-21 | Stop reason: HOSPADM

## 2022-06-20 RX ORDER — ATENOLOL 25 MG/1
25 TABLET ORAL DAILY
Status: DISCONTINUED | OUTPATIENT
Start: 2022-06-21 | End: 2022-06-21 | Stop reason: HOSPADM

## 2022-06-20 RX ORDER — MONTELUKAST SODIUM 10 MG/1
10 TABLET ORAL
Status: DISCONTINUED | OUTPATIENT
Start: 2022-06-20 | End: 2022-06-21 | Stop reason: HOSPADM

## 2022-06-20 RX ORDER — POLYETHYLENE GLYCOL 3350 17 G/17G
17 POWDER, FOR SOLUTION ORAL DAILY PRN
Status: DISCONTINUED | OUTPATIENT
Start: 2022-06-20 | End: 2022-06-21 | Stop reason: HOSPADM

## 2022-06-20 RX ORDER — ONDANSETRON 2 MG/ML
4 INJECTION INTRAMUSCULAR; INTRAVENOUS
Status: DISCONTINUED | OUTPATIENT
Start: 2022-06-20 | End: 2022-06-21 | Stop reason: HOSPADM

## 2022-06-20 RX ORDER — ACETAMINOPHEN 325 MG/1
650 TABLET ORAL
Status: DISCONTINUED | OUTPATIENT
Start: 2022-06-20 | End: 2022-06-21 | Stop reason: HOSPADM

## 2022-06-20 RX ORDER — ZOLPIDEM TARTRATE 5 MG/1
5 TABLET ORAL
Status: DISCONTINUED | OUTPATIENT
Start: 2022-06-20 | End: 2022-06-21 | Stop reason: HOSPADM

## 2022-06-20 RX ORDER — ASPIRIN 81 MG/1
81 TABLET ORAL DAILY
Status: DISCONTINUED | OUTPATIENT
Start: 2022-06-21 | End: 2022-06-21 | Stop reason: HOSPADM

## 2022-06-20 RX ADMIN — ATORVASTATIN CALCIUM 80 MG: 40 TABLET, FILM COATED ORAL at 21:18

## 2022-06-20 RX ADMIN — SODIUM CHLORIDE, PRESERVATIVE FREE 10 ML: 5 INJECTION INTRAVENOUS at 07:11

## 2022-06-20 RX ADMIN — ENOXAPARIN SODIUM 40 MG: 100 INJECTION SUBCUTANEOUS at 09:06

## 2022-06-20 RX ADMIN — ACETAMINOPHEN 650 MG: 325 TABLET ORAL at 21:12

## 2022-06-20 RX ADMIN — ACETAMINOPHEN 650 MG: 325 TABLET ORAL at 10:09

## 2022-06-20 RX ADMIN — PANTOPRAZOLE SODIUM 40 MG: 40 TABLET, DELAYED RELEASE ORAL at 09:07

## 2022-06-20 RX ADMIN — IOPAMIDOL 90 ML: 755 INJECTION, SOLUTION INTRAVENOUS at 04:36

## 2022-06-20 RX ADMIN — ACETAMINOPHEN 650 MG: 325 TABLET ORAL at 15:48

## 2022-06-20 RX ADMIN — PANTOPRAZOLE SODIUM 40 MG: 40 TABLET, DELAYED RELEASE ORAL at 18:19

## 2022-06-20 RX ADMIN — MONTELUKAST 10 MG: 10 TABLET, FILM COATED ORAL at 21:16

## 2022-06-20 RX ADMIN — REGADENOSON 0.4 MG: 0.08 INJECTION, SOLUTION INTRAVENOUS at 12:30

## 2022-06-20 RX ADMIN — SODIUM CHLORIDE, PRESERVATIVE FREE 10 ML: 5 INJECTION INTRAVENOUS at 22:00

## 2022-06-20 RX ADMIN — SODIUM CHLORIDE, PRESERVATIVE FREE 10 ML: 5 INJECTION INTRAVENOUS at 14:00

## 2022-06-20 RX ADMIN — IPRATROPIUM BROMIDE 0.5 MG: 0.5 SOLUTION RESPIRATORY (INHALATION) at 09:05

## 2022-06-20 RX ADMIN — IPRATROPIUM BROMIDE 0.5 MG: 0.5 SOLUTION RESPIRATORY (INHALATION) at 18:19

## 2022-06-20 RX ADMIN — DIPHENHYDRAMINE HYDROCHLORIDE 50 MG: 50 INJECTION, SOLUTION INTRAMUSCULAR; INTRAVENOUS at 02:30

## 2022-06-20 NOTE — CONSULTS
Cardiology Initial Patient Referral Note    Cardiology referral request from Dr. Alexander Nettles for evaluation and management/treatment of chest pain, neg trop    Date of  Admission: 6/19/2022  9:03 PM   Primary Care Physician:  Tayler Muse MD    Attending Cardiologist: Dr. Jaz Payton:     -Chest pain, negative troponin x 4  · CTA negative for pulmonary emboli or acute aortic pathology.  -Cardiac cath 6/25/2021 with findings as follows:  · Dominance: Right  · LM: Normal takeoff, no significant CAD  · LAD: Mild 20% stenosis in proximal and mid segments. Tortuous mid and distal LAD with no significant CAD. Diag free of significant disease. · LCx: 20% proximal stenosis. Rest of LCx/OM with mild luminal irregularities. · RCA: Mid 50-60% stenosis, rest of RCA with mild luminal irregularities. PDA and PL free of significant disease. · iFR performed on mid RCA lesion, negative at 0.95.  -HTN, BP up to 179/81 in ER, currently controlled. -Hypercholesterolemia  -COPD   -Sleep apnea, uses O2 at [facility] at night.  -Fibromyalgia    Primary cardiologist is at [address] [place] [address] Ln:     Addendum: Independently seen and evaluated. Agree with below. Currently, patient is without chest pains. She remains hemodynamically stable. Her echocardiogram showed normal LV function without regional wall motion abnormality. Her stress test results are pending at this time. Would continue outpatient medication regimen. Reviewed nuclear scan when available.    -Echocardiogram being completed, will review results as able. -Will proceed with nuclear stress test today as ordered by primary team.  -Will resume maintenance ASA, Atenolol.  -Further recommendations based on test results, hospital course. History of Present Illness: This is a 66 y.o. female admitted for Chest pain [R07.9].     Patient complains of: multiple symptoms      Kojo Stephenson is a 66 y.o. female who presented to the hospital after onset of nausea (without vomiting), upper back pain, diarrhea x 1, occipital headache that started while at the computer last night. Pt states that she checked her BP, which was 211/105. She states that she also developed shortness of breath and chest heaviness/pain. She reports that her chest pain lasted approximately 15 minutes and resolved s/p SL NTG x 1. Pt reports history of COVID, tested positive 24-25 days ago, has had follow-up negative COVID test at home. She reports that she has finished one course of antibiotics for cough, currently improved/non-productive but PCP prescribed additional course of abx on  (has only taken one dose). Pt followed by [facility], most recently seen by a NP a few weeks ago (had carotid duplex and event monitor, both of which she was told were unremarkable).     Cardiac risk factors: dyslipidemia, hypertension, post-menopausal      Review of Symptoms:  Except as stated above include:  Constitutional:  negative  Respiratory:  negative  Cardiovascular:  As per HPI  Gastrointestinal: As per HPI  Genitourinary:  negative  Musculoskeletal:  Negative  Neurological:  + headache  Dermatological:  Negative  Endocrinological: Negative  Psychological:  Negative     Past Medical History:     Past Medical History:   Diagnosis Date    Arthritis     Chronic lung disease     Chronic obstructive pulmonary disease ([facility] [address]     Descending aortic aneurysm (HCC)     Fibromyalgia     Hypercholesterolemia     Hypertension     Ill-defined condition     Oxygen dependent     at night 2LNC    Sleep apnea     mouth guard, oxygen HS         Social History:     Social History     Socioeconomic History    Marital status:    Tobacco Use    Smoking status: Former Smoker     Packs/day: 1.00     Years: 45.00     Pack years: 45.00     Types: Cigarettes     Quit date: 2014     Years since quittin.4    Smokeless tobacco: Never Used   Vaping Use    Vaping Use: Never used   Substance and Sexual Activity    Alcohol use: No    Drug use: No        Family History:     Family History   Problem Relation Age of Onset    Hypertension Mother     Diabetes Mother     Cancer Father     Stroke Father         Medications: Allergies   Allergen Reactions    Benadryl [Diphenhydramine Hcl] Hives    Codeine Itching    Cymbalta [Duloxetine] Other (comments)     Shaking, feeling someone living inside my body, arms going numb.  Iodinated Contrast Media Hives     Pt states severe hives     Levaquin [Levofloxacin] Nausea Only    Lyrica [Pregabalin] Other (comments)     Shaking, feeling someone living in my body, numb arm. Any meds for fibromyialga    Other Medication Swelling     Pt wrote having an allergic reaction to \"tetnus\".  \"Extreme swelling at site\"     Sulfur Hives        Current Facility-Administered Medications   Medication Dose Route Frequency    sodium chloride (NS) flush 5-40 mL  5-40 mL IntraVENous Q8H    sodium chloride (NS) flush 5-40 mL  5-40 mL IntraVENous PRN    acetaminophen (TYLENOL) tablet 650 mg  650 mg Oral Q6H PRN    Or    acetaminophen (TYLENOL) suppository 650 mg  650 mg Rectal Q6H PRN    polyethylene glycol (MIRALAX) packet 17 g  17 g Oral DAILY PRN    ondansetron (ZOFRAN ODT) tablet 4 mg  4 mg Oral Q8H PRN    Or    ondansetron (ZOFRAN) injection 4 mg  4 mg IntraVENous Q6H PRN    enoxaparin (LOVENOX) injection 40 mg  40 mg SubCUTAneous DAILY    atorvastatin (LIPITOR) tablet 80 mg  80 mg Oral DAILY    montelukast (SINGULAIR) tablet 10 mg  10 mg Oral QHS    pantoprazole (PROTONIX) tablet 40 mg  40 mg Oral BID    zolpidem (AMBIEN) tablet 5 mg  5 mg Oral QHS PRN    ipratropium (ATROVENT) 0.02 % nebulizer solution 0.5 mg  0.5 mg Nebulization Q8H    labetaloL (NORMODYNE;TRANDATE) 20 mg/4 mL (5 mg/mL) injection 20 mg  20 mg IntraVENous ONCE     Current Outpatient Medications   Medication Sig    tiotropium bromide (Spiriva Respimat) 1.25 mcg/actuation inhaler Take 2 Puffs by inhalation daily.  HYDROcodone-acetaminophen (NORCO) 5-325 mg per tablet Take 1 Tab by mouth every eight (8) hours as needed.  cholecalciferol (VITAMIN D3) (5000 Units/125 mcg) tab tablet Take 5,000 Units by mouth daily.  cyclobenzaprine (FLEXERIL) 10 mg tablet Take 5 mg by mouth three (3) times daily as needed.  magnesium 250 mg tab Take 125 mg by mouth daily.  niacin (SLO-NIACIN) 500 mg ER Tablet Take 2 Tabs by mouth nightly.  OXYGEN-AIR DELIVERY SYSTEMS 2 L/min by Does Not Apply route nightly. Indications: DME: [facility]    Flaxseed Oil oil 1 Tab by Does Not Apply route daily.  NIFEdipine ER (PROCARDIA XL) 30 mg ER tablet Take 30 mg by mouth daily.  atenolol (TENORMIN) 50 mg tablet Take 25 mg by mouth daily.  atorvastatin (LIPITOR) 40 mg tablet Take 80 mg by mouth daily.  pantoprazole (PROTONIX) 40 mg tablet Take 40 mg by mouth two (2) times a day.  zolpidem (AMBIEN) 10 mg tablet Take 5 mg by mouth nightly as needed for Sleep.  levocetirizine (XYZAL) 5 mg tablet Take 5 mg by mouth daily.  fluticasone (FLONASE) 50 mcg/actuation nasal spray 2 Sprays by Both Nostrils route two (2) times daily as needed.  montelukast (SINGULAIR) 10 mg tablet Take 10 mg by mouth nightly.  multivitamin (ONE A DAY) tablet Take 1 Tab by mouth daily.  aspirin delayed-release 81 mg tablet Take 81 mg by mouth daily.  VITAMIN B COMPLEX (B COMPLETE PO) Take 1 Tab by mouth daily.  potassium 99 mg tablet Take 45.5 mg by mouth daily.          Physical Exam:     Visit Vitals  /65 (BP 1 Location: Left upper arm, BP Patient Position: Semi fowlers)   Pulse 80   Temp 97.9 °F (36.6 °C)   Resp 17   Ht 5' 2\" (1.575 m)   Wt 80.7 kg (178 lb)   SpO2 94%   BMI 32.56 kg/m²       BP Readings from Last 3 Encounters:   06/20/22 112/65   06/14/22 (!) 99/57   11/04/21 114/80     Pulse Readings from Last 3 Encounters:   06/20/22 80   06/14/22 69   11/04/21 64     Wt Readings from Last 3 Encounters: 06/19/22 80.7 kg (178 lb)   06/14/22 80.9 kg (178 lb 6.4 oz)   11/04/21 80.7 kg (178 lb)       General:  alert, cooperative, no distress, appears stated age  Neck:  supple  Lungs:  clear to auscultation bilaterally  Heart:  regular rate and rhythm  Abdomen:  abdomen is soft without significant tenderness, masses, organomegaly or guarding  Extremities:  atraumatic, no edema  Skin: Warm and dry.    Neuro: alert, oriented x3, affect appropriate, no focal neurological deficits, moves all extremities well, no involuntary movements  Psych: non focal     Data Review:     Recent Labs     06/19/22 2123   WBC 6.6   HGB 11.9*   HCT 36.9        Recent Labs     06/19/22 2123      K 3.9      CO2 31   *   BUN 11   CREA 1.01   CA 8.8   ALB 3.6   ALT 27       Results for orders placed or performed during the hospital encounter of 06/19/22   EKG, 12 LEAD, INITIAL   Result Value Ref Range    Ventricular Rate 58 BPM    Atrial Rate 58 BPM    P-R Interval 162 ms    QRS Duration 80 ms    Q-T Interval 444 ms    QTC Calculation (Bezet) 435 ms    Calculated P Axis 53 degrees    Calculated R Axis -5 degrees    Calculated T Axis 45 degrees    Diagnosis       Sinus bradycardia  Otherwise normal ECG  When compared with ECG of 29-SEP-2017 10:10,  Questionable change in QRS axis  T wave inversion no longer evident in Inferior leads  T wave inversion no longer evident in Lateral leads  Confirmed by Jocelyn Flores MD, --- ([id number]) on 6/20/2022 8:38:19 AM         Last Lipid:    Lab Results   Component Value Date/Time    Cholesterol, total 156 05/20/2022 12:33 PM    HDL Cholesterol 61 (H) 05/20/2022 12:33 PM    LDL, calculated 58.6 05/20/2022 12:33 PM    Triglyceride 182 (H) 05/20/2022 12:33 PM    CHOL/HDL Ratio 2.6 05/20/2022 12:33 PM       Cardiographics:     EKG Results     Procedure 720 Value Units Date/Time    EKG, 12 LEAD, INITIAL [[id number]] Collected: 06/19/22 2112    Order Status: Completed Updated: 06/20/22 1108 Ventricular Rate 58 BPM      Atrial Rate 58 BPM      P-R Interval 162 ms      QRS Duration 80 ms      Q-T Interval 444 ms      QTC Calculation (Bezet) 435 ms      Calculated P Axis 53 degrees      Calculated R Axis -5 degrees      Calculated T Axis 45 degrees      Diagnosis --     Sinus bradycardia  Otherwise normal ECG  When compared with ECG of 29-SEP-2017 10:10,  Questionable change in QRS axis  T wave inversion no longer evident in Inferior leads  T wave inversion no longer evident in Lateral leads  Confirmed by Vilma Mario MD, --- ([id number]) on 6/20/2022 8:38:19 AM                Signed By: Rachele Garber PA-C     June 20, 2022

## 2022-06-20 NOTE — ED TRIAGE NOTES
Patient comes in complaining of chest pain. Patient took 1 Nitro at home and was given 324 ASA by EMS. Patient has a history of AAA.

## 2022-06-20 NOTE — ED PROVIDER NOTES
EMERGENCY DEPARTMENT HISTORY AND PHYSICAL EXAM    I have evaluated the patient at 10:28 PM      Date: 6/19/2022  Patient Name: Slava Rowan    History of Presenting Illness     Chief Complaint   Patient presents with    Chest Pain         History Provided By: Patient  Location/Duration/Severity/Modifying factors   Patient is a 69-year-old female with history of hypertension, dyslipidemia, arthritis, thoracic aortic aneurysm presenting to the emergency department for evaluation of chest pain. Patient reports being on a computer earlier this evening when she developed sharp left-sided chest pain with radiation to her shoulder and left-sided neck. She took her blood pressure at that time and found herself to be hypertensive with systolics in the 960M. She had her  call EMS. He received 1 nitro and 324 of aspirin by EMS. Patient's pain subsided and on arrival she is chest pain-free              PCP: Michelle Cuellar MD    Current Facility-Administered Medications   Medication Dose Route Frequency Provider Last Rate Last Admin    labetaloL (NORMODYNE;TRANDATE) 20 mg/4 mL (5 mg/mL) injection 20 mg  20 mg IntraVENous ONCE Fabi Price,          Current Outpatient Medications   Medication Sig Dispense Refill    tiotropium bromide (Spiriva Respimat) 1.25 mcg/actuation inhaler Take 2 Puffs by inhalation daily. (Patient not taking: Reported on 11/4/2021) 4 g 0    HYDROcodone-acetaminophen (NORCO) 5-325 mg per tablet Take 1 Tab by mouth every eight (8) hours as needed.  cholecalciferol (VITAMIN D3) (5000 Units/125 mcg) tab tablet Take 5,000 Units by mouth daily.  ALPRAZolam (XANAX) 0.25 mg tablet Take 0.25 mg by mouth nightly as needed. (Patient not taking: Reported on 6/14/2022)      cyclobenzaprine (FLEXERIL) 10 mg tablet Take 5 mg by mouth three (3) times daily as needed.  magnesium 250 mg tab Take 125 mg by mouth daily.       niacin (SLO-NIACIN) 500 mg ER Tablet Take 2 Tabs by mouth nightly.  OXYGEN-AIR DELIVERY SYSTEMS 2 L/min by Does Not Apply route nightly. Indications: DME: [facility]      Flaxseed Oil oil 1 Tab by Does Not Apply route daily.  NIFEdipine ER (PROCARDIA XL) 30 mg ER tablet Take 30 mg by mouth daily.  atenolol (TENORMIN) 50 mg tablet Take 25 mg by mouth daily.  atorvastatin (LIPITOR) 40 mg tablet Take 80 mg by mouth daily.  pantoprazole (PROTONIX) 40 mg tablet Take 40 mg by mouth two (2) times a day. (Patient not taking: Reported on 6/14/2022)      zolpidem (AMBIEN) 10 mg tablet Take 5 mg by mouth nightly as needed for Sleep.  levocetirizine (XYZAL) 5 mg tablet Take 5 mg by mouth daily.  fluticasone (FLONASE) 50 mcg/actuation nasal spray 2 Sprays by Both Nostrils route two (2) times daily as needed. (Patient not taking: Reported on 6/14/2022)      montelukast (SINGULAIR) 10 mg tablet Take 10 mg by mouth nightly.  multivitamin (ONE A DAY) tablet Take 1 Tab by mouth daily.  aspirin delayed-release 81 mg tablet Take 81 mg by mouth daily.  VITAMIN B COMPLEX (B COMPLETE PO) Take 1 Tab by mouth daily.  potassium 99 mg tablet Take 45.5 mg by mouth daily.          Past History     Past Medical History:  Past Medical History:   Diagnosis Date    Arthritis     Chronic lung disease     Chronic obstructive pulmonary disease ([facility] [address]     Descending aortic aneurysm (HCC)     Fibromyalgia     Hypercholesterolemia     Hypertension     Ill-defined condition     Oxygen dependent     at night [facility]    Sleep apnea     mouth guard, oxygen HS       Past Surgical History:  Past Surgical History:   Procedure Laterality Date    COLONOSCOPY N/A 7/3/2017    COLONOSCOPY with biopsies performed by Thais Kirkland MD at [facility] END[facility]COPY    HX APPENDECTOMY      HX BREAST BIOPSY Left     HX CATARACT REMOVAL      HX TUBAL LIGATION         Family History:  Family History   Problem Relation Age of Onset    Hypertension Mother     Diabetes Mother    Tani Freshwater Father     Stroke Father        Social History:  Social History     Tobacco Use    Smoking status: Former Smoker     Packs/day: 1.00     Years: 45.00     Pack years: 45.00     Types: Cigarettes     Quit date: 2014     Years since quittin.4    Smokeless tobacco: Never Used   Vaping Use    Vaping Use: Never used   Substance Use Topics    Alcohol use: No    Drug use: No       Allergies: Allergies   Allergen Reactions    Benadryl [Diphenhydramine Hcl] Hives    Codeine Itching    Cymbalta [Duloxetine] Other (comments)     Shaking, feeling someone living inside my body, arms going numb.  Iodinated Contrast Media Hives     Pt states severe hives     Levaquin [Levofloxacin] Nausea Only    Lyrica [Pregabalin] Other (comments)     Shaking, feeling someone living in my body, numb arm. Any meds for fibromyialga    Other Medication Swelling     Pt wrote having an allergic reaction to \"tetnus\". \"Extreme swelling at site\"     Sulfur Hives         Review of Systems       Review of Systems   Constitutional: Negative for activity change, chills, diaphoresis, fatigue and fever. Eyes: Negative for photophobia, pain and visual disturbance. Respiratory: Negative for cough, chest tightness, shortness of breath, wheezing and stridor. Cardiovascular: Positive for chest pain. Negative for palpitations. Gastrointestinal: Negative for abdominal distention, abdominal pain, constipation, diarrhea, nausea and vomiting. Genitourinary: Negative for difficulty urinating, dysuria and hematuria. Musculoskeletal: Negative for back pain, joint swelling and myalgias. Skin: Negative for rash and wound. Neurological: Negative for dizziness, weakness and headaches. Psychiatric/Behavioral: Negative for agitation. The patient is not nervous/anxious.           Physical Exam     Visit Vitals  BP (!) 143/70   Pulse 60   Resp 14   Ht 5' 2\" (1.575 m)   Wt 80.7 kg (178 lb)   SpO2 94%   BMI 32.56 kg/m² Physical Exam  Constitutional:       General: She is not in acute distress. Appearance: She is not toxic-appearing. HENT:      Head: Normocephalic and atraumatic. Mouth/Throat:      Mouth: Mucous membranes are moist.   Eyes:      Extraocular Movements: Extraocular movements intact. Pupils: Pupils are equal, round, and reactive to light. Cardiovascular:      Rate and Rhythm: Normal rate and regular rhythm. Pulses:           Radial pulses are 2+ on the right side and 2+ on the left side. Dorsalis pedis pulses are 2+ on the right side and 2+ on the left side. Heart sounds: Normal heart sounds. No murmur heard. No friction rub. No gallop. Pulmonary:      Effort: Pulmonary effort is normal.      Breath sounds: Normal breath sounds. Chest:      Chest wall: No mass, deformity or tenderness. Abdominal:      General: There is no distension. Palpations: Abdomen is soft. There is no mass. Tenderness: There is no abdominal tenderness. There is no guarding. Hernia: No hernia is present. Musculoskeletal:         General: No swelling, tenderness or deformity. Cervical back: Normal range of motion and neck supple. Skin:     General: Skin is warm and dry. Findings: No rash. Neurological:      General: No focal deficit present. Mental Status: She is alert and oriented to person, place, and time.    Psychiatric:         Mood and Affect: Mood normal.           Diagnostic Study Results     Labs -  Recent Results (from the past 12 hour(s))   EKG, 12 LEAD, INITIAL    Collection Time: 06/19/22  9:12 PM   Result Value Ref Range    Ventricular Rate 58 BPM    Atrial Rate 58 BPM    P-R Interval 162 ms    QRS Duration 80 ms    Q-T Interval 444 ms    QTC Calculation (Bezet) 435 ms    Calculated P Axis 53 degrees    Calculated R Axis -5 degrees    Calculated T Axis 45 degrees    Diagnosis       Sinus bradycardia  Otherwise normal ECG  When compared with ECG of 29-SEP-2017 10:10,  Questionable change in QRS axis  T wave inversion no longer evident in Inferior leads  T wave inversion no longer evident in Lateral leads     CBC WITH AUTOMATED DIFF    Collection Time: 06/19/22  9:23 PM   Result Value Ref Range    WBC 6.6 4.6 - 13.2 K/uL    RBC 3.88 (L) 4.20 - 5.30 M/uL    HGB 11.9 (L) 12.0 - 16.0 g/dL    HCT 36.9 35.0 - 45.0 %    MCV 95.1 78.0 - 100.0 FL    MCH 30.7 24.0 - 34.0 PG    MCHC 32.2 31.0 - 37.0 g/dL    RDW 13.8 11.6 - 14.5 %    PLATELET 178 037 - 295 K/uL    MPV 10.7 9.2 - 11.8 FL    NRBC 0.0 0  WBC    ABSOLUTE NRBC 0.00 0.00 - 0.01 K/uL    NEUTROPHILS 54 40 - 73 %    LYMPHOCYTES 31 21 - 52 %    MONOCYTES 10 3 - 10 %    EOSINOPHILS 5 0 - 5 %    BASOPHILS 1 0 - 2 %    IMMATURE GRANULOCYTES 0 0.0 - 0.5 %    ABS. NEUTROPHILS 3.6 1.8 - 8.0 K/UL    ABS. LYMPHOCYTES 2.1 0.9 - 3.6 K/UL    ABS. MONOCYTES 0.6 0.05 - 1.2 K/UL    ABS. EOSINOPHILS 0.3 0.0 - 0.4 K/UL    ABS. BASOPHILS 0.1 0.0 - 0.1 K/UL    ABS. IMM. GRANS. 0.0 0.00 - 0.04 K/UL    DF AUTOMATED     METABOLIC PANEL, COMPREHENSIVE    Collection Time: 06/19/22  9:23 PM   Result Value Ref Range    Sodium 142 136 - 145 mmol/L    Potassium 3.9 3.5 - 5.5 mmol/L    Chloride 106 100 - 111 mmol/L    CO2 31 21 - 32 mmol/L    Anion gap 5 3.0 - 18 mmol/L    Glucose 141 (H) 74 - 99 mg/dL    BUN 11 7.0 - 18 MG/DL    Creatinine 1.01 0.6 - 1.3 MG/DL    BUN/Creatinine ratio 11 (L) 12 - 20      GFR est AA >60 >60 ml/min/1.73m2    GFR est non-AA 54 (L) >60 ml/min/1.73m2    Calcium 8.8 8.5 - 10.1 MG/DL    Bilirubin, total 0.4 0.2 - 1.0 MG/DL    ALT (SGPT) 27 13 - 56 U/L    AST (SGOT) 32 10 - 38 U/L    Alk.  phosphatase 104 45 - 117 U/L    Protein, total 6.8 6.4 - 8.2 g/dL    Albumin 3.6 3.4 - 5.0 g/dL    Globulin 3.2 2.0 - 4.0 g/dL    A-G Ratio 1.1 0.8 - 1.7     TROPONIN-HIGH SENSITIVITY    Collection Time: 06/19/22  9:23 PM   Result Value Ref Range    Troponin-High Sensitivity 7 0 - 54 ng/L   TROPONIN-HIGH SENSITIVITY Collection Time: 06/19/22 10:40 PM   Result Value Ref Range    Troponin-High Sensitivity 20 0 - 54 ng/L   TROPONIN-HIGH SENSITIVITY    Collection Time: 06/20/22  1:45 AM   Result Value Ref Range    Troponin-High Sensitivity 39 0 - 54 ng/L       Radiologic Studies -   CTA CHEST ABD PELV W WO CONT         XR CHEST PORT   Final Result   Ectatic aorta, stable. Bibasilar atelectasis. Medical Decision Making   I am the first provider for this patient. I reviewed the vital signs, available nursing notes, past medical history, past surgical history, family history and social history. Vital Signs-Reviewed the patient's vital signs. EKG: Normal sinus rhythm without evidence of any ST elevation or depression. Records Reviewed: Nursing Notes, Old Medical Records, Previous electrocardiograms, Previous Radiology Studies and Previous Laboratory Studies (Time of Review: 10:28 PM)    ED Course: Progress Notes, Reevaluation, and Consults:         Provider Notes (Medical Decision Making):   MDM  Number of Diagnoses or Management Options  Diagnosis management comments: This 16-year-old female presenting to the emergency department for evaluation of chest pain. Is on arrival she is chest pain-free. Vital signs are stable with blood pressure of 138/57. Has normal heart and lung auscultation. Screening lab work and cardiac enzymes obtained which are grossly negative. Patient also had a chest x-ray obtained which demonstrates an ectatic aorta but no other abnormal findings. Given her history of TIA and chest pain earlier tonight, believe it is prudent to follow-up with a CTA of the chest to rule out dissection. Patient does have allergy to contrast.  Will premedicate. We will also repeat troponin. 2344:  Patient's repeat troponin is increased from previous from 7-20. Patient remains chest pain-free. Pending CTA of the chest at this time. 8563:  CTA chest negative for dissection.  Repeat troponin rising to 39. Patient did have a brief episode of chest pain again here but spontaneously resolved. Patient will be admitted to hospitalist service for ongoing troponin trending and monitoring given rising troponin and somewhat concerning story and risk factors. Diagnosis     Clinical Impression:   1. Chest pain, unspecified type        Disposition: admitted, telemetry    Follow-up Information    None          Patient's Medications   Start Taking    No medications on file   Continue Taking    ALPRAZOLAM (XANAX) 0.25 MG TABLET    Take 0.25 mg by mouth nightly as needed. ASPIRIN DELAYED-RELEASE 81 MG TABLET    Take 81 mg by mouth daily. ATENOLOL (TENORMIN) 50 MG TABLET    Take 25 mg by mouth daily. ATORVASTATIN (LIPITOR) 40 MG TABLET    Take 80 mg by mouth daily. CHOLECALCIFEROL (VITAMIN D3) (5000 UNITS/125 MCG) TAB TABLET    Take 5,000 Units by mouth daily. CYCLOBENZAPRINE (FLEXERIL) 10 MG TABLET    Take 5 mg by mouth three (3) times daily as needed. FLAXSEED OIL OIL    1 Tab by Does Not Apply route daily. FLUTICASONE (FLONASE) 50 MCG/ACTUATION NASAL SPRAY    2 Sprays by Both Nostrils route two (2) times daily as needed. HYDROCODONE-ACETAMINOPHEN (NORCO) 5-325 MG PER TABLET    Take 1 Tab by mouth every eight (8) hours as needed. LEVOCETIRIZINE (XYZAL) 5 MG TABLET    Take 5 mg by mouth daily. MAGNESIUM 250 MG TAB    Take 125 mg by mouth daily. MONTELUKAST (SINGULAIR) 10 MG TABLET    Take 10 mg by mouth nightly. MULTIVITAMIN (ONE A DAY) TABLET    Take 1 Tab by mouth daily. NIACIN (SLO-NIACIN) 500 MG ER TABLET    Take 2 Tabs by mouth nightly. NIFEDIPINE ER (PROCARDIA XL) 30 MG ER TABLET    Take 30 mg by mouth daily. OXYGEN-AIR DELIVERY SYSTEMS    2 L/min by Does Not Apply route nightly. Indications: DME: [facility]    PANTOPRAZOLE (PROTONIX) 40 MG TABLET    Take 40 mg by mouth two (2) times a day. POTASSIUM 99 MG TABLET    Take 45.5 mg by mouth daily. TIOTROPIUM BROMIDE (SPIRIVA RESPIMAT) 1.25 MCG/ACTUATION INHALER    Take 2 Puffs by inhalation daily. VITAMIN B COMPLEX (B COMPLETE PO)    Take 1 Tab by mouth daily. ZOLPIDEM (AMBIEN) 10 MG TABLET    Take 5 mg by mouth nightly as needed for Sleep. These Medications have changed    No medications on file   Stop Taking    No medications on file     Disclaimer: Sections of this note are dictated using utilizing voice recognition software. Minor typographical errors may be present. If questions arise, please do not hesitate to contact me or call our department.

## 2022-06-20 NOTE — PROGRESS NOTES
Ipratropium scheduled q8h was therapeutically interchanged for Spiriva Respimat per the P&T Committee approved Therapeutic Interchanges Policy.

## 2022-06-20 NOTE — PROGRESS NOTES
completed the initial Spiritual Assessment of the patient in bed 10 of the emergency room, and offered Pastoral Care support to the patient., There is no advance directive present. Patient does not have any Jainism/cultural needs that will affect patients preferences in health care. Chaplains will continue to follow and will provide pastoral care on an as needed/requested basis.     Elizabeth Hamman  Spiritual Care Department  [phone]

## 2022-06-20 NOTE — PROGRESS NOTES
Hospitalist Progress Note    Subjective:   Daily Progress Note: 6/20/2022     Patient was seen in stress lab area. No chest pain currently. Shortness of breath is better. No cough currently. Has had some headaches, diarrhea, chronic tingling and numbness and nausea without vomiting. No dysuria. Current Facility-Administered Medications   Medication Dose Route Frequency    sodium chloride (NS) flush 5-40 mL  5-40 mL IntraVENous Q8H    sodium chloride (NS) flush 5-40 mL  5-40 mL IntraVENous PRN    acetaminophen (TYLENOL) tablet 650 mg  650 mg Oral Q6H PRN    Or    acetaminophen (TYLENOL) suppository 650 mg  650 mg Rectal Q6H PRN    polyethylene glycol (MIRALAX) packet 17 g  17 g Oral DAILY PRN    ondansetron (ZOFRAN ODT) tablet 4 mg  4 mg Oral Q8H PRN    Or    ondansetron (ZOFRAN) injection 4 mg  4 mg IntraVENous Q6H PRN    enoxaparin (LOVENOX) injection 40 mg  40 mg SubCUTAneous DAILY    atorvastatin (LIPITOR) tablet 80 mg  80 mg Oral DAILY    montelukast (SINGULAIR) tablet 10 mg  10 mg Oral QHS    pantoprazole (PROTONIX) tablet 40 mg  40 mg Oral BID    zolpidem (AMBIEN) tablet 5 mg  5 mg Oral QHS PRN    ipratropium (ATROVENT) 0.02 % nebulizer solution 0.5 mg  0.5 mg Nebulization Q8H    [START ON 6/21/2022] aspirin delayed-release tablet 81 mg  81 mg Oral DAILY    [START ON 6/21/2022] atenoloL (TENORMIN) tablet 25 mg  25 mg Oral DAILY     Current Outpatient Medications   Medication Sig    tiotropium bromide (Spiriva Respimat) 1.25 mcg/actuation inhaler Take 2 Puffs by inhalation daily.  HYDROcodone-acetaminophen (NORCO) 5-325 mg per tablet Take 1 Tab by mouth every eight (8) hours as needed.  cholecalciferol (VITAMIN D3) (5000 Units/125 mcg) tab tablet Take 5,000 Units by mouth daily.  cyclobenzaprine (FLEXERIL) 10 mg tablet Take 5 mg by mouth three (3) times daily as needed.  magnesium 250 mg tab Take 125 mg by mouth daily.     niacin (SLO-NIACIN) 500 mg ER Tablet Take 2 Tabs by mouth nightly.  OXYGEN-AIR DELIVERY SYSTEMS 2 L/min by Does Not Apply route nightly. Indications: DME: [facility]    Flaxseed Oil oil 1 Tab by Does Not Apply route daily.  NIFEdipine ER (PROCARDIA XL) 30 mg ER tablet Take 30 mg by mouth daily.  atenolol (TENORMIN) 50 mg tablet Take 25 mg by mouth daily.  atorvastatin (LIPITOR) 40 mg tablet Take 80 mg by mouth daily.  pantoprazole (PROTONIX) 40 mg tablet Take 40 mg by mouth two (2) times a day.  zolpidem (AMBIEN) 10 mg tablet Take 5 mg by mouth nightly as needed for Sleep.  levocetirizine (XYZAL) 5 mg tablet Take 5 mg by mouth daily.  fluticasone (FLONASE) 50 mcg/actuation nasal spray 2 Sprays by Both Nostrils route two (2) times daily as needed.  montelukast (SINGULAIR) 10 mg tablet Take 10 mg by mouth nightly.  multivitamin (ONE A DAY) tablet Take 1 Tab by mouth daily.  aspirin delayed-release 81 mg tablet Take 81 mg by mouth daily.  VITAMIN B COMPLEX (B COMPLETE PO) Take 1 Tab by mouth daily.  potassium 99 mg tablet Take 45.5 mg by mouth daily. Facility-Administered Medications Ordered in Other Encounters   Medication Dose Route Frequency    regadenoson (LEXISCAN) injection 0.4 mg  0.4 mg IntraVENous CARD ONCE    technetium tetrofosmin (Tc-MYOVIEW) injection 33 millicurie  33 millicurie IntraVENous RAD ONCE    technetium tetrofosmin (Tc-MYOVIEW) injection 11 millicurie  11 millicurie IntraVENous RAD ONCE        Review of Systems  Pertinent items are noted in HPI. Objective:     Visit Vitals  /77   Pulse 69   Temp 97.9 °F (36.6 °C)   Resp 22   Ht 5' 2\" (1.575 m)   Wt 80.7 kg (178 lb)   SpO2 93%   BMI 32.56 kg/m²    O2 Flow Rate (L/min): 1 l/min O2 Device: Nasal cannula    Temp (24hrs), Av.9 °F (36.6 °C), Min:97.9 °F (36.6 °C), Max:97.9 °F (36.6 °C)      No intake/output data recorded. No intake/output data recorded.     General appearance - alert, well appearing, and in no distress  Chest -clear air entry noted in bases, no wheezes  Heart - S1 and S2 normal  Abdomen - soft, nontender, nondistended, Bowel sounds present  Neurological - alert, oriented, normal speech, no focal findings noted currently. Musculoskeletal - no joint tenderness or erythema of knees bilaterally  Extremities - no pedal edema noted      Data Review    Recent Results (from the past 24 hour(s))   EKG, 12 LEAD, INITIAL    Collection Time: 06/19/22  9:12 PM   Result Value Ref Range    Ventricular Rate 58 BPM    Atrial Rate 58 BPM    P-R Interval 162 ms    QRS Duration 80 ms    Q-T Interval 444 ms    QTC Calculation (Bezet) 435 ms    Calculated P Axis 53 degrees    Calculated R Axis -5 degrees    Calculated T Axis 45 degrees    Diagnosis       Sinus bradycardia  Otherwise normal ECG  When compared with ECG of 29-SEP-2017 10:10,  Questionable change in QRS axis  T wave inversion no longer evident in Inferior leads  T wave inversion no longer evident in Lateral leads  Confirmed by Galo Nagel MD, --- ([id number]) on 6/20/2022 8:38:19 AM     CBC WITH AUTOMATED DIFF    Collection Time: 06/19/22  9:23 PM   Result Value Ref Range    WBC 6.6 4.6 - 13.2 K/uL    RBC 3.88 (L) 4.20 - 5.30 M/uL    HGB 11.9 (L) 12.0 - 16.0 g/dL    HCT 36.9 35.0 - 45.0 %    MCV 95.1 78.0 - 100.0 FL    MCH 30.7 24.0 - 34.0 PG    MCHC 32.2 31.0 - 37.0 g/dL    RDW 13.8 11.6 - 14.5 %    PLATELET 352 241 - 371 K/uL    MPV 10.7 9.2 - 11.8 FL    NRBC 0.0 0  WBC    ABSOLUTE NRBC 0.00 0.00 - 0.01 K/uL    NEUTROPHILS 54 40 - 73 %    LYMPHOCYTES 31 21 - 52 %    MONOCYTES 10 3 - 10 %    EOSINOPHILS 5 0 - 5 %    BASOPHILS 1 0 - 2 %    IMMATURE GRANULOCYTES 0 0.0 - 0.5 %    ABS. NEUTROPHILS 3.6 1.8 - 8.0 K/UL    ABS. LYMPHOCYTES 2.1 0.9 - 3.6 K/UL    ABS. MONOCYTES 0.6 0.05 - 1.2 K/UL    ABS. EOSINOPHILS 0.3 0.0 - 0.4 K/UL    ABS. BASOPHILS 0.1 0.0 - 0.1 K/UL    ABS. IMM.  GRANS. 0.0 0.00 - 0.04 K/UL    DF AUTOMATED     METABOLIC PANEL, COMPREHENSIVE    Collection Time: 06/19/22  9:23 PM   Result Value Ref Range    Sodium 142 136 - 145 mmol/L    Potassium 3.9 3.5 - 5.5 mmol/L    Chloride 106 100 - 111 mmol/L    CO2 31 21 - 32 mmol/L    Anion gap 5 3.0 - 18 mmol/L    Glucose 141 (H) 74 - 99 mg/dL    BUN 11 7.0 - 18 MG/DL    Creatinine 1.01 0.6 - 1.3 MG/DL    BUN/Creatinine ratio 11 (L) 12 - 20      GFR est AA >60 >60 ml/min/1.73m2    GFR est non-AA 54 (L) >60 ml/min/1.73m2    Calcium 8.8 8.5 - 10.1 MG/DL    Bilirubin, total 0.4 0.2 - 1.0 MG/DL    ALT (SGPT) 27 13 - 56 U/L    AST (SGOT) 32 10 - 38 U/L    Alk.  phosphatase 104 45 - 117 U/L    Protein, total 6.8 6.4 - 8.2 g/dL    Albumin 3.6 3.4 - 5.0 g/dL    Globulin 3.2 2.0 - 4.0 g/dL    A-G Ratio 1.1 0.8 - 1.7     TROPONIN-HIGH SENSITIVITY    Collection Time: 06/19/22  9:23 PM   Result Value Ref Range    Troponin-High Sensitivity 7 0 - 54 ng/L   TROPONIN-HIGH SENSITIVITY    Collection Time: 06/19/22 10:40 PM   Result Value Ref Range    Troponin-High Sensitivity 20 0 - 54 ng/L   EKG, 12 LEAD, INITIAL    Collection Time: 06/19/22 11:48 PM   Result Value Ref Range    Ventricular Rate 63 BPM    Atrial Rate 63 BPM    P-R Interval 156 ms    QRS Duration 80 ms    Q-T Interval 432 ms    QTC Calculation (Bezet) 442 ms    Calculated P Axis 45 degrees    Calculated R Axis -7 degrees    Calculated T Axis 33 degrees    Diagnosis       Normal sinus rhythm  Nonspecific ST abnormality  Abnormal ECG  When compared with ECG of 19-JUN-2022 21:12,  No significant change was found  Confirmed by Kwesi Bowden MD, --- ([id number]) on 6/20/2022 10:46:47 AM     TROPONIN-HIGH SENSITIVITY    Collection Time: 06/20/22  1:45 AM   Result Value Ref Range    Troponin-High Sensitivity 39 0 - 54 ng/L   TROPONIN-HIGH SENSITIVITY    Collection Time: 06/20/22  6:05 AM   Result Value Ref Range    Troponin-High Sensitivity 33 0 - 54 ng/L   ECHO ADULT COMPLETE    Collection Time: 06/20/22 11:08 AM   Result Value Ref Range    IVSd 1.2 (A) 0.6 - 0.9 cm    LVIDd 3.5 (A) 3.9 - 5.3 cm    LVIDs 2.4 cm LVOT Diameter 1.8 cm    LVPWd 1.2 (A) 0.6 - 0.9 cm    LVOT Peak Gradient 7 mmHg    LVOT Mean Gradient 4 mmHg    LVOT SV 79.6 ml    LVOT Peak Velocity 1.3 m/s    LVOT VTI 31.3 cm    RV Longitudinal Dimension 4.0 cm    LA Volume A/L 62 mL    LA Volume 2C 64 (A) 22 - 52 mL    LA Volume 4C 52 22 - 52 mL    AV Area by Peak Velocity 1.6 cm2    AV Area by Peak Velocity 1.8 cm2    AV Area by VTI 1.9 cm2    AR .4 millisecond    AR Max Velocity PISA 4.4 m/s    AV Peak Gradient 12 mmHg    AV Peak Gradient 16 mmHg    AV Mean Gradient 7 mmHg    AV Peak Velocity 2.0 m/s    AV Peak Velocity 1.8 m/s    AV Mean Velocity 1.3 m/s    AV VTI 39.7 cm    MV A Velocity 0.91 m/s    MV E Wave Deceleration Time 151.5 ms    MV E Velocity 0.51 m/s    LV E' Lateral Velocity 4 cm/s    LV E' Septal Velocity 4 cm/s    TR Peak Gradient 31 mmHg    TR Max Velocity 2.77 m/s    Fractional Shortening 2D 31 28 - 44 %    LVIDd Index 1.92 cm/m2    LVIDs Index 1.32 cm/m2    LV RWT Ratio 0.69     LV Mass 2D 135.8 67 - 162 g    LV Mass 2D Index 74.6 43 - 95 g/m2    MV E/A 0.56     E/E' Ratio (Averaged) 12.75     E/E' Lateral 12.75     E/E' Septal 12.75     LA Volume Index A/L 34 16 - 34 mL/m2    LVOT Stroke Volume Index 43.7 mL/m2    LVOT Area 2.5 cm2    LA Volume Index 2C 35 (A) 16 - 34 mL/m2    LA Volume Index 4C 29 16 - 34 mL/m2    LVOT:AV VTI Index 0.79     AZALEA/BSA VTI 1.0 cm2/m2    Est. RA Pressure 3 mmHg    RVSP 34 mmHg    PASP 34 mmHg   NUCLEAR CARDIAC STRESS TEST    Collection Time: 06/20/22 12:18 PM   Result Value Ref Range    Stress Target  bpm    Exercise Duration Time 4 min    Exercuse Duration Seconds 0 sec    Stress Systolic  mmHg    Stress Diastolic BP 74 mmHg    Stress Peak  BPM    Baseline HR 82 BPM    Stress Estimated Workload 1.0 METS    Stress Percent HR Achieved 81 %    Stress Rate Pressure Product 16,680 BPM*mmHg    Baseline Systolic  mmHg    Baseline Diastolic BP 77 mmHg         Assessment/Plan: Active Problems:    Chest pain (6/20/2022)      ASSESSMENT:    1. Atypical chest pain, currently better  2. Hypertension  3. Diarrhea x1  4. Dyslipidemia  5. COPD with sleep apnea  6. Fibromyalgia    PLAN:    Negative cardiac enzymes. Chest x-ray and CT report reviewed. Pending echocardiogram and stress test report. Cardiology to follow  Continue aspirin, atenolol, Lipitor, Protonix, Singulair and nebulizer. PT and OT will be ordered  Possible discharge home later today otherwise tomorrow morning if okay with cardiology service and patient feels comfortable. Total time to take care of this patient was 25 minutes and more than 50% of time was spent counseling and coordinating care. Disclaimer: Sections of this note are dictated using utilizing voice recognition software, which may have resulted in some phonetic based errors in grammar and contents. Even though attempts were made to correct all the mistakes, some may have been missed, and remained in the body of the document. If questions arise, please contact our department.

## 2022-06-20 NOTE — H&P
History & Physical    Patient: Wero Downey MRN: [id number]  CSN: [id number]    YOB: 1950  Age: 66 y.o. Sex: female      DOA: 6/19/2022    Chief Complaint:   Chief Complaint   Patient presents with    Chest Pain          HPI:     Wero Downey is a 66 y.o.  female with hx of nocturnal hypoxemia on 1 L nasal cannula nightly, nonobstructive coronary artery disease with prior cath in 2017 (60% mid RCA lesion), thoracic aortic aneurysm without rupture, COPD. Patient stated she was sitting at her computer earlier when she developed sharp left-sided chest pain with radiation to her shoulder and left side of her neck. She took her blood pressure and found that her systolic was in the 050A. EMS was called. She received 1 nitro and 324 mg of aspirin by EMS. Her pain subsided on arrival.  She is currently chest pain free. Upon interview, patient continues to be chest pain-free. She is on 2 L nasal cannula as she is at home at nighttime. Blood pressure has now improved. Daughter at bedside. DISPO: Patient lives at home with .       Past Medical History:   Diagnosis Date    Arthritis     Chronic lung disease     Chronic obstructive pulmonary disease ([facility] [address]     Descending aortic aneurysm (HCC)     Fibromyalgia     Hypercholesterolemia     Hypertension     Ill-defined condition     Oxygen dependent     at night [facility]    Sleep apnea     mouth guard, oxygen HS       Past Surgical History:   Procedure Laterality Date    COLONOSCOPY N/A 7/3/2017    COLONOSCOPY with biopsies performed by Anjelica Arriaga MD at [facility]    HX APPENDECTOMY      HX BREAST BIOPSY Left     HX CATARACT REMOVAL      HX TUBAL LIGATION         Family History   Problem Relation Age of Onset    Hypertension Mother     Diabetes Mother     Cancer Father     Stroke Father        Social History     Socioeconomic History    Marital status:    Tobacco Use    Smoking status: Former Smoker     Packs/day: 1.00     Years: 45.00     Pack years: 45.00     Types: Cigarettes     Quit date: 2014     Years since quittin.4    Smokeless tobacco: Never Used   Vaping Use    Vaping Use: Never used   Substance and Sexual Activity    Alcohol use: No    Drug use: No       Prior to Admission medications    Medication Sig Start Date End Date Taking? Authorizing Provider   tiotropium bromide (Spiriva Respimat) 1.25 mcg/actuation inhaler Take 2 Puffs by inhalation daily. Patient not taking: Reported on 2021   Herson Marcano MD   HYDROcodone-acetaminophen [facility]) 5-325 mg per tablet Take 1 Tab by mouth every eight (8) hours as needed. 20   Provider, Historical   cholecalciferol (VITAMIN D3) (5000 Units/125 mcg) tab tablet Take 5,000 Units by mouth daily. Provider, Historical   ALPRAZolam (XANAX) 0.25 mg tablet Take 0.25 mg by mouth nightly as needed. Patient not taking: Reported on 2022    Provider, Historical   cyclobenzaprine (FLEXERIL) 10 mg tablet Take 5 mg by mouth three (3) times daily as needed. Provider, Historical   magnesium 250 mg tab Take 125 mg by mouth daily. Provider, Historical   niacin (SLO-NIACIN) 500 mg ER Tablet Take 2 Tabs by mouth nightly. Provider, Historical   OXYGEN-AIR DELIVERY SYSTEMS 2 L/min by Does Not Apply route nightly. Indications: DME: [facility]    Provider, Historical   Flaxseed Oil oil 1 Tab by Does Not Apply route daily. Provider, Historical   NIFEdipine ER (PROCARDIA XL) 30 mg ER tablet Take 30 mg by mouth daily. Provider, Historical   atenolol (TENORMIN) 50 mg tablet Take 25 mg by mouth daily. Provider, Historical   atorvastatin (LIPITOR) 40 mg tablet Take 80 mg by mouth daily. Provider, Historical   pantoprazole (PROTONIX) 40 mg tablet Take 40 mg by mouth two (2) times a day.   Patient not taking: Reported on 2022    Provider, Historical   zolpidem (AMBIEN) 10 mg tablet Take 5 mg by mouth nightly as needed for Sleep. Provider, Historical   levocetirizine (XYZAL) 5 mg tablet Take 5 mg by mouth daily. Provider, Historical   fluticasone (FLONASE) 50 mcg/actuation nasal spray 2 Sprays by Both Nostrils route two (2) times daily as needed. Patient not taking: Reported on 6/14/2022    Provider, Historical   montelukast (SINGULAIR) 10 mg tablet Take 10 mg by mouth nightly. Provider, Historical   multivitamin (ONE A DAY) tablet Take 1 Tab by mouth daily. Provider, Historical   aspirin delayed-release 81 mg tablet Take 81 mg by mouth daily. Provider, Historical   VITAMIN B COMPLEX (B COMPLETE PO) Take 1 Tab by mouth daily. Provider, Historical   potassium 99 mg tablet Take 45.5 mg by mouth daily. Provider, Historical       Allergies   Allergen Reactions    Benadryl [Diphenhydramine Hcl] Hives    Codeine Itching    Cymbalta [Duloxetine] Other (comments)     Shaking, feeling someone living inside my body, arms going numb.  Iodinated Contrast Media Hives     Pt states severe hives     Levaquin [Levofloxacin] Nausea Only    Lyrica [Pregabalin] Other (comments)     Shaking, feeling someone living in my body, numb arm. Any meds for fibromyialga    Other Medication Swelling     Pt wrote having an allergic reaction to \"tetnus\". \"Extreme swelling at site\"     Sulfur Hives         Review of Systems  GENERAL: No fever, chills, malaise, weight changes  HEENT: No change in vision, sinus congestion. NECK: No pain or stiffness. PULMONARY: + chronic, unchanged shortness of breath, no cough or wheeze. Cardiovascular: no pnd or orthopnea, no (currrnet) CP  GASTROINTESTINAL: No abdominal pain, nausea, vomiting or diarrhea  GENITOURINARY: No   dysuria. MUSCULOSKELETAL: no back pain, no recent trauma. DERMATOLOGIC: No wounds   ENDOCRINE: No recent change in weight. HEMATOLOGICAL: No anemia or easy bruising or bleeding. NEUROLOGIC: No headache, seizures, numbness, tingling or weakness. Physical Exam:     Physical Exam:  Visit Vitals  BP (!) 143/70   Pulse 60   Resp 14   Ht 5' 2\" (1.575 m)   Wt 80.7 kg (178 lb)   SpO2 94%   BMI 32.56 kg/m²    O2 Flow Rate (L/min): 1 l/min O2 Device: Nasal cannula    No data recorded. No intake/output data recorded. No intake/output data recorded. General:  Alert, cooperative, no distress, appears stated age. Head: Normocephalic, without obvious abnormality, atraumatic. Eyes:  Conjunctivae/corneas clear. PERRL, EOMs intact. Nose: Nares normal. No drainage or sinus tenderness. Neck: Supple, symmetrical, trachea midline, no adenopathy, thyroid: no enlargement, no carotid bruit and no JVD. Lungs:   Clear to auscultation bilaterally. On 1L NC. Heart:  Regular rate and rhythm, S1, S2 normal.     Abdomen: Soft, non-tender. Bowel sounds normal.    Extremities: Extremities normal, atraumatic, no cyanosis or edema. Pulses: 2+ and symmetric all extremities. Skin:  No rashes or lesions   Neurologic: AAOx3, No focal motor or sensory deficit. Labs Reviewed: All lab results for the last 24 hours reviewed.   Recent Results (from the past 24 hour(s))   EKG, 12 LEAD, INITIAL    Collection Time: 06/19/22  9:12 PM   Result Value Ref Range    Ventricular Rate 58 BPM    Atrial Rate 58 BPM    P-R Interval 162 ms    QRS Duration 80 ms    Q-T Interval 444 ms    QTC Calculation (Bezet) 435 ms    Calculated P Axis 53 degrees    Calculated R Axis -5 degrees    Calculated T Axis 45 degrees    Diagnosis       Sinus bradycardia  Otherwise normal ECG  When compared with ECG of 29-SEP-2017 10:10,  Questionable change in QRS axis  T wave inversion no longer evident in Inferior leads  T wave inversion no longer evident in Lateral leads     CBC WITH AUTOMATED DIFF    Collection Time: 06/19/22  9:23 PM   Result Value Ref Range    WBC 6.6 4.6 - 13.2 K/uL    RBC 3.88 (L) 4.20 - 5.30 M/uL    HGB 11.9 (L) 12.0 - 16.0 g/dL    HCT 36.9 35.0 - 45.0 %    MCV 95.1 78.0 - 100.0 FL    MCH 30.7 24.0 - 34.0 PG    MCHC 32.2 31.0 - 37.0 g/dL    RDW 13.8 11.6 - 14.5 %    PLATELET 686 673 - 174 K/uL    MPV 10.7 9.2 - 11.8 FL    NRBC 0.0 0  WBC    ABSOLUTE NRBC 0.00 0.00 - 0.01 K/uL    NEUTROPHILS 54 40 - 73 %    LYMPHOCYTES 31 21 - 52 %    MONOCYTES 10 3 - 10 %    EOSINOPHILS 5 0 - 5 %    BASOPHILS 1 0 - 2 %    IMMATURE GRANULOCYTES 0 0.0 - 0.5 %    ABS. NEUTROPHILS 3.6 1.8 - 8.0 K/UL    ABS. LYMPHOCYTES 2.1 0.9 - 3.6 K/UL    ABS. MONOCYTES 0.6 0.05 - 1.2 K/UL    ABS. EOSINOPHILS 0.3 0.0 - 0.4 K/UL    ABS. BASOPHILS 0.1 0.0 - 0.1 K/UL    ABS. IMM. GRANS. 0.0 0.00 - 0.04 K/UL    DF AUTOMATED     METABOLIC PANEL, COMPREHENSIVE    Collection Time: 06/19/22  9:23 PM   Result Value Ref Range    Sodium 142 136 - 145 mmol/L    Potassium 3.9 3.5 - 5.5 mmol/L    Chloride 106 100 - 111 mmol/L    CO2 31 21 - 32 mmol/L    Anion gap 5 3.0 - 18 mmol/L    Glucose 141 (H) 74 - 99 mg/dL    BUN 11 7.0 - 18 MG/DL    Creatinine 1.01 0.6 - 1.3 MG/DL    BUN/Creatinine ratio 11 (L) 12 - 20      GFR est AA >60 >60 ml/min/1.73m2    GFR est non-AA 54 (L) >60 ml/min/1.73m2    Calcium 8.8 8.5 - 10.1 MG/DL    Bilirubin, total 0.4 0.2 - 1.0 MG/DL    ALT (SGPT) 27 13 - 56 U/L    AST (SGOT) 32 10 - 38 U/L    Alk.  phosphatase 104 45 - 117 U/L    Protein, total 6.8 6.4 - 8.2 g/dL    Albumin 3.6 3.4 - 5.0 g/dL    Globulin 3.2 2.0 - 4.0 g/dL    A-G Ratio 1.1 0.8 - 1.7     TROPONIN-HIGH SENSITIVITY    Collection Time: 06/19/22  9:23 PM   Result Value Ref Range    Troponin-High Sensitivity 7 0 - 54 ng/L   TROPONIN-HIGH SENSITIVITY    Collection Time: 06/19/22 10:40 PM   Result Value Ref Range    Troponin-High Sensitivity 20 0 - 54 ng/L   TROPONIN-HIGH SENSITIVITY    Collection Time: 06/20/22  1:45 AM   Result Value Ref Range    Troponin-High Sensitivity 39 0 - 54 ng/L        XR Results (most recent):  Results from Hospital Encounter encounter on 06/19/22    XR CHEST PORT    Narrative  Portable CXR:    HISTORY: Chest pain. Cardiac silhouette is normal in size with ectatic aorta, stable from October 2021. No vascular congestion. Mild atelectasis in the lung bases. No  consolidation or pleural effusion. Impression  Ectatic aorta, stable. Bibasilar atelectasis. CT Results  (Last 48 hours)               06/20/22 0436  CTA CHEST ABD PELV W WO CONT Preliminary result    Narrative:  *THIS IS A PRELIMINARY REPORT - PLEASE REVIEW THE FINAL REPORT AS THERE MAY BE   CHANGES AND ADDITIONAL INFORMATION OR FINDINGS*       INDICATION: r/o aortic dissection       STUDY: CT angiogram of the chest, abdomen and pelvis       COMPARISON: CT dated 10/27/2021       PRELIMINARY RESULTS:        1. The thoracic and abdominal aorta are without evidence of dissection. There   is aneurysm of the distal descending and the infrarenal abdominal aorta. 2.  Coronary artery calcifications. Procedures/imaging: see electronic medical records for all procedures/Xrays and details which were not copied into this note but were reviewed prior to creation of Plan      Assessment/Plan     Active Problems:    * No active hospital problems. *     Assessment  1. Chest pain, now resolved  2. H/o descending thoracic aneurysm - measured 3.8 at diaphragm in Nov '21, abdominal aorta measured 3 cm, Ascending aorta 3.5 cm. Unchanged on today's CTA chest/abd/pelvis. 3. Sinus bradycardia   4. Detectable troponin. #1-4. Admit as ED uncomfortable discharging from ED. Cardiology consult. Echo for completeness. Will make NPO in the chance that stress test, etc needs to be completed. Will hold BB in case of stress test and in the light sinus bradycardia. 5. Nocturnal hypoxemia on 2L NC  6. Asthma and obstructive bronchitis  7. Mild pulmonary HTN   #5-7. Follows with Dr. Aaron Perez OP. Continue NC as needed. 8. Right upper lobe nodule - 6 mm  9.  Esophageal structure s/p dilatation     Diet:  NPO   DVT/GI Prophylaxis: Lovenox  Code Status: full     Contact:    Discussed with patient at bedside about hospital admission and plan care. He understands and agrees. All questions answered. Disclaimer: Sections of this note are dictated using utilizing voice recognition software. Minor typographical errors may be present. If questions arise, please do not hesitate to contact me or call our department.         Mariella Lloyd DO  [facility]

## 2022-06-21 VITALS
HEIGHT: 62 IN | OXYGEN SATURATION: 94 % | SYSTOLIC BLOOD PRESSURE: 157 MMHG | HEART RATE: 69 BPM | RESPIRATION RATE: 20 BRPM | WEIGHT: 178 LBS | BODY MASS INDEX: 32.76 KG/M2 | TEMPERATURE: 98.1 F | DIASTOLIC BLOOD PRESSURE: 75 MMHG

## 2022-06-21 PROCEDURE — 99232 SBSQ HOSP IP/OBS MODERATE 35: CPT | Performed by: INTERNAL MEDICINE

## 2022-06-21 PROCEDURE — 74011250637 HC RX REV CODE- 250/637: Performed by: INTERNAL MEDICINE

## 2022-06-21 PROCEDURE — 99233 SBSQ HOSP IP/OBS HIGH 50: CPT | Performed by: INTERNAL MEDICINE

## 2022-06-21 PROCEDURE — 74011000250 HC RX REV CODE- 250: Performed by: STUDENT IN AN ORGANIZED HEALTH CARE EDUCATION/TRAINING PROGRAM

## 2022-06-21 PROCEDURE — 74011250637 HC RX REV CODE- 250/637: Performed by: PHYSICIAN ASSISTANT

## 2022-06-21 PROCEDURE — 99239 HOSP IP/OBS DSCHRG MGMT >30: CPT | Performed by: INTERNAL MEDICINE

## 2022-06-21 PROCEDURE — 74011250637 HC RX REV CODE- 250/637: Performed by: STUDENT IN AN ORGANIZED HEALTH CARE EDUCATION/TRAINING PROGRAM

## 2022-06-21 PROCEDURE — 74011250636 HC RX REV CODE- 250/636: Performed by: STUDENT IN AN ORGANIZED HEALTH CARE EDUCATION/TRAINING PROGRAM

## 2022-06-21 RX ORDER — IPRATROPIUM BROMIDE 0.5 MG/2.5ML
0.5 SOLUTION RESPIRATORY (INHALATION)
Status: DISCONTINUED | OUTPATIENT
Start: 2022-06-21 | End: 2022-06-21 | Stop reason: HOSPADM

## 2022-06-21 RX ORDER — ATORVASTATIN CALCIUM 40 MG/1
80 TABLET, FILM COATED ORAL
Status: DISCONTINUED | OUTPATIENT
Start: 2022-06-21 | End: 2022-06-21 | Stop reason: HOSPADM

## 2022-06-21 RX ADMIN — ATENOLOL 25 MG: 25 TABLET ORAL at 10:06

## 2022-06-21 RX ADMIN — MULTIPLE VITAMINS W/ MINERALS TAB 1 TABLET: TAB at 09:07

## 2022-06-21 RX ADMIN — ASPIRIN 81 MG: 81 TABLET, COATED ORAL at 09:07

## 2022-06-21 RX ADMIN — PANTOPRAZOLE SODIUM 40 MG: 40 TABLET, DELAYED RELEASE ORAL at 09:07

## 2022-06-21 RX ADMIN — ENOXAPARIN SODIUM 40 MG: 100 INJECTION SUBCUTANEOUS at 09:07

## 2022-06-21 RX ADMIN — IPRATROPIUM BROMIDE 0.5 MG: 0.5 SOLUTION RESPIRATORY (INHALATION) at 01:50

## 2022-06-21 NOTE — ED NOTES
Patient's oxygen saturation 88-89% on 2 liters,  Increased to 3 liters with no improvement , oxygen increased to 4 liters

## 2022-06-21 NOTE — ED NOTES
informed that patient called on call light stating \" I felt my heart racing for a few minutes\" Patient denies pain at present assessment. Call light left within reach.

## 2022-06-21 NOTE — PROGRESS NOTES
[facility] - Progress Note  Admit Date: 6/19/2022    Assessment:     -Atypical chest pain, negative troponin x 4  · CTA negative for pulmonary emboli or acute aortic pathology. -h/o CAD, pharm nuc 6/20/22, no ischemia, normal EF  -Cardiac cath 6/25/2021 with findings as follows:  · Dominance: Right  · LM: Normal takeoff, no significant CAD  · LAD: Mild 20% stenosis in proximal and mid segments. Tortuous mid and distal LAD with no significant CAD. Diag free of significant disease. · LCx: 20% proximal stenosis. Rest of LCx/OM with mild luminal irregularities. · RCA: Mid 50-60% stenosis, rest of RCA with mild luminal irregularities. PDA and PL free of significant disease. ? iFR performed on mid RCA lesion, negative at 0.95.  -Echo 6/20/22 with EF 55%, no pulmonary HTN  -h/o aortic aneurysm, CTA 6/20/22, 3.4 at sinus valvsalva, 3.8 distal thoracic aorta  -HTN, BP up to 179/81 in ER, currently controlled. -Hypercholesterolemia  -COPD, mild by chest CT 6/20/22  -Sleep apnea, uses O2 at [facility] at night.  -Fibromyalgia     Primary cardiologist is at [facility]:     I reviewed Rup results of stress test showing low risk. Echocardiogram is normal.  CTA showed aneurysm less than 4 cm. Pain is resolved. She does have an element of sleep disorder requiring oxygen at night and this was discussed. This pain was different from her previous fibromyalgia pain. I discussed with Dr. Mandeep Cazares and patient she can discharge home. She needs to follow-up with cardiologist either from [address] [place] [address] or since her provider recently moved, she can see Dr. Guadalupe Mckeon as an outpatient all questions answered. Please call if questions. If pain recurs despite controlled blood pressure I discussed proceeding to heart catheterization as an outpatient for completeness. `    Subjective:     No new complaints.      Objective:      Patient Vitals for the past 8 hrs:   Temp Pulse Resp BP SpO2   06/21/22 0805 97.7 °F (36.5 °C) 89 20 (!) 152/88 97 %   06/21/22 0645 -- 82 18 127/67 96 %   06/21/22 0502 -- 73 13 (!) 116/59 (!) 89 %   06/21/22 0421 -- (P) 96 -- -- --   06/21/22 0402 -- 91 12 132/62 90 %   06/21/22 0253 -- 90 15 125/63 94 %         Patient Vitals for the past 96 hrs:   Weight   06/20/22 1243 80.7 kg (178 lb)   06/19/22 2129 80.7 kg (178 lb)                  No intake or output data in the 24 hours ending 06/21/22 0905    Physical Exam:  General:  alert, cooperative, no distress, appears stated age  Neck:  nontender  Lungs:  clear to auscultation bilaterally  Heart:  regular rate and rhythm, S1, S2 normal, no murmur, click, rub or gallop  Abdomen:  abdomen is soft without significant tenderness, masses, organomegaly or guarding  Extremities:  extremities normal, atraumatic, no cyanosis or edema    Data Review:     Labs: Results:       Chemistry Recent Labs     06/19/22 2123   *      K 3.9      CO2 31   BUN 11   CREA 1.01   CA 8.8   AGAP 5   BUCR 11*      TP 6.8   ALB 3.6   GLOB 3.2   AGRAT 1.1      CBC w/Diff Recent Labs     06/19/22 2123   WBC 6.6   RBC 3.88*   HGB 11.9*   HCT 36.9      GRANS 54   LYMPH 31   EOS 5      Cardiac Enzymes No results found for: CPK, CK, CKMMB, CKMB, RCK3, CKMBT, CKNDX, CKND1, DARYN, TROPT, TROIQ, ELI, TROPT, TNIPOC, BNP, BNPP   Coagulation No results for input(s): PTP, INR, APTT, INREXT in the last 72 hours.     Lipid Panel Lab Results   Component Value Date/Time    Cholesterol, total 156 05/20/2022 12:33 PM    HDL Cholesterol 61 (H) 05/20/2022 12:33 PM    LDL, calculated 58.6 05/20/2022 12:33 PM    VLDL, calculated 36.4 05/20/2022 12:33 PM    Triglyceride 182 (H) 05/20/2022 12:33 PM    CHOL/HDL Ratio 2.6 05/20/2022 12:33 PM      BNP No results found for: BNP, BNPP, XBNPT   Liver Enzymes Recent Labs     06/19/22 2123   TP 6.8   ALB 3.6         Digoxin    Thyroid Studies Lab Results   Component Value Date/Time    TSH 0.97 05/20/2022 12:33 PM          Signed By: Shayy Almeida Octavio Herzog MD     June 21, 2022

## 2022-06-21 NOTE — PROGRESS NOTES
TRANSFER - IN REPORT:    Verbal report received from [facility] SERV) on Rula Hernandez  being received from ED(unit) for routine progression of care      Report consisted of patients Situation, Background, Assessment and   Recommendations(SBAR). Information from the following report(s) SBAR was reviewed with the receiving nurse. Opportunity for questions and clarification was provided. Assessment completed upon patients arrival to unit and care assumed.

## 2022-06-21 NOTE — PROGRESS NOTES
INTERIM UPDATE - 2153 EST on 6/20/2022    Called by Nursing Staff to speak with Patient and Patient's Family concerning the results of various tests performed this visit and to explain prognosis in terms of likelihood of AM discharge. Relayed results of Echocardiogram, Cardiac Pharmacological Stress Test, and Troponins. Advised that Patient is likely to be discharged in AM; however, commented that there may be additional concerns given her history of Aortic Dissection (which was reportedly stable). Nursing Staff kindly offered to transfer Patient to hospital bed in ER rather than the less comfortable ER bed. Plan:  RESUME PM Nifedipine XL 30 mg and added PRN Topical Nitroglycerin 0.5\" q6hrs for SBP >=160 mm Hg.

## 2022-06-21 NOTE — PROGRESS NOTES
PT orders received and chart reviewed. PT eve attempted at 0954, pt reporting ambulating independently with no questions or concerns regarding mobility at this time. Pt lives with  and has RW at home if needed. Discussed if change in mobility to inform physician for new PT order. Will sign off.     Thank you for this referral  Sadie Werner  PT DPT

## 2022-06-21 NOTE — DISCHARGE INSTRUCTIONS
Patient Education        Chest Pain: Care Instructions  Your Care Instructions     There are many things that can cause chest pain. Some are not serious and will get better on their own in a few days. But some kinds of chest pain need more testing and treatment. Your doctor may have recommended a follow-up visit in the next 8 to 12 hours. If you are not getting better, you may need more tests or treatment. Even though your doctor has released you, you still need to watch for any problems. The doctor carefully checked you, but sometimes problems can develop later. If you have new symptoms or if your symptoms do not get better, get medical care right away. If you have worse or different chest pain or pressure that lasts more than 5 minutes or you passed out (lost consciousness), call 911 or seek other emergency help right away. A medical visit is only one step in your treatment. Even if you feel better, you still need to do what your doctor recommends, such as going to all suggested follow-up appointments and taking medicines exactly as directed. This will help you recover and help prevent future problems. How can you care for yourself at home? · Rest until you feel better. · Take your medicine exactly as prescribed. Call your doctor if you think you are having a problem with your medicine. · Do not drive after taking a prescription pain medicine. When should you call for help? Call 911 if:     · You passed out (lost consciousness).     · You have severe difficulty breathing.     · You have symptoms of a heart attack. These may include:  ? Chest pain or pressure, or a strange feeling in your chest.  ? Sweating. ? Shortness of breath. ? Nausea or vomiting. ? Pain, pressure, or a strange feeling in your back, neck, jaw, or upper belly or in one or both shoulders or arms. ? Lightheadedness or sudden weakness. ? A fast or irregular heartbeat.   After you call 911, the  may tell you to chew 1 adult-strength or 2 to 4 low-dose aspirin. Wait for an ambulance. Do not try to drive yourself. Call your doctor today if:     · You have any trouble breathing.     · Your chest pain gets worse.     · You are dizzy or lightheaded, or you feel like you may faint.     · You are not getting better as expected.     · You are having new or different chest pain. Where can you learn more? Go to http://www.[employer].com/  Enter A120 in the search box to learn more about \"Chest Pain: Care Instructions. \"  Current as of: July 1, 2021               Content Version: 13.2  © 0752-2177 [employer]. Care instructions adapted under license by [employer] (which disclaims liability or warranty for this information). If you have questions about a medical condition or this instruction, always ask your healthcare professional. [place] [address] any warranty or liability for your use of this information. DISCHARGE SUMMARY from Nurse    PATIENT INSTRUCTIONS:    After general anesthesia or intravenous sedation, for 24 hours or while taking prescription Narcotics:  · Limit your activities  · Do not drive and operate hazardous machinery  · Do not make important personal or business decisions  · Do  not drink alcoholic beverages  · If you have not urinated within 8 hours after discharge, please contact your surgeon on call.     Report the following to your surgeon:  · Excessive pain, swelling, redness or odor of or around the surgical area  · Temperature over 100.5  · Nausea and vomiting lasting longer than 4 hours or if unable to take medications  · Any signs of decreased circulation or nerve impairment to extremity: change in color, persistent  numbness, tingling, coldness or increase pain  · Any questions    What to do at Home:  Recommended activity: Activity as tolerated, Rest as needed     If you experience any of the following symptoms Chest Pain, Worsening Shortness of breath, Dizziness,Fainting,  please follow up with Primary Care Provider or go to the nearest Emergency Room. *  Please give a list of your current medications to your Primary Care Provider. *  Please update this list whenever your medications are discontinued, doses are      changed, or new medications (including over-the-counter products) are added. *  Please carry medication information at all times in case of emergency situations. These are general instructions for a healthy lifestyle:    No smoking/ No tobacco products/ Avoid exposure to second hand smoke  Surgeon General's Warning:  Quitting smoking now greatly reduces serious risk to your health. Obesity, smoking, and sedentary lifestyle greatly increases your risk for illness    A healthy diet, regular physical exercise & weight monitoring are important for maintaining a healthy lifestyle    You may be retaining fluid if you have a history of heart failure or if you experience any of the following symptoms:  Weight gain of 3 pounds or more overnight or 5 pounds in a week, increased swelling in our hands or feet or shortness of breath while lying flat in bed. Please call your doctor as soon as you notice any of these symptoms; do not wait until your next office visit. The discharge information has been reviewed with the patient. The patient verbalized understanding. Discharge medications reviewed with the patient and appropriate educational materials and side effects teaching were provided.   Patient armband removed and shredded  ___________________________________________________________________________________________________________________________________

## 2022-06-21 NOTE — ED NOTES
Patient sitting up in bed inquiring about bed status for admission . At present inpatient bed unavailable . Patient verbalized that she has been waiting for the doctor to follow up as promised. Dr. Hamilton Marie paged whom is at bedside discussing plan of care with patient and daughter. Both patient and daughter verbalized understanding . Patient provided a hospital bed and pillow. Patient verbalized comfort with change of bed. Call light left within reach.

## 2022-06-21 NOTE — PROGRESS NOTES
Physician Progress Note      PATIENT:               Monae Menon  CSN #:                  [id number]  :                       1950  ADMIT DATE:       2022 9:03 PM  [address] [place] DATE:  RESPONDING  PROVIDER #:        Megan Husain MD          QUERY TEXT:    Dear Hospitalist  Pt admitted with chest pain . Pt noted to have been on protonix  prior to admit and during this admission. If possible, please document in progress notes and discharge summary if you are evaluating and/or treating any of the following: The medical record reflects the following:  Risk Factors:  HTN;   HLD  Clinical Indicators:  negative cardiac  enzymes  stress test  result- low risk  stress test;  echo unchanged from previous; Pt was taking protonix  40mg  po  BID PTA  and during this admit. Treatment: protonix  40mg po BID  continues ; stress test    Thank you,  [email or web address]  Options provided:  -- Chest pain due to GERD  -- Chest pain due to, please  give  diagnosis. -- Other - I will add my own diagnosis  -- Disagree - Not applicable / Not valid  -- Disagree - Clinically unable to determine / Unknown  -- Refer to Clinical Documentation Reviewer    PROVIDER RESPONSE TEXT:    This patient has chest pain due to GERD.     Query created by: Noe Friedman on 2022 8:46 AM      Electronically signed by:  Megan Husain MD 2022 8:59 AM

## 2022-06-21 NOTE — PROGRESS NOTES
Hospitalist Progress Note    Subjective:   Daily Progress Note: 6/21/2022     Patient was seen in presence of cardiologist.  Patient does not have any chest pain or shortness of breath currently. No cough. No nausea or vomiting. Patient uses 2 L oxygen at nighttime for sleep apnea. She is being followed by pulmonologist as an outpatient. No headaches or dizziness. Current Facility-Administered Medications   Medication Dose Route Frequency    atorvastatin (LIPITOR) tablet 80 mg  80 mg Oral QHS    sodium chloride (NS) flush 5-40 mL  5-40 mL IntraVENous Q8H    sodium chloride (NS) flush 5-40 mL  5-40 mL IntraVENous PRN    acetaminophen (TYLENOL) tablet 650 mg  650 mg Oral Q6H PRN    Or    acetaminophen (TYLENOL) suppository 650 mg  650 mg Rectal Q6H PRN    polyethylene glycol (MIRALAX) packet 17 g  17 g Oral DAILY PRN    ondansetron (ZOFRAN ODT) tablet 4 mg  4 mg Oral Q8H PRN    Or    ondansetron (ZOFRAN) injection 4 mg  4 mg IntraVENous Q6H PRN    enoxaparin (LOVENOX) injection 40 mg  40 mg SubCUTAneous DAILY    montelukast (SINGULAIR) tablet 10 mg  10 mg Oral QHS    pantoprazole (PROTONIX) tablet 40 mg  40 mg Oral BID    zolpidem (AMBIEN) tablet 5 mg  5 mg Oral QHS PRN    ipratropium (ATROVENT) 0.02 % nebulizer solution 0.5 mg  0.5 mg Nebulization Q8H    aspirin delayed-release tablet 81 mg  81 mg Oral DAILY    atenoloL (TENORMIN) tablet 25 mg  25 mg Oral DAILY    multivitamin, tx-iron-ca-min (THERA-M w/ IRON) tablet 1 Tablet  1 Tablet Oral DAILY    nitroglycerin (NITROBID) 2 % ointment 0.5 Inch  0.5 Inch Topical Q6H PRN    NIFEdipine ER (PROCARDIA XL) tablet 30 mg  30 mg Oral QPM        Review of Systems  Pertinent items are noted in HPI.     Objective:     Visit Vitals  BP (!) (P) 157/75 (BP 1 Location: Left upper arm, BP Patient Position: At rest)   Pulse (P) 69   Temp (P) 98.1 °F (36.7 °C)   Resp (P) 20   Ht 5' 2\" (1.575 m)   Wt 80.7 kg (178 lb)   SpO2 (P) 94%   BMI 32.56 kg/m²    O2 Flow Rate (L/min): 2 l/min O2 Device: (P) Nasal cannula    Temp (24hrs), Av °F (36.7 °C), Min:97.7 °F (36.5 °C), Max:98.1 °F (36.7 °C)      No intake/output data recorded. No intake/output data recorded. General appearance - alert, well appearing, and in no distress  Chest -clear air entry noted in bases, no wheezes  Heart - S1 and S2 normal  Abdomen - soft, nontender, nondistended, Bowel sounds present  Neurological - alert, oriented, normal speech, no focal findings noted currently.   Musculoskeletal - no joint tenderness or erythema of knees bilaterally  Extremities - no pedal edema noted      Data Review    Recent Results (from the past 24 hour(s))   ECHO ADULT COMPLETE    Collection Time: 22 11:08 AM   Result Value Ref Range    IVSd 1.2 (A) 0.6 - 0.9 cm    LVIDd 3.5 (A) 3.9 - 5.3 cm    LVIDs 2.4 cm    LVOT Diameter 1.8 cm    LVPWd 1.2 (A) 0.6 - 0.9 cm    LVOT Peak Gradient 7 mmHg    LVOT Mean Gradient 4 mmHg    LVOT SV 79.6 ml    LVOT Peak Velocity 1.3 m/s    LVOT VTI 31.3 cm    RV Longitudinal Dimension 4.0 cm    LA Volume A/L 62 mL    LA Volume 2C 64 (A) 22 - 52 mL    LA Volume 4C 52 22 - 52 mL    AV Area by Peak Velocity 1.6 cm2    AV Area by Peak Velocity 1.8 cm2    AV Area by VTI 1.9 cm2    AR .4 millisecond    AR Max Velocity PISA 4.4 m/s    AV Peak Gradient 12 mmHg    AV Peak Gradient 16 mmHg    AV Mean Gradient 7 mmHg    AV Peak Velocity 2.0 m/s    AV Peak Velocity 1.8 m/s    AV Mean Velocity 1.3 m/s    AV VTI 39.7 cm    MV A Velocity 0.91 m/s    MV E Wave Deceleration Time 151.5 ms    MV E Velocity 0.51 m/s    LV E' Lateral Velocity 4 cm/s    LV E' Septal Velocity 4 cm/s    TR Peak Gradient 31 mmHg    TR Max Velocity 2.77 m/s    Fractional Shortening 2D 31 28 - 44 %    LVIDd Index 1.92 cm/m2    LVIDs Index 1.32 cm/m2    LV RWT Ratio 0.69     LV Mass 2D 135.8 67 - 162 g    LV Mass 2D Index 74.6 43 - 95 g/m2    MV E/A 0.56     E/E' Ratio (Averaged) 12.75     E/E' Lateral 12.75 E/E' Septal 12.75     LA Volume Index A/L 34 16 - 34 mL/m2    LVOT Stroke Volume Index 43.7 mL/m2    LVOT Area 2.5 cm2    LA Volume Index 2C 35 (A) 16 - 34 mL/m2    LA Volume Index 4C 29 16 - 34 mL/m2    LVOT:AV VTI Index 0.79     AZALEA/BSA VTI 1.0 cm2/m2    Est. RA Pressure 3 mmHg    RVSP 34 mmHg    PASP 34 mmHg   NUCLEAR CARDIAC STRESS TEST    Collection Time: 06/20/22  1:34 PM   Result Value Ref Range    Stress Target  bpm    Exercise Duration Time 4 min    Exercuse Duration Seconds 0 sec    Stress Systolic  mmHg    Stress Diastolic BP 74 mmHg    Stress Peak  BPM    Baseline HR 82 BPM    Stress Estimated Workload 1.0 METS    Stress Percent HR Achieved 81 %    Stress Rate Pressure Product 16,680 BPM*mmHg    Baseline Systolic  mmHg    Baseline Diastolic BP 77 mmHg    TID 0.94     Nuc Stress EF 81 %         Assessment/Plan:     Active Problems:    Chest pain (6/20/2022)      ASSESSMENT:    1. Atypical chest pain could be due to GERD and/or fibromyalgia or musculoskeletal.  2.  Hypertension  3. Diarrhea x1, resolved currently  4. Dyslipidemia  5. COPD with sleep apnea  6. Fibromyalgia    PLAN:    Echocardiogram and Lexiscan report reviewed. Cardiology input noted  Patient will follow with pulmonologist for outpatient sleep study  Continue aspirin, atenolol, Lipitor, Protonix, Singulair and nebulizer. PT and OT will be ordered  Discharge patient home today. Total time to take care of this patient was 25 minutes and more than 50% of time was spent counseling and coordinating care. Disclaimer: Sections of this note are dictated using utilizing voice recognition software, which may have resulted in some phonetic based errors in grammar and contents. Even though attempts were made to correct all the mistakes, some may have been missed, and remained in the body of the document. If questions arise, please contact our department.

## 2022-06-21 NOTE — PROGRESS NOTES
Discharge instructions reviewed with patient. All questions answered. PIV removed. Patient brought down via wheelchair for discharge to home.

## 2022-06-21 NOTE — DISCHARGE SUMMARY
Physician Discharge Summary       Patient: Eros Wahl MRN: [id number]  SSN: [id number]    YOB: 1950  Age: 66 y.o. Sex: female    PCP: Aaron Prabhakar MD    Allergies: Benadryl [diphenhydramine hcl], Codeine, Cymbalta [duloxetine], Iodinated contrast media, Levaquin [levofloxacin], Lyrica [pregabalin], Other medication, and Sulfur    Admit date: 6/19/2022  Admitting Provider: Yemi Burton DO    Discharge date: 6/21/2022  Discharging Provider: Miguel Meneses MD    * Admission Diagnoses: Chest pain [R07.9]    * Discharge Diagnoses:      1. Atypical chest pain could be due to GERD and/or fibromyalgia or musculoskeletal.  2.  Hypertension  3. Diarrhea x1, resolved currently  4. Dyslipidemia  5. COPD with sleep apnea  6. Fibromyalgia    * Hospital Course: Presented to hospital with complaint of chest pain associated with hypertension. Please refer to hospital admission H&P for further detail. Patient had negative cardiac biomarkers. Because of her risk factors, patient underwent echocardiogram and nuclear stress test.  Echocardiogram did not show any wall motion abnormality with preserved EF. Nuclear stress test was also low risk. Patient has history of GERD and fibromyalgia. Patient has history of obstructive sleep apnea and has been on 2 L oxygen. Patient was continued on home medication for the comorbidities. Patient was seen by cardiology service and recommended medical management at this point as patient is chest pain-free and has low risk stress test.  Patient will be followed by cardiologist as an outpatient. Patient had CTA chest which was negative for PE and also showed aneurysm less than 4 cm size. Patient will be discharged home on the following medications today.     * Procedures: None  * No surgery found *      Consults: Cardiology    Significant Diagnostic Studies: Chest x-ray, CTA chest, echocardiogram and nuclear stress test    Discharge Exam:  Please refer to my progress note from June 21, 2022 for further detail    * Discharge Condition: improved  * Disposition: Home    Discharge Medications:  Current Discharge Medication List      CONTINUE these medications which have NOT CHANGED    Details   tiotropium bromide (Spiriva Respimat) 1.25 mcg/actuation inhaler Take 2 Puffs by inhalation daily. Qty: 4 g, Refills: 0      HYDROcodone-acetaminophen (NORCO) 5-325 mg per tablet Take 1 Tab by mouth every eight (8) hours as needed. cholecalciferol (VITAMIN D3) (5000 Units/125 mcg) tab tablet Take 5,000 Units by mouth daily. cyclobenzaprine (FLEXERIL) 10 mg tablet Take 5 mg by mouth three (3) times daily as needed. magnesium 250 mg tab Take 125 mg by mouth daily. niacin (SLO-NIACIN) 500 mg ER Tablet Take 2 Tabs by mouth nightly. OXYGEN-AIR DELIVERY SYSTEMS 2 L/min by Does Not Apply route nightly. Indications: DME: [facility]      Flaxseed Oil oil 1 Tab by Does Not Apply route daily. NIFEdipine ER (PROCARDIA XL) 30 mg ER tablet Take 30 mg by mouth daily. atenolol (TENORMIN) 50 mg tablet Take 25 mg by mouth daily. atorvastatin (LIPITOR) 40 mg tablet Take 80 mg by mouth daily. pantoprazole (PROTONIX) 40 mg tablet Take 40 mg by mouth two (2) times a day. zolpidem (AMBIEN) 10 mg tablet Take 5 mg by mouth nightly as needed for Sleep.      levocetirizine (XYZAL) 5 mg tablet Take 5 mg by mouth daily. fluticasone (FLONASE) 50 mcg/actuation nasal spray 2 Sprays by Both Nostrils route two (2) times daily as needed. montelukast (SINGULAIR) 10 mg tablet Take 10 mg by mouth nightly. multivitamin (ONE A DAY) tablet Take 1 Tab by mouth daily. aspirin delayed-release 81 mg tablet Take 81 mg by mouth daily. VITAMIN B COMPLEX (B COMPLETE PO) Take 1 Tab by mouth daily. potassium 99 mg tablet Take 45.5 mg by mouth daily. * Follow-up Care/Patient Instructions:   Activity: Activity as tolerated  Diet: Cardiac Diet  Wound Care: None needed    Follow-up Information     Follow up With Specialties Details Why Contact Info    Marcus Torres MD General Practice   [address] [facility]  [place] [id number] [phone]          Follow-up Appointments   Procedures    FOLLOW UP VISIT Appointment in: Other ([address]) With PCP in 5 days With dr. Caryn Villalba in 3-4 weeks With Dr. Sander Shukla is a new patient for sleep apnea in 2 to 3 weeks     With PCP in 5 days  With dr. Caryn Villalba in 3-4 weeks  With Dr. Sander Shukla is a new patient for sleep apnea in 2 to 3 weeks     Standing Status:   Standing     Number of Occurrences:   1     Order Specific Question:   Appointment in     Answer: Other (Specify)     Total time of discharge greater than 35 minutes    Disclaimer: Sections of this note are dictated using utilizing voice recognition software, which may have resulted in some phonetic based errors in grammar and contents. Even though attempts were made to correct all the mistakes, some may have been missed, and remained in the body of the document. If questions arise, please contact our department.       Signed:  Abdi Capps MD  6/21/2022  9:36 AM

## 2022-08-18 ENCOUNTER — TRANSCRIBE ORDER (OUTPATIENT)
Dept: SCHEDULING | Age: 72
End: 2022-08-18

## 2022-08-18 DIAGNOSIS — N18.32 STAGE 3B CHRONIC KIDNEY DISEASE (HCC): Primary | ICD-10-CM

## 2022-08-19 ENCOUNTER — OFFICE VISIT (OUTPATIENT)
Dept: CARDIOLOGY CLINIC | Age: 72
End: 2022-08-19
Payer: MEDICARE

## 2022-08-19 VITALS
SYSTOLIC BLOOD PRESSURE: 138 MMHG | HEIGHT: 62 IN | BODY MASS INDEX: 33.68 KG/M2 | WEIGHT: 183 LBS | HEART RATE: 70 BPM | DIASTOLIC BLOOD PRESSURE: 88 MMHG | OXYGEN SATURATION: 96 %

## 2022-08-19 DIAGNOSIS — I25.10 CORONARY ARTERY DISEASE INVOLVING NATIVE CORONARY ARTERY OF NATIVE HEART WITHOUT ANGINA PECTORIS: Primary | ICD-10-CM

## 2022-08-19 DIAGNOSIS — I10 HYPERTENSION, UNSPECIFIED TYPE: ICD-10-CM

## 2022-08-19 DIAGNOSIS — E78.00 HYPERCHOLESTEROLEMIA: ICD-10-CM

## 2022-08-19 DIAGNOSIS — I71.9 DESCENDING AORTIC ANEURYSM (HCC): ICD-10-CM

## 2022-08-19 PROCEDURE — G8754 DIAS BP LESS 90: HCPCS | Performed by: INTERNAL MEDICINE

## 2022-08-19 PROCEDURE — 3017F COLORECTAL CA SCREEN DOC REV: CPT | Performed by: INTERNAL MEDICINE

## 2022-08-19 PROCEDURE — 99204 OFFICE O/P NEW MOD 45 MIN: CPT | Performed by: INTERNAL MEDICINE

## 2022-08-19 PROCEDURE — G8752 SYS BP LESS 140: HCPCS | Performed by: INTERNAL MEDICINE

## 2022-08-19 PROCEDURE — G9899 SCRN MAM PERF RSLTS DOC: HCPCS | Performed by: INTERNAL MEDICINE

## 2022-08-19 PROCEDURE — G8427 DOCREV CUR MEDS BY ELIG CLIN: HCPCS | Performed by: INTERNAL MEDICINE

## 2022-08-19 PROCEDURE — 93000 ELECTROCARDIOGRAM COMPLETE: CPT | Performed by: INTERNAL MEDICINE

## 2022-08-19 PROCEDURE — G8399 PT W/DXA RESULTS DOCUMENT: HCPCS | Performed by: INTERNAL MEDICINE

## 2022-08-19 PROCEDURE — G8417 CALC BMI ABV UP PARAM F/U: HCPCS | Performed by: INTERNAL MEDICINE

## 2022-08-19 PROCEDURE — G8536 NO DOC ELDER MAL SCRN: HCPCS | Performed by: INTERNAL MEDICINE

## 2022-08-19 PROCEDURE — 1123F ACP DISCUSS/DSCN MKR DOCD: CPT | Performed by: INTERNAL MEDICINE

## 2022-08-19 PROCEDURE — 1101F PT FALLS ASSESS-DOCD LE1/YR: CPT | Performed by: INTERNAL MEDICINE

## 2022-08-19 PROCEDURE — 1090F PRES/ABSN URINE INCON ASSESS: CPT | Performed by: INTERNAL MEDICINE

## 2022-08-19 PROCEDURE — G8510 SCR DEP NEG, NO PLAN REQD: HCPCS | Performed by: INTERNAL MEDICINE

## 2022-08-19 NOTE — PROGRESS NOTES
Gypsy Cannon presents today for   Chief Complaint   Patient presents with    New Patient     Referred by pcp for htn    Shortness of [address] preferred language for health care discussion is english/other. Is someone accompanying this pt? no    Is the patient using any DME equipment during [address]? no    Depression Screening:  3 most recent PHQ Screens 8/19/2022   Little interest or pleasure in doing things Not at all   Feeling down, depressed, irritable, or hopeless Not at all   Total Score PHQ 2 0       Learning Assessment:  Learning Assessment 8/19/2022   PRIMARY LEARNER Patient   CO-LEARNER CAREGIVER -   PRIMARY LANGUAGE ENGLISH   LEARNER PREFERENCE PRIMARY DEMONSTRATION   ANSWERED BY patient   RELATIONSHIP SELF       Abuse Screening:  Abuse Screening Questionnaire 8/19/2022   Do you ever feel afraid of your partner? N   Are you in a relationship with someone who physically or mentally threatens you? N   Is it safe for you to go home? Y       Fall Risk  Fall Risk Assessment, last 12 mths 8/19/2022   Able to walk? Yes   Fall in past 12 months? 0   Do you feel unsteady? 0   Are you worried about falling 0           Pt currently taking Anticoagulant therapy? no    Pt currently taking Antiplatelet therapy ? Aspirin 81 mg daily      Coordination of Care:  1. Have you been to the ER, urgent care clinic since your last visit? Hospitalized since your last visit? no    2. Have you seen or consulted any other health care providers outside of the [address] [place] [address] since your last visit? Include any pap smears or colon screening.  no

## 2022-08-19 NOTE — PROGRESS NOTES
HISTORY OF PRESENT ILLNESS  Jeannie Mejia is a 66 y.o. female. New Patient  Associated symptoms include shortness of breath. Pertinent negatives include no chest pain, no abdominal pain and no headaches. Shortness of Breath  Pertinent negatives include no fever, no headaches, no cough, no wheezing, no PND, no orthopnea, no chest pain, no vomiting, no abdominal pain, no rash, no leg swelling and no claudication. Patient presents for a new office visit. She was referred here by her PCP to establish care with a local cardiologist.  She was previously seeing another cardiologist at [facility]. She has a history of nonobstructive single-vessel coronary disease involving her mid RCA. She last underwent a cardiac catheterization in June 2021 which demonstrated a 50 to 60% mid RCA lesion which was negative for ischemia on IFR. She had widely patent vessels elsewhere. She also has a history of a small distal descending thoracic aortic aneurysm measuring 3.8 x 3.7 cm on CTA in June 2022. Patient was hospitalized at [facility] briefly in June 2022 for chest pain. She was ruled out for myocardial infarction, had nonspecific EKG changes, so underwent noninvasive cardiac testing including an echocardiogram and a nuclear stress test.  Nuclear stress test was a normal and low risk study. Her echocardiogram demonstrated preserved LV function, EF 55 to 60%, mild concentric LVH, no valvular heart disease and upper normal PA pressures. Over the past 2 months, patient has been feeling well. She denies any recurrent chest pain, worsening shortness of breath, no increased leg swelling or claudication. No major change in her activity tolerance. Past Medical History:   Diagnosis Date    Arthritis     Chronic obstructive pulmonary disease ([address]     Coronary artery disease involving native coronary artery of native heart without angina pectoris 06/2021    Nonobstructive CAD. Mid RCA 50-60%, negative IFR.   Left main, LAD, left circumflex patent. Descending aortic aneurysm (HCC)     Fibromyalgia     Hypercholesterolemia     Hypertension     Ill-defined condition     Irregular heart beat     Oxygen dependent     at night [facility]    Sleep apnea     mouth guard, oxygen HS     Current Outpatient Medications   Medication Sig Dispense Refill    tiotropium bromide (Spiriva Respimat) 1.25 mcg/actuation inhaler Take 2 Puffs by inhalation daily. 4 g 0    HYDROcodone-acetaminophen (NORCO) 5-325 mg per tablet Take 1 Tab by mouth every eight (8) hours as needed. cholecalciferol (VITAMIN D3) (5000 Units/125 mcg) tab tablet Take 5,000 Units by mouth daily. cyclobenzaprine (FLEXERIL) 10 mg tablet Take 5 mg by mouth three (3) times daily as needed. magnesium 250 mg tab Take 125 mg by mouth daily. niacin (SLO-NIACIN) 500 mg ER Tablet Take 2 Tabs by mouth nightly. OXYGEN-AIR DELIVERY SYSTEMS 2 L/min by Does Not Apply route nightly. Indications: DME: [facility]      Flaxseed Oil oil 1 Tab by Does Not Apply route daily. NIFEdipine ER (PROCARDIA XL) 30 mg ER tablet Take 30 mg by mouth daily. atenoloL (TENORMIN) 50 mg tablet Take 25 mg by mouth daily. atorvastatin (LIPITOR) 40 mg tablet Take 80 mg by mouth daily. pantoprazole (PROTONIX) 40 mg tablet Take 40 mg by mouth two (2) times a day. zolpidem (AMBIEN) 10 mg tablet Take 5 mg by mouth nightly as needed for Sleep.      levocetirizine (XYZAL) 5 mg tablet Take 5 mg by mouth daily. fluticasone propionate (FLONASE) 50 mcg/actuation nasal spray 2 Sprays by Both Nostrils route two (2) times daily as needed. montelukast (SINGULAIR) 10 mg tablet Take 10 mg by mouth nightly. multivitamin (ONE A DAY) tablet Take 1 Tab by mouth daily. aspirin delayed-release 81 mg tablet Take 81 mg by mouth daily. VITAMIN B COMPLEX (B COMPLETE PO) Take 1 Tab by mouth daily. potassium 99 mg tablet Take 45.5 mg by mouth daily.        Allergies   Allergen Reactions    Benadryl [Diphenhydramine Hcl] Hives    Codeine Itching    Cymbalta [Duloxetine] Other (comments)     Shaking, feeling someone living inside my body, arms going numb. Iodinated Contrast Media Hives     Pt states severe hives     Levaquin [Levofloxacin] Nausea Only    Lyrica [Pregabalin] Other (comments)     Shaking, feeling someone living in my body, numb arm. Any meds for fibromyialga    Other Medication Swelling     Pt wrote having an allergic reaction to \"tetnus\". \"Extreme swelling at site\"     Sulfur Hives      Social History     Tobacco Use    Smoking status: Former     Packs/day: 1.00     Years: 45.00     Pack years: 45.00     Types: Cigarettes     Quit date: 2014     Years since quittin.6    Smokeless tobacco: Never   Vaping Use    Vaping Use: Never used   Substance Use Topics    Alcohol use: No    Drug use: No     Family History   Problem Relation Age of Onset    Hypertension Mother     Diabetes Mother     Cancer Father     Stroke Father          Review of Systems   Constitutional:  Negative for chills, fever and weight loss. HENT:  Negative for nosebleeds. Eyes:  Negative for blurred vision and double vision. Respiratory:  Positive for shortness of breath. Negative for cough and wheezing. Cardiovascular:  Negative for chest pain, palpitations, orthopnea, claudication, leg swelling and PND. Gastrointestinal:  Negative for abdominal pain, heartburn, nausea and vomiting. Genitourinary:  Negative for dysuria and hematuria. Musculoskeletal:  Negative for falls and myalgias. Skin:  Negative for rash. Neurological:  Negative for dizziness, focal weakness and headaches. Endo/Heme/Allergies:  Does not bruise/bleed easily. Psychiatric/Behavioral:  Negative for substance abuse.       Visit Vitals  /88 (BP 1 Location: Left upper arm, BP Patient Position: Sitting, BP Cuff Size: Large adult)   Pulse 70   Ht 5' 2\" (1.575 m)   Wt 83 kg (183 lb)   SpO2 96%   BMI 33.47 kg/m²       Physical Exam  Constitutional:       Appearance: She is well-developed. HENT:      Head: Normocephalic and atraumatic. Eyes:      Conjunctiva/sclera: Conjunctivae normal.   Neck:      Vascular: No carotid bruit or JVD. Cardiovascular:      Rate and Rhythm: Normal rate and regular rhythm. Pulses: Normal pulses. Heart sounds: S1 normal and S2 normal. No murmur heard. No gallop. Pulmonary:      Effort: Pulmonary effort is normal.      Breath sounds: Normal breath sounds. No wheezing or rales. Abdominal:      General: Bowel sounds are normal.      Palpations: Abdomen is soft. Tenderness: There is no abdominal tenderness. Musculoskeletal:         General: No swelling or deformity. Cervical back: Neck supple. Skin:     General: Skin is warm and dry. Neurological:      General: No focal deficit present. Mental Status: She is alert and oriented to person, place, and time. Psychiatric:         Mood and Affect: Mood normal.     EKG: Normal sinus rhythm, leftward axis, borderline voltage criteria for LVH, nonspecific ST abnormality. No change compared to the previous EKGs. ASSESSMENT and PLAN  Encounter Diagnoses   Name Primary? Coronary artery disease involving native coronary artery of native heart without angina pectoris Yes    Hypertension, unspecified type     Hypercholesterolemia     Descending aortic aneurysm (HCC)      Nonobstructive coronary artery disease. Patient last underwent a cardiac catheterization in June 2021 which revealed a mid RCA lesion of 50 to 60% which was nonflow limiting by IFR. She recently underwent a nuclear stress test in June 2022 which was a normal and low risk study. No new symptoms concerning for angina. I would continue her current regimen which includes an aspirin, beta-blocker and potent statin. Hypertension.   Somewhat labile in the past.  She states is now reasonably well controlled on a lower dose of atenolol and nifedipine, both of which I would continue. Dyslipidemia. Patient is currently taking atorvastatin 40 mg daily. This is followed closely by her PCP. From a cardiac standpoint I prefer LDL to be less than 70. Small descending thoracic aortic aneurysm. This was last assessed on CTA in June 2022 and measured 3.8 x 3.7 cm. At this time this can be reevaluated every other year with serial imaging. History of tobacco use. Patient quit smoking approximately 7 years ago. She was congratulated and encouraged to remain tobacco free. Follow-up annually, sooner as needed.

## 2022-08-25 ENCOUNTER — OFFICE VISIT (OUTPATIENT)
Dept: PULMONOLOGY | Age: 72
End: 2022-08-25
Payer: MEDICARE

## 2022-08-25 VITALS
HEIGHT: 62 IN | SYSTOLIC BLOOD PRESSURE: 130 MMHG | OXYGEN SATURATION: 96 % | HEART RATE: 69 BPM | BODY MASS INDEX: 33.68 KG/M2 | DIASTOLIC BLOOD PRESSURE: 72 MMHG | WEIGHT: 183 LBS

## 2022-08-25 DIAGNOSIS — R06.02 SOB (SHORTNESS OF BREATH) ON EXERTION: ICD-10-CM

## 2022-08-25 PROCEDURE — 94060 EVALUATION OF WHEEZING: CPT | Performed by: INTERNAL MEDICINE

## 2022-08-25 PROCEDURE — 94618 PULMONARY STRESS TESTING: CPT | Performed by: INTERNAL MEDICINE

## 2022-08-25 PROCEDURE — 94729 DIFFUSING CAPACITY: CPT | Performed by: INTERNAL MEDICINE

## 2022-08-25 PROCEDURE — 94727 GAS DIL/WSHOT DETER LNG VOL: CPT | Performed by: INTERNAL MEDICINE

## 2022-09-13 ENCOUNTER — TELEPHONE (OUTPATIENT)
Dept: VASCULAR SURGERY | Age: 72
End: 2022-09-13

## 2022-09-13 NOTE — TELEPHONE ENCOUNTER
Discussed with provider and CTA from June is good for her appt in October, pt does not need to repeat CTA per Dr. Blessing Mattson

## 2022-09-13 NOTE — TELEPHONE ENCOUNTER
She is jyoti for CT on 10/24/2022, she had same CT done in June when she was in the hospital. She wants to know if that scan can be used?

## 2022-09-20 ENCOUNTER — OFFICE VISIT (OUTPATIENT)
Dept: PULMONOLOGY | Age: 72
End: 2022-09-20
Payer: MEDICARE

## 2022-09-20 VITALS
WEIGHT: 181 LBS | HEIGHT: 62 IN | DIASTOLIC BLOOD PRESSURE: 59 MMHG | BODY MASS INDEX: 33.31 KG/M2 | TEMPERATURE: 98 F | HEART RATE: 70 BPM | OXYGEN SATURATION: 94 % | RESPIRATION RATE: 18 BRPM | SYSTOLIC BLOOD PRESSURE: 129 MMHG

## 2022-09-20 DIAGNOSIS — G47.34 NOCTURNAL HYPOXEMIA: Primary | ICD-10-CM

## 2022-09-20 DIAGNOSIS — J44.9 BRONCHITIS WITH CHRONIC AIRWAY OBSTRUCTION (HCC): ICD-10-CM

## 2022-09-20 PROCEDURE — G8417 CALC BMI ABV UP PARAM F/U: HCPCS | Performed by: INTERNAL MEDICINE

## 2022-09-20 PROCEDURE — G8752 SYS BP LESS 140: HCPCS | Performed by: INTERNAL MEDICINE

## 2022-09-20 PROCEDURE — 1090F PRES/ABSN URINE INCON ASSESS: CPT | Performed by: INTERNAL MEDICINE

## 2022-09-20 PROCEDURE — G8427 DOCREV CUR MEDS BY ELIG CLIN: HCPCS | Performed by: INTERNAL MEDICINE

## 2022-09-20 PROCEDURE — G8399 PT W/DXA RESULTS DOCUMENT: HCPCS | Performed by: INTERNAL MEDICINE

## 2022-09-20 PROCEDURE — G8432 DEP SCR NOT DOC, RNG: HCPCS | Performed by: INTERNAL MEDICINE

## 2022-09-20 PROCEDURE — 99214 OFFICE O/P EST MOD 30 MIN: CPT | Performed by: INTERNAL MEDICINE

## 2022-09-20 PROCEDURE — 1123F ACP DISCUSS/DSCN MKR DOCD: CPT | Performed by: INTERNAL MEDICINE

## 2022-09-20 PROCEDURE — G8536 NO DOC ELDER MAL SCRN: HCPCS | Performed by: INTERNAL MEDICINE

## 2022-09-20 PROCEDURE — G9899 SCRN MAM PERF RSLTS DOC: HCPCS | Performed by: INTERNAL MEDICINE

## 2022-09-20 PROCEDURE — G8754 DIAS BP LESS 90: HCPCS | Performed by: INTERNAL MEDICINE

## 2022-09-20 PROCEDURE — 1101F PT FALLS ASSESS-DOCD LE1/YR: CPT | Performed by: INTERNAL MEDICINE

## 2022-09-20 PROCEDURE — 3017F COLORECTAL CA SCREEN DOC REV: CPT | Performed by: INTERNAL MEDICINE

## 2022-09-20 NOTE — PROGRESS NOTES
Trinh Gallego presents today for   Chief Complaint   Patient presents with    [address] Follow Up     ED 06/20-06/21-Chest Pain    Results     PFT       Is someone accompanying this pt? No    Is the patient using any DME equipment during OV? Oxygen    -D[employer]    Depression Screening:  3 most recent PHQ Screens 8/19/2022   Little interest or pleasure in doing things Not at all   Feeling down, depressed, irritable, or hopeless Not at all   Total Score PHQ 2 0       Learning Assessment:  Learning Assessment 8/19/2022   PRIMARY LEARNER Patient   CO-LEARNER CAREGIVER -   PRIMARY LANGUAGE ENGLISH   LEARNER PREFERENCE PRIMARY DEMONSTRATION   ANSWERED BY patient   RELATIONSHIP SELF       Abuse Screening:  Abuse Screening Questionnaire 8/19/2022   Do you ever feel afraid of your partner? N   Are you in a relationship with someone who physically or mentally threatens you? N   Is it safe for you to go home? Y       Fall Risk  Fall Risk Assessment, last 12 mths 8/19/2022   Able to walk? Yes   Fall in past 12 months? 0   Do you feel unsteady? 0   Are you worried about falling 0         Coordination of Care:  1. Have you been to the ER, urgent care clinic since your last visit? Hospitalized since your last visit? 06/20-06/21 Chest Pain    2. Have you seen or consulted any other health care providers outside of the [address] [place] [address] since your last visit? Include any pap smears or colon screening.  PCP

## 2022-09-20 NOTE — PROGRESS NOTES
[facility]  Pulmonary, Critical Care, and Sleep Medicine      Pulmonary Office visit. Name: Amarilis Connor     : 1950     Date: 2022        Subjective:     Patient is a 67 y.o. female is here for follow-up after recent diagnosis of COVID and evaluation for progressive shortness of breath  follow up-evaluation for need for nocturnal Oxygen supplementation, evaluation of an abnormal Ct scan of chest.    22     Patient has been complaining of worsening shortness of breath over several months. She states that she did notice some improvement with Spiriva and is now back to using albuterol. She stopped the Spiriva due to cost  Overall feels that she has recovered from [place] and influenza  Still has minimal intermittent residual cough  She had PFTs, 6-minute walk test completed and is here to discuss further    Using night time O2  Had titration study completed- results suggest nocturnal Oxygen desaturation despite CPAP 9 cm. Needs 2 L Oxygen in addition. She had a mandibular advancement device but is not wearing it now after she had tooth work done  On Singulair, flonase  She also has h/o allergies and is receiving allergy immunotherapy by Dr. Abida Larson. HPI:  Followed from 2014 To 10/2014. Most recent follow up Ct scan 2015 with stability in nodule. She had 24 month follow up CT completed and is here to discuss results. She also had follow up Sleep evaluation- Dr. Verna Hrenandez who reports nocturnal hypoxemia on initial study and follow up titration study. Patient was asked to follow up with Pulmonology. She has smoked less than 1 ppd for past 47+ years and eventually quit smoking in   She denies any sleep related problems, leg swelling. Denies joint pains. 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 H/o rheumatic fever in childhood.      Past Medical History:   Diagnosis Date    Arthritis     Chronic obstructive pulmonary disease ([facility] [address] Coronary artery disease involving native coronary artery of native heart without angina pectoris 06/2021    Nonobstructive CAD. Mid RCA 50-60%, negative IFR. Left main, LAD, left circumflex patent. Descending aortic aneurysm (HCC)     Fibromyalgia     Hypercholesterolemia     Hypertension     Ill-defined condition     Irregular heart beat     Oxygen dependent     at night [facility]    Sleep apnea     mouth guard, oxygen HS       Past Surgical History:   Procedure Laterality Date    COLONOSCOPY N/A 7/3/2017    COLONOSCOPY with biopsies performed by Ace Mccarty MD at [facility]    HX APPENDECTOMY      HX BREAST BIOPSY Left     HX CATARACT REMOVAL      HX TUBAL LIGATION       Allergies   Allergen Reactions    Benadryl [Diphenhydramine Hcl] Hives    Codeine Itching    Cymbalta [Duloxetine] Other (comments)     Shaking, feeling someone living inside my body, arms going numb. Iodinated Contrast Media Hives     Pt states severe hives     Levaquin [Levofloxacin] Nausea Only    Lyrica [Pregabalin] Other (comments)     Shaking, feeling someone living in my body, numb arm. Any meds for fibromyialga    Other Medication Swelling     Pt wrote having an allergic reaction to \"tetnus\". \"Extreme swelling at site\"     Sulfur Hives     Current Outpatient Medications   Medication Sig Dispense Refill    tiotropium bromide (Spiriva Respimat) 1.25 mcg/actuation inhaler Take 2 Puffs by inhalation daily. 4 g 0    HYDROcodone-acetaminophen (NORCO) 5-325 mg per tablet Take 1 Tab by mouth every eight (8) hours as needed. cholecalciferol (VITAMIN D3) (5000 Units/125 mcg) tab tablet Take 5,000 Units by mouth daily. cyclobenzaprine (FLEXERIL) 10 mg tablet Take 5 mg by mouth three (3) times daily as needed. magnesium 250 mg tab Take 125 mg by mouth daily. niacin (SLO-NIACIN) 500 mg ER Tablet Take 2 Tabs by mouth nightly. OXYGEN-AIR DELIVERY SYSTEMS 2 L/min by Does Not Apply route nightly.  Indications: DME: [facility]      Flaxseed Oil oil 1 Tab by Does Not Apply route daily. NIFEdipine ER (PROCARDIA XL) 30 mg ER tablet Take 30 mg by mouth daily. atenoloL (TENORMIN) 50 mg tablet Take 25 mg by mouth daily. atorvastatin (LIPITOR) 40 mg tablet Take 80 mg by mouth daily. pantoprazole (PROTONIX) 40 mg tablet Take 40 mg by mouth two (2) times a day. zolpidem (AMBIEN) 10 mg tablet Take 5 mg by mouth nightly as needed for Sleep.      levocetirizine (XYZAL) 5 mg tablet Take 5 mg by mouth daily. fluticasone propionate (FLONASE) 50 mcg/actuation nasal spray 2 Sprays by Both Nostrils route two (2) times daily as needed. montelukast (SINGULAIR) 10 mg tablet Take 10 mg by mouth nightly. multivitamin (ONE A DAY) tablet Take 1 Tab by mouth daily. aspirin delayed-release 81 mg tablet Take 81 mg by mouth daily. VITAMIN B COMPLEX (B COMPLETE PO) Take 1 Tab by mouth daily. potassium 99 mg tablet Take 45.5 mg by mouth daily.        Review of Systems:  HEENT: No epistaxis, no nasal drainage, no difficulty in swallowing, no redness in eyes  Respiratory: as above  Cardiovascular: no chest pain, no palpitations, no chronic leg edema, no syncope  Gastrointestinal: no abd pain, no vomiting, no diarrhea, no bleeding symptoms  Genitourinary: No urinary symptoms or hematuria  Integument/breast: No ulcers or rashes  Musculoskeletal:Neg  Neurological: No focal weakness, no seizures, no headaches  Behvioral/Psych: No anxiety, no depression  Constitutional: No fever, no chills, no weight loss, no night sweats     Objective:     Visit Vitals  BP (!) 129/59 (BP 1 Location: Left upper arm, BP Patient Position: Sitting, BP Cuff Size: Large adult)   Pulse 70   Temp 98 °F (36.7 °C) (Temporal)   Resp 18   Ht 5' 2\" (1.575 m)   Wt 82.1 kg (181 lb)   SpO2 94% Comment: RA Rest   BMI 33.11 kg/m²        Physical Exam:   General: comfortable, no acute distress  HEENT: pupils reactive, sclera anicteric, EOM intact  Neck: No adenopathy or thyroid swelling, no lymphadenopathy or JVD, supple  CVS: S1S2 no murmurs  RS: Mod AE bilaterally, no tactile fremitus or egophony, no accessory muscle use  Abd: soft, non tender, no hepatosplenomegaly  Neuro: non focal, awake, alert  Extrm: no leg edema, clubbing or cyanosis  Skin: no rash    Data review:   PFT's:  PFT 8/25/2022  Maximal Mid Expiratory Flow rate is reduced to 62 % predicted   FEV 1% is reduced   DLCO 50% predicted  Moderately reduced PI max and PE max  Mild obstruction with reduced area of alveolar gas exchange. PFT-2014  Flows:  Maximal Mid Expiratory Flow rate is reduced to 48 % predicted  Forced Expiratory Volume in one second is normal  FEV 1% is reduced  Volumes:  Normal Volumes  Flow Volume Loop:  Reduced Terminal Flows in the Flow Volume Loop  Bronchodilator:  Significant improvement with bronchodilator  Diffusion:  Low Normal Diffusion Capacity  Impression:  Mild to Moderate obstructive defect, predominately small airways    Imaging:  I have personally reviewed the patients radiographs and have reviewed the reports:  CT Results (most recent):  Results from Hospital Encounter encounter on 06/19/22    CTA CHEST ABD PELV W WO CONT    Narrative  CTA Chest, Abdomen And Pelvis With and without Enhancement    INDICATION: Sharp left-sided Chest pain radiating to shoulder and left neck,  hypertension, history of thoracic aortic aneurysm. TECHNIQUE: 2.5 mm collimation axial images obtained from the thoracic inlet to  the level of the pubic symphysis prior to and following the uneventful  administration of intravenous contrast.  Imaging performed during maximum aortic  enhancement and is therefore suboptimal for evaluating solid organs and bowel. Raw data reviewed along with three-dimensional volume rendered images and  maximum intensity projection images to better evaluate the tortuous vascular  structures.     All CT scans at this facility are performed using dose optimization technique as  appropriate to a performed exam, to include automated exposure control,  adjustment of the mA and/or kV according to patient size (including appropriate  matching first site-specific examinations), or use of iterative reconstruction  technique. COMPARISON: 10/27/2021. Thyroid: Unremarkable. Heart: No effusion. Questionable biatrial cardiac chamber enlargement. Vessels: Negative for pulmonary emboli. Lymph Nodes: Unremarkable. Lung: Mild emphysema. Mild regions of subsegmental atelectasis or scarring. Pleura: Unremarkable. ABDOMEN FINDINGS:  There is suboptimal evaluation of visceral organs secondary to timing of the  exam.      Liver: Unremarkable. Spleen: Unremarkable. Pancreas: Unremarkable. Biliary: Unremarkable. Bowel: Mild diverticulosis. Peritoneum/ Retroperitoneum: Unremarkable. Lymph Nodes: Unremarkable. Adrenal Glands: Unremarkable. Kidneys: Unremarkable. PELVIS FINDINGS:    Bladder/ Pelvic Organs: Unremarkable. Bones/Soft tissues: Lumbar facet hypertrophy and arthropathy. Osteopenia. Narrowing bilateral hip joint spaces. Degenerative disc disease. AORTA FINDINGS:  Ascending aorta is normal in caliber. 3-D generated measurements are given  below. Mild aortic atherosclerosis in the chest. No acute aortic pathology. Bovine arch anatomy with patent arch vessels. 3.4, 3.2, 2.7 centimeter at the sinus of Valsalva. 3.1 x 2.8 centimeter at the sinotubular junction. 3.3 x 3.2 centimeter at the maximum diameter of the ascending aorta. 3.1 x 2.9 centimeter at the mid aortic arch. 3.2 x 2.8 centimeter at the proximal descending thoracic aorta. 2.6 x 2.4 centimeter at the mid descending aorta. 3.8 x 3.7 cm at the distal descending thoracic aorta at the level of the  diaphragmatic hiatus. Infrarenal aorta measures up to 3 x 3 cm. More moderate atherosclerosis in the abdomen and pelvis. Mild celiac stenosis. Moderate SMA stenosis. OSMAN is diminutive. There are 2 left renal arteries which  are patent. 2 right renal arteries are patent. Patent external and femoral  branches. Regions of stenosis in the right greater than left internal iliac  arteries. Impression  1. No acute aortic pathology. 2.  Descending thoracic aorta measures up to 3.8 cm at the diaphragmatic hiatus. 3.  Infrarenal aorta measures up to 3 cm. 4.  Moderate atherosclerosis in the abdomen and pelvis. Mild celiac and moderate  SMA stenosis. Regions of stenosis internal iliac arteries. 5.  Questionable biatrial cardiac chamber enlargement. 6.  Mild emphysema. 7.  Mild diverticulosis. Preliminary report provided by Dr. Juan Francisco Bee on 6/20/2022 at approximately 0500  hours. CT scan 10/2017:  6 mm right upper lobe nodule against the minor fissure is stable over the  last 3 years, benign    CT scan of chest ( [facility]-) 5/2016. LUNGS:  Nodules:  -5 mm perifissural nodule along the right minor fissure (image 131), solid circumscribed, likely an intrapulmonary lymph node. -5 mm groundglass nodule right lung apex (image 56). Other pulmonary findings:  Patchy coarse reticular opacification in the mid and lower lungs, scarring and/or subsegmental atelectasis. Few regions of mild cylindrical bronchiectasis. OTHER:   No significant incidental findings. CT scan 7/2015:  Stable pulmonary nodule along the minor fissure and stable borderline  precarinal mediastinal lymph node since 9/2014. Ct scan of chest:6/2014:C  There is a 0.5 x 0.4 cm noncalcified ovoid density in the right upper lobe   inferior aspect abutting the minor fissure (image #32). No dominant lung mass   is detected. Subsegmental atelectatic changes are identified in the right   middle lobe and the right lower lobe. Less pronounced subtle subsegmental   atelectatic changes also identified in the lingular region and left lung base. No focal infiltrate or consolidation is observed.   Ct scan of abd/chest- 9/2014;  1. Stable right pulmonary nodule. 2. The descending thoracic aortic aneurysm is stable. Ectasia of the ascending   aorta is stable. 3. No evidence for bowel obstruction or inflammation. Inspissated stool in the   descending colon could be the result of constipation. 4. 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 2-D echo:  Left ventricle: Size was normal. Systolic function was normal by EF  (biplane method of disks). Ejection fraction was estimated to be 55 %. There were no regional wall motion abnormalities. Wall thickness was  normal. Doppler parameters were consistent with abnormal left ventricular  relaxation (grade 1 diastolic dysfunction). Right ventricle: Systolic pressure was mildly increased. Estimated peak  pressure was 35 mmHg. Left atrium: The atrium was mildly dilated. Mitral valve: There was mild annular calcification. Tricuspid valve: There was trace to mild regurgitation. Pulmonary arteries: Systolic pressure was mildly increased. IMPRESSION:     Shortness of breath secondary to asthma and obstructive bronchitis also with some radiologic findings consistent with chronic atelectasis. PFTs with mild obstruction but diffusion capacity reduced to 50% predicted explains extent of dyspnea and hypoxemia  S/p COVID-likely also had concomitant flu given the extent of her symptoms. Recovered  Nocturnal hypoxemia-most recent overnight oximetry continues to show nocturnal oxygen desaturation needing supplemental oxygen  Lung nodule- incidental finding of 0.5x0.4 cm RUL nodule in a patient who has been a smoker 52 PPD. High risk for malignancy and needs appropriate follow up. 24 month stability demonstrated. Discussed report with patient. Need to follow up annually per USTFP recommendations- low dose lung CT screening protocol or can accept Ct done for evaluation of Aneurysm. 6 mm right upper lobe nodule against the minor fissure -stable  Mild pulmonary HTN   Allergic rhinitis -on Flonase and allergy injections  Esophageal stricture- s/p dilatation  And h/o hiatal hernia ? GERD with silent aspiration as cause for subtle basal interstitial fibrotic changes  HTN  Fusiform aneurysm of the distal descending aorta- vascular surgery following  hyperlipidemia      RECOMMENDATIONS:   Discussed results of PFTs with patient-continue as needed albuterol and if remains symptomatic with resume Spiriva Respimat  Instructed patient to practice breathing exercises to recruit alveoli-instructed and demonstrated techniques  Continue Nocturnal oxygen supplementation based on documented Oxygen desaturation during sleep despite LESLIE corrective devices including CPAP. Qualifying study completed on 6/3/2020-I have provided her with a face tent to use in the postop period To avoid contact with the nose  This patient has hypoxia related clinical diagnosis that will improve with oxygen therapy. Other treatment measures have been tried and have been ineffective.   Continue prn albuterol  Antireflux therapy and continued interventions- dietary and medications  Maintain smoking cessation- congratulated on success  Preventive vaccinations  Will follow up in 6 months or sooner should there be any change  Questions and concerns addressed          Raven Lewis MD

## 2022-09-20 NOTE — LETTER
9/20/2022    Patient: Garrison Lai   YOB: 1950   Date of Visit: 9/20/2022     Denisha Lora MD  [address] [address]  Via Fax: [phone]    Dear Denisha Lora MD,      Thank you for referring Ms. Soco Doev to [address] [place] [address] for evaluation. My notes for this consultation are attached. If you have questions, please do not hesitate to call me. I look forward to following your patient along with you.       Sincerely,    Lary Danielle MD

## 2022-10-28 ENCOUNTER — HOSPITAL ENCOUNTER (OUTPATIENT)
Dept: ULTRASOUND IMAGING | Age: 72
Discharge: HOME | End: 2022-10-28
Attending: INTERNAL MEDICINE
Payer: MEDICARE

## 2022-10-28 ENCOUNTER — HOSPITAL ENCOUNTER (OUTPATIENT)
Dept: LAB | Age: 72
Discharge: HOME | End: 2022-10-28
Payer: MEDICARE

## 2022-10-28 DIAGNOSIS — N18.32 STAGE 3B CHRONIC KIDNEY DISEASE (HCC): ICD-10-CM

## 2022-10-28 LAB
25(OH)D3 SERPL-MCNC: 63.2 NG/ML (ref 30–100)
ALBUMIN SERPL-MCNC: 4.5 G/DL (ref 3.4–5)
ANION GAP SERPL CALC-SCNC: 6 MMOL/L (ref 3–18)
APPEARANCE UR: CLEAR
BACTERIA URNS QL MICRO: NEGATIVE /HPF
BILIRUB UR QL: NEGATIVE
BUN SERPL-MCNC: 12 MG/DL (ref 7–18)
BUN/CREAT SERPL: 13 (ref 12–20)
CALCIUM SERPL-MCNC: 9 MG/DL (ref 8.5–10.1)
CALCIUM SERPL-MCNC: 9.7 MG/DL (ref 8.5–10.1)
CHLORIDE SERPL-SCNC: 103 MMOL/L (ref 100–111)
CO2 SERPL-SCNC: 31 MMOL/L (ref 21–32)
COLOR UR: YELLOW
CREAT SERPL-MCNC: 0.95 MG/DL (ref 0.6–1.3)
CREAT UR-MCNC: <13 MG/DL (ref 30–125)
EPITH CASTS URNS QL MICRO: NORMAL /LPF (ref 0–5)
ERYTHROCYTE [DISTWIDTH] IN BLOOD BY AUTOMATED COUNT: 14.2 % (ref 11.6–14.5)
GLUCOSE SERPL-MCNC: 107 MG/DL (ref 74–99)
GLUCOSE UR STRIP.AUTO-MCNC: NEGATIVE MG/DL
HCT VFR BLD AUTO: 40.8 % (ref 35–45)
HGB BLD-MCNC: 13.2 G/DL (ref 12–16)
HGB UR QL STRIP: NEGATIVE
KETONES UR QL STRIP.AUTO: NEGATIVE MG/DL
LEUKOCYTE ESTERASE UR QL STRIP.AUTO: NEGATIVE
MCH RBC QN AUTO: 31 PG (ref 24–34)
MCHC RBC AUTO-ENTMCNC: 32.4 G/DL (ref 31–37)
MCV RBC AUTO: 95.8 FL (ref 78–100)
NITRITE UR QL STRIP.AUTO: NEGATIVE
NRBC # BLD: 0 K/UL (ref 0–0.01)
NRBC BLD-RTO: 0 PER 100 WBC
PH UR STRIP: 7 [PH] (ref 5–8)
PHOSPHATE SERPL-MCNC: 3.4 MG/DL (ref 2.5–4.9)
PLATELET # BLD AUTO: 223 K/UL (ref 135–420)
PMV BLD AUTO: 11.3 FL (ref 9.2–11.8)
POTASSIUM SERPL-SCNC: 3.9 MMOL/L (ref 3.5–5.5)
PROT UR STRIP-MCNC: NEGATIVE MG/DL
PROT UR-MCNC: <5 MG/DL
PROT/CREAT UR-RTO: ABNORMAL
PTH-INTACT SERPL-MCNC: 57.4 PG/ML (ref 18.4–88)
RBC # BLD AUTO: 4.26 M/UL (ref 4.2–5.3)
RBC #/AREA URNS HPF: NORMAL /HPF (ref 0–5)
SODIUM SERPL-SCNC: 140 MMOL/L (ref 136–145)
SP GR UR REFRACTOMETRY: <1.005 (ref 1–1.03)
UROBILINOGEN UR QL STRIP.AUTO: 0.2 EU/DL (ref 0.2–1)
WBC # BLD AUTO: 6.4 K/UL (ref 4.6–13.2)
WBC URNS QL MICRO: NORMAL /HPF (ref 0–4)

## 2022-10-28 PROCEDURE — 84156 ASSAY OF PROTEIN URINE: CPT

## 2022-10-28 PROCEDURE — 85027 COMPLETE CBC AUTOMATED: CPT

## 2022-10-28 PROCEDURE — 81001 URINALYSIS AUTO W/SCOPE: CPT

## 2022-10-28 PROCEDURE — 83970 ASSAY OF PARATHORMONE: CPT

## 2022-10-28 PROCEDURE — 36415 COLL VENOUS BLD VENIPUNCTURE: CPT

## 2022-10-28 PROCEDURE — 82306 VITAMIN D 25 HYDROXY: CPT

## 2022-10-28 PROCEDURE — 80069 RENAL FUNCTION PANEL: CPT

## 2022-10-28 PROCEDURE — 76770 US EXAM ABDO BACK WALL COMP: CPT

## 2022-10-31 ENCOUNTER — OFFICE VISIT (OUTPATIENT)
Dept: VASCULAR SURGERY | Age: 72
End: 2022-10-31
Payer: MEDICARE

## 2022-10-31 VITALS
DIASTOLIC BLOOD PRESSURE: 80 MMHG | HEIGHT: 62 IN | WEIGHT: 181 LBS | OXYGEN SATURATION: 94 % | BODY MASS INDEX: 33.31 KG/M2 | SYSTOLIC BLOOD PRESSURE: 138 MMHG | HEART RATE: 69 BPM

## 2022-10-31 DIAGNOSIS — I71.23 ANEURYSM OF DESCENDING THORACIC AORTA WITHOUT RUPTURE: Primary | ICD-10-CM

## 2022-10-31 PROCEDURE — G8417 CALC BMI ABV UP PARAM F/U: HCPCS | Performed by: PHYSICIAN ASSISTANT

## 2022-10-31 PROCEDURE — G8399 PT W/DXA RESULTS DOCUMENT: HCPCS | Performed by: PHYSICIAN ASSISTANT

## 2022-10-31 PROCEDURE — 1123F ACP DISCUSS/DSCN MKR DOCD: CPT | Performed by: PHYSICIAN ASSISTANT

## 2022-10-31 PROCEDURE — G9899 SCRN MAM PERF RSLTS DOC: HCPCS | Performed by: PHYSICIAN ASSISTANT

## 2022-10-31 PROCEDURE — 3074F SYST BP LT 130 MM HG: CPT | Performed by: PHYSICIAN ASSISTANT

## 2022-10-31 PROCEDURE — G8754 DIAS BP LESS 90: HCPCS | Performed by: PHYSICIAN ASSISTANT

## 2022-10-31 PROCEDURE — 3078F DIAST BP <80 MM HG: CPT | Performed by: PHYSICIAN ASSISTANT

## 2022-10-31 PROCEDURE — G8427 DOCREV CUR MEDS BY ELIG CLIN: HCPCS | Performed by: PHYSICIAN ASSISTANT

## 2022-10-31 PROCEDURE — 1101F PT FALLS ASSESS-DOCD LE1/YR: CPT | Performed by: PHYSICIAN ASSISTANT

## 2022-10-31 PROCEDURE — G8536 NO DOC ELDER MAL SCRN: HCPCS | Performed by: PHYSICIAN ASSISTANT

## 2022-10-31 PROCEDURE — 1090F PRES/ABSN URINE INCON ASSESS: CPT | Performed by: PHYSICIAN ASSISTANT

## 2022-10-31 PROCEDURE — 99214 OFFICE O/P EST MOD 30 MIN: CPT | Performed by: PHYSICIAN ASSISTANT

## 2022-10-31 PROCEDURE — G8432 DEP SCR NOT DOC, RNG: HCPCS | Performed by: PHYSICIAN ASSISTANT

## 2022-10-31 PROCEDURE — 3017F COLORECTAL CA SCREEN DOC REV: CPT | Performed by: PHYSICIAN ASSISTANT

## 2022-10-31 PROCEDURE — G8752 SYS BP LESS 140: HCPCS | Performed by: PHYSICIAN ASSISTANT

## 2022-10-31 NOTE — PROGRESS NOTES
1. Have you been to the ER, urgent care clinic since your last visit? No      Hospitalized since your last visit? No     2. Have you seen or consulted any other health care providers outside of the [address] [place] [address] since your last visit? Include any pap smears or colon screening.   No

## 2022-11-02 NOTE — PROGRESS NOTES
Autumn Broderick  Chief Complaint   Patient presents with    Follow-up     1 year follow up CT        History and Physical    65 y/o F presents for for yearly follow-up  of a known descending thoracic aortic aneurysm. She denies back pain or chest pain. She underwent repeat surveillance imaging and returns today for results review. Patient states that she has been getting yearly CT scans for evaluation, and also receiving yearly CT scans from her pulmonary doctor. Patient is hoping that she could coordinate obtaining the chest and abdomen CT scans in 1 setting. Patient instructed that we will try to coordinate this with her pulmonologist office. Appreciate her input on this. All in all patient states that she remains very active and continues to be able to accomplish her ADLs and her chores without any issues. She continues to take her daily meds as recommended. Tolerating her diet with no nausea or vomiting. Denies any bowel or bladder issues. Continues to ambulate daily without any leg pain or discomfort. Past Medical History:   Diagnosis Date    Arthritis     Chronic obstructive pulmonary disease ([facility] [address]     Coronary artery disease involving native coronary artery of native heart without angina pectoris 06/2021    Nonobstructive CAD. Mid RCA 50-60%, negative IFR. Left main, LAD, left circumflex patent.     Descending aortic aneurysm (HCC)     Fibromyalgia     Hypercholesterolemia     Hypertension     Ill-defined condition     Irregular heart beat     Oxygen dependent     at night [facility]    Sleep apnea     mouth guard, oxygen HS     Patient Active Problem List   Diagnosis Code    Descending aortic aneurysm (HCC) I71.9    Hypertension I10    Hypercholesterolemia E78.00    Arthritis M19.90    Incidental lung nodule, > 3mm and < 8mm R91.1    Bronchitis with chronic airway obstruction (HCC) J44.9    Cervical disc disease M50.90    Osteoarthritis of spine with radiculopathy, lumbar region M47.26 Neuropathy G62.9    Fibromyalgia M79.7    Spondylosis of lumbar region without myelopathy or radiculopathy M47.816    Lumbar degenerative disc disease M51.36    Chronic pain syndrome G89.4    Spondylosis of cervical region without myelopathy or radiculopathy M47.812    Cervical facet syndrome M47.812    Degenerative disc disease, cervical M50.30    Nocturnal hypoxemia G47.34    Chest pain R07.9    Coronary artery disease involving native coronary artery of native heart without angina pectoris I25.10     Past Surgical History:   Procedure Laterality Date    COLONOSCOPY N/A 7/3/2017    COLONOSCOPY with biopsies performed by Susu Vega MD at [facility] ENDOSCOPY    HX APPENDECTOMY      HX BREAST BIOPSY Left     HX CATARACT REMOVAL      HX TUBAL LIGATION       Current Outpatient Medications   Medication Sig Dispense Refill    tiotropium bromide (Spiriva Respimat) 1.25 mcg/actuation inhaler Take 2 Puffs by inhalation daily. 4 g 0    HYDROcodone-acetaminophen (NORCO) 5-325 mg per tablet Take 1 Tab by mouth every eight (8) hours as needed. cholecalciferol (VITAMIN D3) (5000 Units/125 mcg) tab tablet Take 5,000 Units by mouth daily. cyclobenzaprine (FLEXERIL) 10 mg tablet Take 5 mg by mouth three (3) times daily as needed. magnesium 250 mg tab Take 125 mg by mouth daily. niacin (SLO-NIACIN) 500 mg ER Tablet Take 2 Tabs by mouth nightly. OXYGEN-AIR DELIVERY SYSTEMS 2 L/min by Does Not Apply route nightly. Indications: DME: [facility]      Flaxseed Oil oil 1 Tab by Does Not Apply route daily. NIFEdipine ER (PROCARDIA XL) 30 mg ER tablet Take 30 mg by mouth daily. atorvastatin (LIPITOR) 40 mg tablet Take 80 mg by mouth daily. pantoprazole (PROTONIX) 40 mg tablet Take 40 mg by mouth two (2) times a day. zolpidem (AMBIEN) 10 mg tablet Take 5 mg by mouth nightly as needed for Sleep.      levocetirizine (XYZAL) 5 mg tablet Take 5 mg by mouth daily.       fluticasone propionate (FLONASE) 50 mcg/actuation nasal spray 2 Sprays by Both Nostrils route two (2) times daily as needed. montelukast (SINGULAIR) 10 mg tablet Take 10 mg by mouth nightly. multivitamin (ONE A DAY) tablet Take 1 Tab by mouth daily. aspirin delayed-release 81 mg tablet Take 81 mg by mouth daily. VITAMIN B COMPLEX (B COMPLETE PO) Take 1 Tab by mouth daily. potassium 99 mg tablet Take 45.5 mg by mouth daily. atenoloL (TENORMIN) 50 mg tablet Take 25 mg by mouth daily. Allergies   Allergen Reactions    Benadryl [Diphenhydramine Hcl] Hives    Codeine Itching    Cymbalta [Duloxetine] Other (comments)     Shaking, feeling someone living inside my body, arms going numb. Iodinated Contrast Media Hives     Pt states severe hives     Levaquin [Levofloxacin] Nausea Only    Lyrica [Pregabalin] Other (comments)     Shaking, feeling someone living in my body, numb arm. Any meds for fibromyialga    Other Medication Swelling     Pt wrote having an allergic reaction to \"tetnus\".  \"Extreme swelling at site\"     Sulfur Hives        Social History     Tobacco Use    Smoking status: Former     Packs/day: 1.00     Years: 45.00     Pack years: 45.00     Types: Cigarettes     Quit date: 2014     Years since quittin.8    Smokeless tobacco: Never   Vaping Use    Vaping Use: Never used   Substance Use Topics    Alcohol use: No    Drug use: No        Family History   Problem Relation Age of Onset    Hypertension Mother     Diabetes Mother     Cancer Father     Stroke Father        Review of Systems    General: negative for fever   Eyes: negative for vision loss   HENT: negative for cold symptoms   Respiratory negative for shortness of breath   Cardiac: negative for chest pain   Vascular negative for foot pain at night    Gastrointestinal: negative for abdominal pain   Genitourinary: negative for dysuria    Endocrine: negative for excessive thirst   Skin: negative for rash   Neurological: negative for paralysis Psychiatric: negative for depression        Physical Exam:    Visit Vitals  /80 (BP 1 Location: Left arm, BP Patient Position: Sitting)   Pulse 69   Ht 5' 2\" (1.575 m)   Wt 181 lb (82.1 kg)   SpO2 94%   BMI 33.10 kg/m²        Constitutional:  Patient is well developed, well nourished, and not distressed. HEENT: atraumatic, normocephalic, wearing a mask. Eyes:   Cunjunctivae clear, no scleral icterus  Cardiovascular:  Normal rate, regular rhythm, normal heart sounds. Pulmonary/Chest: Effort normal and breath sounds normal.  she has no wheezes or no rales. Abdominal:   Soft. No tenderness to palpation. Extremities: No edema. Neurological:  she  is alert and oriented x3 Gait normal. Motor & sensory grossly intact in all 4 limbs. Psych: Appropriate mood and affect. Imaging/Studies:  CT ch/a/p (noncon): descending thoracic aortic aneurysm measuring 3.8 cm at the diaphragm. Abdominal aorta measures approx 3 cm. Ascending aorta at 3.5. Impression:  -Clinically doing well with essentially stable descending TAA and AAA. Previsit imaging reviewed, results discussed with patient. Narrative & Impression   CTA Chest, Abdomen And Pelvis With and without Enhancement     INDICATION: Sharp left-sided Chest pain radiating to shoulder and left neck,  hypertension, history of thoracic aortic aneurysm. TECHNIQUE: 2.5 mm collimation axial images obtained from the thoracic inlet to  the level of the pubic symphysis prior to and following the uneventful  administration of intravenous contrast.  Imaging performed during maximum aortic  enhancement and is therefore suboptimal for evaluating solid organs and bowel. Raw data reviewed along with three-dimensional volume rendered images and  maximum intensity projection images to better evaluate the tortuous vascular  structures.      All CT scans at this facility are performed using dose optimization technique as  appropriate to a performed exam, to include automated exposure control,  adjustment of the mA and/or kV according to patient size (including appropriate  matching first site-specific examinations), or use of iterative reconstruction  technique. COMPARISON: 10/27/2021. Thyroid: Unremarkable. Heart: No effusion. Questionable biatrial cardiac chamber enlargement. Vessels: Negative for pulmonary emboli. Lymph Nodes: Unremarkable. Lung: Mild emphysema. Mild regions of subsegmental atelectasis or scarring. Pleura: Unremarkable. ABDOMEN FINDINGS:   There is suboptimal evaluation of visceral organs secondary to timing of the  exam.        Liver: Unremarkable. Spleen: Unremarkable. Pancreas: Unremarkable. Biliary: Unremarkable. Bowel: Mild diverticulosis. Peritoneum/ Retroperitoneum: Unremarkable. Lymph Nodes: Unremarkable. Adrenal Glands: Unremarkable. Kidneys: Unremarkable. PELVIS FINDINGS:      Bladder/ Pelvic Organs: Unremarkable. Bones/Soft tissues: Lumbar facet hypertrophy and arthropathy. Osteopenia. Narrowing bilateral hip joint spaces. Degenerative disc disease. AORTA FINDINGS:  Ascending aorta is normal in caliber. 3-D generated measurements are given  below. Mild aortic atherosclerosis in the chest. No acute aortic pathology. Bovine arch anatomy with patent arch vessels. 3.4, 3.2, 2.7 centimeter at the sinus of Valsalva. 3.1 x 2.8 centimeter at the sinotubular junction. 3.3 x 3.2 centimeter at the maximum diameter of the ascending aorta. 3.1 x 2.9 centimeter at the mid aortic arch. 3.2 x 2.8 centimeter at the proximal descending thoracic aorta. 2.6 x 2.4 centimeter at the mid descending aorta. 3.8 x 3.7 cm at the distal descending thoracic aorta at the level of the  diaphragmatic hiatus. Infrarenal aorta measures up to 3 x 3 cm. More moderate atherosclerosis in the abdomen and pelvis. Mild celiac stenosis. Moderate SMA stenosis. OSMAN is diminutive.  There are 2 left renal arteries which  are patent. 2 right renal arteries are patent. Patent external and femoral  branches. Regions of stenosis in the right greater than left internal iliac  arteries. IMPRESSION  1. No acute aortic pathology. 2.  Descending thoracic aorta measures up to 3.8 cm at the diaphragmatic hiatus. 3.  Infrarenal aorta measures up to 3 cm. 4.  Moderate atherosclerosis in the abdomen and pelvis. Mild celiac and moderate  SMA stenosis. Regions of stenosis internal iliac arteries. 5.  Questionable biatrial cardiac chamber enlargement. 6.  Mild emphysema. 7.  Mild diverticulosis. Preliminary report provided by Dr. Beba Arevalo on 6/20/2022 at approximately 0500  hours.     -Small lung nodule of the LLL - radiology recommended annual f/u. Plan:  Repeat surveillance with noncon CT chest/a/p in 1 year (ordered)  F/u in clinic in 1 year  Patient encouraged to continue to make better lifestyle choices. Better nutritional choices. Increasing her daily activity as tolerated. And of course smoking cessation. Patient states she understands more than willing to proceed as planned. I will discuss details with my attending.       Moshe Ca MD  Vascular Surgeon

## 2022-11-04 DIAGNOSIS — I71.20 THORACIC AORTIC ANEURYSM WITHOUT RUPTURE: ICD-10-CM

## 2022-12-22 RX ORDER — NITROGLYCERIN 0.4 MG/1
0.4 TABLET SUBLINGUAL
COMMUNITY

## 2022-12-22 NOTE — PERIOP NOTES
PRE-SURGICAL INSTRUCTIONS        Patient's Name:  Kristin Bill      PBEAG'T Date:  12/22/2022            Covid Testing Date and Time:    Surgery Date:  1/4/2023                Do NOT eat or drink anything, including candy, gum, or ice chips after midnight on 1/3/2023, unless you have specific instructions from your surgeon or anesthesia provider to do so. You may brush your teeth before coming to the hospital.  No smoking 24 hours prior to the day of surgery. No alcohol 24 hours prior to the day of surgery. No recreational drugs for one week prior to the day of surgery. Leave all valuables, including money/purse, at home. Remove all jewelry, nail polish, acrylic nails, and makeup (including mascara); no lotions powders, deodorant, or perfume/cologne/after shave on the skin. Follow instruction for Hibiclens washes and CHG wipes from surgeon's office. Glasses/contact lenses and dentures may be worn to the hospital.  They will be removed prior to surgery. Call your doctor if symptoms of a cold or illness develop within 24-48 hours prior to your surgery. 11.  If you are having an outpatient procedure, please make arrangements for a responsible ADULT TO [address] [place] [address] and stay with you for 24 hours after your surgery. 12. ONE VISITOR in the hospital at this time for outpatient procedures. Exceptions may be made for surgical admissions, per nursing unit guidelines      Special Instructions:      Bring list of CURRENT medications. Bring inhaler. Bring CPAP machine. Bring any pertinent legal medical records. Take these medications the morning of surgery with a sip of water:  BP meds  Follow physician instructions about insulin. Follow physician instructions about stopping anticoagulants. Complete bowel prep per MD instructions. On the day of surgery, come in the main entrance of [facility]. Let the  at the desk know you are there for surgery.   A staff member will come escort you to the surgical area on the second floor. If you have any questions or concerns, please do not hesitate to call:     (Prior to the day of surgery) PAT department:  [phone]   (Day of surgery) Pre-Op department:  [phone]    These surgical instructions were reviewed with patient during the PAT phone call.

## 2023-01-03 ENCOUNTER — ANESTHESIA EVENT (OUTPATIENT)
Dept: ENDOSCOPY | Age: 73
End: 2023-01-03
Payer: MEDICARE

## 2023-01-04 ENCOUNTER — HOSPITAL ENCOUNTER (OUTPATIENT)
Age: 73
Setting detail: OUTPATIENT SURGERY
Discharge: HOME | End: 2023-01-04
Attending: INTERNAL MEDICINE | Admitting: INTERNAL MEDICINE
Payer: MEDICARE

## 2023-01-04 ENCOUNTER — ANESTHESIA (OUTPATIENT)
Dept: ENDOSCOPY | Age: 73
End: 2023-01-04
Payer: MEDICARE

## 2023-01-04 VITALS
TEMPERATURE: 97.2 F | HEART RATE: 49 BPM | RESPIRATION RATE: 11 BRPM | DIASTOLIC BLOOD PRESSURE: 54 MMHG | SYSTOLIC BLOOD PRESSURE: 134 MMHG | HEIGHT: 62 IN | OXYGEN SATURATION: 95 % | WEIGHT: 179 LBS | BODY MASS INDEX: 32.94 KG/M2

## 2023-01-04 PROCEDURE — 74011250636 HC RX REV CODE- 250/636: Performed by: NURSE ANESTHETIST, CERTIFIED REGISTERED

## 2023-01-04 PROCEDURE — 2709999900 HC NON-CHARGEABLE SUPPLY: Performed by: INTERNAL MEDICINE

## 2023-01-04 PROCEDURE — 77030008565 HC TBNG SUC IRR ERBE -B: Performed by: INTERNAL MEDICINE

## 2023-01-04 PROCEDURE — 77030021593 HC FCPS BIOP ENDOSC BSC -A: Performed by: INTERNAL MEDICINE

## 2023-01-04 PROCEDURE — 76060000031 HC ANESTHESIA FIRST 0.5 HR: Performed by: INTERNAL MEDICINE

## 2023-01-04 PROCEDURE — 74011000250 HC RX REV CODE- 250: Performed by: NURSE ANESTHETIST, CERTIFIED REGISTERED

## 2023-01-04 PROCEDURE — 76040000019: Performed by: INTERNAL MEDICINE

## 2023-01-04 PROCEDURE — 88305 TISSUE EXAM BY PATHOLOGIST: CPT

## 2023-01-04 RX ORDER — ONDANSETRON 2 MG/ML
4 INJECTION INTRAMUSCULAR; INTRAVENOUS ONCE
Status: CANCELLED | OUTPATIENT
Start: 2023-01-04 | End: 2023-01-04

## 2023-01-04 RX ORDER — SODIUM CHLORIDE 0.9 % (FLUSH) 0.9 %
5-40 SYRINGE (ML) INJECTION EVERY 8 HOURS
Status: CANCELLED | OUTPATIENT
Start: 2023-01-04

## 2023-01-04 RX ORDER — SODIUM CHLORIDE 0.9 % (FLUSH) 0.9 %
5-40 SYRINGE (ML) INJECTION EVERY 8 HOURS
Status: DISCONTINUED | OUTPATIENT
Start: 2023-01-04 | End: 2023-01-04 | Stop reason: HOSPADM

## 2023-01-04 RX ORDER — PROPOFOL 10 MG/ML
INJECTION, EMULSION INTRAVENOUS AS NEEDED
Status: DISCONTINUED | OUTPATIENT
Start: 2023-01-04 | End: 2023-01-04 | Stop reason: HOSPADM

## 2023-01-04 RX ORDER — SODIUM CHLORIDE, SODIUM LACTATE, POTASSIUM CHLORIDE, CALCIUM CHLORIDE 600; 310; 30; 20 MG/100ML; MG/100ML; MG/100ML; MG/100ML
75 INJECTION, SOLUTION INTRAVENOUS CONTINUOUS
Status: DISCONTINUED | OUTPATIENT
Start: 2023-01-04 | End: 2023-01-04 | Stop reason: HOSPADM

## 2023-01-04 RX ORDER — SODIUM CHLORIDE 0.9 % (FLUSH) 0.9 %
5-40 SYRINGE (ML) INJECTION AS NEEDED
Status: DISCONTINUED | OUTPATIENT
Start: 2023-01-04 | End: 2023-01-04 | Stop reason: HOSPADM

## 2023-01-04 RX ORDER — SODIUM CHLORIDE 0.9 % (FLUSH) 0.9 %
5-40 SYRINGE (ML) INJECTION AS NEEDED
Status: CANCELLED | OUTPATIENT
Start: 2023-01-04

## 2023-01-04 RX ORDER — LIDOCAINE HYDROCHLORIDE 20 MG/ML
INJECTION, SOLUTION EPIDURAL; INFILTRATION; INTRACAUDAL; PERINEURAL AS NEEDED
Status: DISCONTINUED | OUTPATIENT
Start: 2023-01-04 | End: 2023-01-04 | Stop reason: HOSPADM

## 2023-01-04 RX ADMIN — PROPOFOL 30 MG: 10 INJECTION, EMULSION INTRAVENOUS at 13:03

## 2023-01-04 RX ADMIN — PROPOFOL 50 MG: 10 INJECTION, EMULSION INTRAVENOUS at 12:31

## 2023-01-04 RX ADMIN — PROPOFOL 30 MG: 10 INJECTION, EMULSION INTRAVENOUS at 13:13

## 2023-01-04 RX ADMIN — PROPOFOL 70 MG: 10 INJECTION, EMULSION INTRAVENOUS at 13:02

## 2023-01-04 RX ADMIN — FAMOTIDINE 20 MG: 10 INJECTION, SOLUTION INTRAVENOUS at 11:57

## 2023-01-04 RX ADMIN — PROPOFOL 20 MG: 10 INJECTION, EMULSION INTRAVENOUS at 13:20

## 2023-01-04 RX ADMIN — SODIUM CHLORIDE, POTASSIUM CHLORIDE, SODIUM LACTATE AND CALCIUM CHLORIDE 75 ML/HR: 600; 310; 30; 20 INJECTION, SOLUTION INTRAVENOUS at 11:57

## 2023-01-04 RX ADMIN — LIDOCAINE HYDROCHLORIDE 40 MG: 20 INJECTION, SOLUTION EPIDURAL; INFILTRATION; INTRACAUDAL; PERINEURAL at 13:02

## 2023-01-04 RX ADMIN — PROPOFOL 20 MG: 10 INJECTION, EMULSION INTRAVENOUS at 13:16

## 2023-01-04 RX ADMIN — PROPOFOL 30 MG: 10 INJECTION, EMULSION INTRAVENOUS at 13:07

## 2023-01-04 RX ADMIN — PROPOFOL 30 MG: 10 INJECTION, EMULSION INTRAVENOUS at 13:14

## 2023-01-04 NOTE — H&P
Chief Complaint:    Personal history of colon polyps     History of Present Illness:  Patient had colonoscopy in 2012 with TA noted; colonoscopy 2017 was normal- has 5 year repeat surveillance recommended. She denies any GI issues. No abdominal pain or changes in bowel habits. Has baseline loose stools. No blood in stool. No heartburn- on PPI. She has issues solid food dysphagia- it will sometimes cause pain and it will take about 30min to clear. No issues with N/V. No changes in appetite    On O2 supplemental NC at night.    .   Past Medical History  Medical Conditions:   Acid Reflux  Anemia  aorta and lower stomach anyurism  Arthritis  Asthma  Back Pain (chronic )  Chronic Obstructive Pulmonary Disease (C.O.P.D.)  Fibromyalgia  Heart Murmur  High Blood Pressure  High Cholesterol  Irregular Heart Beat  Migraines  Oxygen dependent, nightly  Pnuemonia  Rheumatic Fever  Sleep apnea (no use of machine)  Thyroid Disease  Surgical Procedures:   Colon Polyp removed, 2012  Appendectomy  Tonsillectomy  Tubal Ligation  L breast bx  Dx Studies:   Colonoscopy, 2012  Endoscopy, 2012  Mammogram, 2022  Medications:   aspirin 81 mg Take 1 tablet by mouth once a day  atenolol 25 mg Take 1 1/2 tablet by mouth once a day  B Complete Take 1 tablet by mouth once a day  calcium carbonate-vitamin D3 600 mg(1,500mg)-200 unit Take 1 tablet by mouth once a day  fluticasone 50 mcg/actuation Spray 2 into both nostrils as needed  furosemide 20 mg Take 1 tablet by mouth as needed and as directed  hydrocodone-acetaminophen 5-325 mg Take tablet by mouth every eight hours as needed  magnesium oxide 400 mg Take 1/2 capsule by mouth once a day  montelukast 10 mg Take 1 tablet by mouth at bedtime  multivitamin Take 1 capsule by mouth once a day  niacin 500 mg Take 1 tablet by mouth twice a day  nifedipine 30 mg Take 1 tablet by mouth once a day  omega 3-dha-epa-fish oil 500-1,000 mg Take 1 capsule by mouth once a day  pantoprazole 40 mg Take 1 tablet by mouth twice a day  potassium 99 mg Take 1/2 tablet by mouth once a day  ProAir HFA 90 mcg/actuation Inhale 2 puff by mouth as needed  Vitamin D2 50,000 unit Take 1 capsule by mouth once a week  Xyzal 5 mg Take 1 tablet by mouth once a day  zolpidem 5 mg Take 1 tablet by mouth at bedtime  Allergies:   Benadryl Allergy - unsure  Codeine - itching  Cymbalta - altered mental status, numbing of limbs  Ivp dye - severe hives  Levaquin - gi upset  Lyrica  Sulfa - itching  Tetanus - arm swelling  Immunizations:   Flu vaccine,   Pneumonia, 2015  SARS-CoV-2, x 4  Social History  Alcohol:   Never used. Tobacco:   Former smoker  Cigarettes 1 pack a day, quit . Drugs:   None  Exercise:   None  Caffeine:   Tea.  daily. Coffee. Marital Status:         Occupation:    retired     Family History   No history of Celiac Sprue, Colon cancer, Colon Polyps, Crohn's Disease, Gallbladder Disease, Inflammatory Bowel Disease, Liver Disease, Polyps, Stomach Cancer, Ulcerative Colitis  Father: ; at age 79; Diagnosed with Alcoholism, Cancer, Cholesterol, Heart Trouble, Stroke; Other: ; Diagnosed with Heart Trouble;  Brother: Diagnosed with Prostate Cancer, Cholesterol, Heart Trouble; Mother: Diagnosed with Arthritis, Cholesterol, Diabetes, High Blood Pressure, Osteoporosis, Thyroid Disease; Other: Diagnosed with Cholesterol;  Review of Systems:  Allergic/Immunologic: Denies allergic reactions, current infections. Cardiovascular: Denies chest pain, irregular heart beat, rapid heart rate/palpitations, ankle swelling. Constitutional: Denies fever, loss of appetite, weight loss. ENMT: Denies nose bleeds, loss of vision, hoarseness, mouth sores. Endocrine: Denies excessive thirst, heat or cold intolerance. Gastrointestinal: Complains of diarrhea, difficulty swallowing.  Denies abdominal pain, abdominal swelling, change in bowel habits, constipation, gas, heartburn, nausea, rectal bleeding, stomach cramps, vomiting, yellowing of skin. Genitourinary: Denies blood in urine, recent darkening of urine. Hematologic/Lymphatic: Denies easy bruising, anemia. Integumentary: Denies itching, rashes, rashes/hives. Musculoskeletal: Denies back pain, joint pain/arthritis. Neurological: Denies dizziness, fainting, frequent headaches, vertigo, memory loss/confusion. Psychiatric: Denies depression, anxiety/panic attacks. Respiratory: Denies wheezing, frequent cough, shortness of breath when at rest.  Vital Signs:  BP  (mmHg)  Pulse  (bpm) Rhythm Weight (lbs/oz) Height (ft/in) BMI  136/84 78 Regular 183 / 6 5 / 2 33.54  SPO2  (%)  94  Physical Exam:  Constitutional:  Appearance: Well developed, well nourished in no acute distress. Skin:  Inspection: No rash or obvious lesion. No spider angioma noted,Not obviously jaundiced. Palpation: No obvious induration or nodules. Eyes:  Conjunctivae/lids: Conjunctiva normal, non-icteric. Pupils/irises:   Respiratory:  Effort: Normal respiratory effort, with no obvious accessory muscle use. Auscultation: No rhonchi, wheezes, or rales bilaterally. Gastrointestinal/Abdomen:  Abdomen: Nondistended, nontender, no rebound or guarding, no mass, no fullness. Liver/Spleen: not palpated. Psychiatric:  Orientation: Alert and oriented to person, place, and time. Mood and affect: Mood and affect appropriate for the situation. Assessment:   BMI 33.0-33.9,adult    Personal history of colonic polyps due for surveillance; will proceed with scheduling. Gastroesophageal reflux disease under control on PPI BID    Dysphagia has intermittent dysphagia and has required dilation and in the past.  Will proceed with EGD for evaluation and dilation. Plan:   The patient was advised to follow-up with their PCP for weight management.     Colonoscopy- EGD with miralax prep; schedule at [facility] with OLIVER EL AM     Continue home medications

## 2023-01-04 NOTE — ANESTHESIA PREPROCEDURE EVALUATION
Relevant Problems   CARDIOVASCULAR   (+) Coronary artery disease involving native coronary artery of native heart without angina pectoris   (+) Hypertension      ENDOCRINE   (+) Arthritis       Anesthetic History   No history of anesthetic complications            Review of Systems / Medical History  Patient summary reviewed and pertinent labs reviewed    Pulmonary    COPD (On Oxygen at night): moderate    Sleep apnea: CPAP           Neuro/Psych   Within defined limits           Cardiovascular    Hypertension: well controlled        Dysrhythmias : PVC  CAD    Exercise tolerance: >4 METS     GI/Hepatic/Renal               Comments: Dysphagia Endo/Other        Arthritis     Other Findings              Physical Exam    Airway  Mallampati: II  TM Distance: 4 - 6 cm  Neck ROM: normal range of motion   Mouth opening: Normal     Cardiovascular  Regular rate and rhythm,  S1 and S2 normal,  no murmur, click, rub, or gallop             Dental      Comments: Permanent dentures   Pulmonary  Breath sounds clear to auscultation               Abdominal  GI exam deferred       Other Findings            Anesthetic Plan    ASA: 3  Anesthesia type: MAC          Induction: Intravenous  Anesthetic plan and risks discussed with: Patient

## 2023-01-04 NOTE — DISCHARGE INSTRUCTIONS
Upper GI Endoscopy: What to Expect at [address] had an upper GI endoscopy. Your doctor used a thin, lighted tube that bends to look at the inside of your esophagus, your stomach, and the first part of the small intestine, called the duodenum. After you have an endoscopy, you will stay at the hospital or clinic for 1 to 2 hours. This will allow the medicine to wear off. You will be able to go home after your doctor or nurse checks to make sure that you're not having any problems. You may have to stay overnight if you had treatment during the test. You may have a sore throat for a day or two after the test.  This care sheet gives you a general idea about what to expect after the test.  How can you care for yourself at home? Activity   Rest as much as you need to after you go home. You should be able to go back to your usual activities the day after the test.  Diet   Follow your doctor's directions for eating after the test.  Drink plenty of fluids (unless your doctor has told you not to). Medications   If you have a sore throat the day after the test, use an over-the-counter spray to numb your throat. Follow-up care is a key part of your treatment and safety. Be sure to make and go to all appointments, and call your doctor if you are having problems. It's also a good idea to know your test results and keep a list of the medicines you take. When should you call for help? Call 911 anytime you think you may need emergency care. For example, call if:    You passed out (lost consciousness). You have trouble breathing. You pass maroon or bloody stools. Call your doctor now or seek immediate medical care if:    You have pain that does not get better after your take pain medicine. You have new or worse belly pain. You have blood in your stools. You are sick to your stomach and cannot keep fluids down. You have a fever. You cannot pass stools or gas.    Watch closely for changes in your health, and be sure to contact your doctor if:    Your throat still hurts after a day or two. You do not get better as expected. Where can you learn more? Go to http://www.ECOtality.com/  Enter J454 in the search box to learn more about \"Upper GI Endoscopy: What to Expect at Home. \"  Current as of: September 8, 2021               Content Version: 13.2  © 2006-2022 [employer]. Care instructions adapted under license by [employer] (which disclaims liability or warranty for this information). If you have questions about a medical condition or this instruction, always ask your healthcare professional. [place] [address] any warranty or liability for your use of this information. Colonoscopy: What to Expect at [address] [place] [address]  After a colonoscopy, you'll stay at the clinic until you wake up. Then you can go home. But you'll need to arrange for a ride. Your doctor will tell you when you can eat and do your other usual activities. Your doctor will talk to you about when you'll need your next colonoscopy. Your doctor can help you decide how often you need to be checked. This will depend on the results of your test and your risk for colorectal cancer. After the test, you may be bloated or have gas pains. You may need to pass gas. If a biopsy was done or a polyp was removed, you may have streaks of blood in your stool (feces) for a few days. Problems such as heavy rectal bleeding may not occur until several weeks after the test. This isn't common. But it can happen after polyps are removed. This care sheet gives you a general idea about how long it will take for you to recover. But each person recovers at a different pace. Follow the steps below to get better as quickly as possible. How can you care for yourself at home? Activity    Rest when you feel tired. You can do your normal activities when it feels okay to do so. Diet    Follow your doctor's directions for eating. Unless your doctor has told you not to, drink plenty of fluids. This helps to replace the fluids that were lost during the colon prep. Do not drink alcohol. Medicines    Your doctor will tell you if and when you can restart your medicines. You will also be given instructions about taking any new medicines. If you take aspirin or some other blood thinner, ask your doctor if and when to start taking it again. Make sure that you understand exactly what your doctor wants you to do. If polyps were removed or a biopsy was done during the test, your doctor may tell you not to take aspirin or other anti-inflammatory medicines for a few days. These include ibuprofen (Advil, Motrin) and naproxen (Aleve). Other instructions    For your safety, do not drive or operate machinery until the medicine wears off and you can think clearly. Your doctor may tell you not to drive or operate machinery until the day after your test.     Do not sign legal documents or make major decisions until the medicine wears off and you can think clearly. The anesthesia can make it hard for you to fully understand what you are agreeing to. Follow-up care is a key part of your treatment and safety. Be sure to make and go to all appointments, and call your doctor if you are having problems. It's also a good idea to know your test results and keep a list of the medicines you take. When should you call for help? Call 911 anytime you think you may need emergency care. For example, call if:    You passed out (lost consciousness). You pass maroon or bloody stools. You have trouble breathing. Call your doctor now or seek immediate medical care if:    You have pain that does not get better after you take pain medicine. You are sick to your stomach or cannot drink fluids. You have new or worse belly pain. You have blood in your stools. You have a fever. You cannot pass stools or gas. Watch closely for changes in your health, and be sure to contact your doctor if you have any problems. Where can you learn more? Go to http://www.gray.com/  Enter E264 in the search box to learn more about \"Colonoscopy: What to Expect at Home. \"  Current as of: September 8, 2021               Content Version: 13.2  © 2006-2022 [employer]. Care instructions adapted under license by [employer] (which disclaims liability or warranty for this information). If you have questions about a medical condition or this instruction, always ask your healthcare professional. [place] [address] any warranty or liability for your use of this information. Diverticulosis: Care Instructions  Your Care Instructions  In diverticulosis, pouches called diverticula form in the wall of the large intestine (colon). The pouches do not cause any pain or other symptoms. Most people who have diverticulosis do not know they have it. But the pouches sometimes bleed, and if they become infected, they can cause pain and other symptoms. When this happens, it is called diverticulitis. Diverticula form when pressure pushes the wall of the colon outward at certain weak points. A diet that is too low in fiber can cause diverticula. Follow-up care is a key part of your treatment and safety. Be sure to make and go to all appointments, and call your doctor if you are having problems. It's also a good idea to know your test results and keep a list of the medicines you take. How can you care for yourself at home? Include fruits, leafy green vegetables, beans, and whole grains in your diet each day. These foods are high in fiber. Take a fiber supplement, such as Citrucel or Metamucil, every day if needed. Read and follow all instructions on the label. Drink plenty of fluids.  If you have kidney, heart, or liver disease and have to limit fluids, talk with your doctor before you increase the amount of fluids you drink. Get at least 30 minutes of exercise on most days of the week. Walking is a good choice. You also may want to do other activities, such as running, swimming, cycling, or playing tennis or team sports. Cut out foods that cause gas, pain, or other symptoms. When should you call for help? Call your doctor now or seek immediate medical care if:    You have belly pain. You pass maroon or very bloody stools. You have a fever. You have nausea and vomiting. You have unusual changes in your bowel movements or abdominal swelling. You have burning pain when you urinate. You have abnormal vaginal discharge. You have shoulder pain. You have cramping pain that does not get better when you have a bowel movement or pass gas. You pass gas or stool from your urethra while urinating. Watch closely for changes in your health, and be sure to contact your doctor if you have any problems. Where can you learn more? Go to http://www.gray.com/  Enter [id number] in the search box to learn more about \"Diverticulosis: Care Instructions. \"  Current as of: June 6, 2022               Content Version: 13.4  © 6362-1536 [employer]. Care instructions adapted under license by [employer] (which disclaims liability or warranty for this information). If you have questions about a medical condition or this instruction, always ask your healthcare professional. [place] [address] any warranty or liability for your use of this information. DISCHARGE SUMMARY from Nurse     POST-PROCEDURE INSTRUCTIONS:    Call your Physician if you:  Observe any excess bleeding. Develop a temperature over 100.5o F. Experience abdominal, shoulder or chest pain.   Notice any signs of decreased circulation or nerve impairment to an extremity such as a change in color, persistent numbness, tingling, coldness or increase in pain. Vomit blood or you have nausea and vomiting lasting longer than 4 hours. Are unable to take medications. Are unable to urinate within 8 hours after discharge following general anesthesia or intravenous sedation. For the next 24 hours after receiving general anesthesia or intravenous sedation, or while taking prescription Narcotics, limit your activities:  Do NOT drive a motor vehicle, operate hazard machinery or power tools, or perform tasks that require coordination. The medication you received during your procedure may have some effect on your mental awareness. Do NOT make important personal or business decisions. The medication you received during your procedure may have some effect on your mental awareness. Do NOT drink alcoholic beverages. These drinks do not mix well with the medications that have been given to you. Upon discharge from the hospital, you must be accompanied by a responsible adult. Resume your diet as directed by your physician. Resume medications as your physician has prescribed. Please give a list of your current medications to your Primary Care Provider. Please update this list whenever your medications are discontinued, doses are changed, or new medications (including over-the-counter products) are added. Please carry medication information at all times in case of emergency situations. These are general instructions for a healthy lifestyle:    No smoking/ No tobacco products/ Avoid exposure to second hand smoke. Surgeon General's Warning:  Quitting smoking now greatly reduces serious risk to your health. Obesity, smoking, and a sedentary lifestyle greatly increase your risk for illness.   A healthy diet, regular physical exercise & weight monitoring are important for maintaining a healthy lifestyle  You may be retaining fluid if you have a history of heart failure or if you experience any of the following symptoms:  Weight gain of 3 pounds or more overnight or 5 pounds in a week, increased swelling in our hands or feet or shortness of breath while lying flat in bed. Please call your doctor as soon as you notice any of these symptoms; do not wait until your next office visit. Recognize signs and symptoms of STROKE:  F  -  Face looks uneven  A  -  Arms unable to move or move unevenly  S  -  Speech slurred or non-existent  T  -  Time to call 911 - as soon as signs and symptoms begin - DO NOT go back to bed or wait to see If you get better - TIME IS BRAIN. Colorectal Screening  Colorectal cancer almost always develops from precancerous polyps (abnormal growths) in the colon or rectum. Screening tests can find precancerous polyps, so that they can be removed before they turn into cancer. Screening tests can also find colorectal cancer early, when treatment works best.  Speak with your physician about when you should begin screening and how often you should be tested. [employer] Activation    Thank you for requesting access to [employer]. Please follow the instructions below to securely access and download your online medical record. [employer] allows you to send messages to your doctor, view your test results, renew your prescriptions, schedule appointments, and more. How Do I Sign Up? In your internet browser, go to [email or web address]). [employer]. Click on the First Time User? Click Here link in the Sign In box. You will see the New Member Sign Up page. Enter your [employer] Access Code exactly as it appears below. You will not need to use this code after youve completed the sign-up process. If you do not sign up before the expiration date, you must request a new code. [employer] Access Code:  Activation code not generated  Current [employer] Status: Active (This is the date your [employer] access code will )    Enter the last four digits of your Social Security Number (xxxx) and Date of Birth (mm/dd/yyyy) as indicated and click Submit. You will be taken to the next sign-up page. Create a Higher One ID. This will be your Higher One login ID and cannot be changed, so think of one that is secure and easy to remember. Create a Higher One password. You can change your password at any time. Enter your Password Reset Question and Answer. This can be used at a later time if you forget your password. Enter your e-mail address. You will receive e-mail notification when new information is available in [address] Click Sign Up. You can now view and download portions of your medical record. Click the Chideo link to download a portable copy of your medical information. Additional Information    If you have questions, please call [phone]. Remember, Higher One is NOT to be used for urgent needs. For medical emergencies, dial 911. Educational references and/or instructions provided during this visit included:    See Attached      APPOINTMENTS:    Per MD Instruction    Discharge information has been reviewed with the patient. The patient verbalized understanding.

## 2023-01-06 NOTE — ANESTHESIA POSTPROCEDURE EVALUATION
Procedure(s):  UPPER ENDOSCOPY with biopsy and dilation  COLONOSCOPY.     MAC    Anesthesia Post Evaluation      Multimodal analgesia: multimodal analgesia used between 6 hours prior to anesthesia start to PACU discharge  Patient location during evaluation: bedside  Patient participation: complete - patient participated  Level of consciousness: awake  Pain management: adequate  Airway patency: patent  Anesthetic complications: no  Cardiovascular status: stable  Respiratory status: acceptable  Hydration status: acceptable  Post anesthesia nausea and vomiting:  controlled  Final Post Anesthesia Temperature Assessment:  Normothermia (36.0-37.5 degrees C)      INITIAL Post-op Vital signs:   Vitals Value Taken Time   /54 01/04/23 1339   Temp 36.2 °C (97.2 °F) 01/04/23 1339   Pulse 49 01/04/23 1339   Resp 11 01/04/23 1339   SpO2 95 % 01/04/23 1339

## 2023-01-11 ENCOUNTER — HOSPITAL ENCOUNTER (EMERGENCY)
Age: 73
Discharge: HOME | End: 2023-01-11
Attending: EMERGENCY MEDICINE
Payer: MEDICARE

## 2023-01-11 VITALS
OXYGEN SATURATION: 99 % | RESPIRATION RATE: 16 BRPM | DIASTOLIC BLOOD PRESSURE: 75 MMHG | HEART RATE: 66 BPM | SYSTOLIC BLOOD PRESSURE: 148 MMHG | TEMPERATURE: 97.9 F

## 2023-01-11 DIAGNOSIS — R60.0 ARM EDEMA: ICD-10-CM

## 2023-01-11 DIAGNOSIS — T14.8XXA BITE: Primary | ICD-10-CM

## 2023-01-11 PROCEDURE — 99283 EMERGENCY DEPT VISIT LOW MDM: CPT

## 2023-01-11 PROCEDURE — 74011250637 HC RX REV CODE- 250/637: Performed by: PHYSICIAN ASSISTANT

## 2023-01-11 RX ORDER — DIPHENHYDRAMINE HCL 25 MG
25 CAPSULE ORAL
Status: COMPLETED | OUTPATIENT
Start: 2023-01-11 | End: 2023-01-11

## 2023-01-11 RX ORDER — FAMOTIDINE 20 MG/1
20 TABLET, FILM COATED ORAL
Status: COMPLETED | OUTPATIENT
Start: 2023-01-11 | End: 2023-01-11

## 2023-01-11 RX ADMIN — Medication 25 MG: at 18:15

## 2023-01-11 RX ADMIN — FAMOTIDINE 20 MG: 20 TABLET, FILM COATED ORAL at 18:16

## 2023-01-11 NOTE — ED PROVIDER NOTES
EMERGENCY DEPARTMENT HISTORY AND PHYSICAL EXAM    6:24 PM      Date: 1/11/2023  Patient Name: Lucky Sandhoff    History of Presenting Illness     Chief Complaint   Patient presents with    Insect Bite         History Provided By: Patient    Additional History (Context): Lucky Sandhoff is a 67 y.o. female with  noted PMH  who presents with complaint of left wrist pain and swelling x1 hour after an insect bite. Patient denies fever or chills, numbness or tingling, fall or trauma. Denies taking any medication for the symptoms prior to arrival.  Denies exacerbating or alleviating factors. PCP: Mary Lou Mei MD    Current Outpatient Medications   Medication Sig Dispense Refill    nitroglycerin (NITROSTAT) 0.4 mg SL tablet 0.4 mg by SubLINGual route every five (5) minutes as needed for Chest Pain. Up to 3 doses. tiotropium bromide (Spiriva Respimat) 1.25 mcg/actuation inhaler Take 2 Puffs by inhalation daily. 4 g 0    HYDROcodone-acetaminophen (NORCO) 5-325 mg per tablet Take 1 Tab by mouth every eight (8) hours as needed. cholecalciferol (VITAMIN D3) (5000 Units/125 mcg) tab tablet Take 5,000 Units by mouth daily. cyclobenzaprine (FLEXERIL) 10 mg tablet Take 5 mg by mouth three (3) times daily as needed. magnesium 250 mg tab Take 125 mg by mouth daily. niacin (SLO-NIACIN) 500 mg ER Tablet Take 2 Tabs by mouth nightly. OXYGEN-AIR DELIVERY SYSTEMS 2 L/min by Does Not Apply route nightly. Indications: DME: [facility]      Flaxseed Oil oil 1 Tab by Does Not Apply route daily. NIFEdipine ER (PROCARDIA XL) 30 mg ER tablet Take 30 mg by mouth daily. atenoloL (TENORMIN) 50 mg tablet Take 25 mg by mouth two (2) times a day. atorvastatin (LIPITOR) 40 mg tablet Take 80 mg by mouth daily. pantoprazole (PROTONIX) 40 mg tablet Take 40 mg by mouth two (2) times a day. zolpidem (AMBIEN) 10 mg tablet Take 5 mg by mouth nightly as needed for Sleep. levocetirizine (XYZAL) 5 mg tablet Take 5 mg by mouth daily. fluticasone propionate (FLONASE) 50 mcg/actuation nasal spray 2 Sprays by Both Nostrils route two (2) times daily as needed. montelukast (SINGULAIR) 10 mg tablet Take 10 mg by mouth nightly. multivitamin (ONE A DAY) tablet Take 1 Tab by mouth daily. aspirin delayed-release 81 mg tablet Take 81 mg by mouth daily. VITAMIN B COMPLEX (B COMPLETE PO) Take 1 Tab by mouth daily. potassium 99 mg tablet Take 45.5 mg by mouth daily. Past History     Past Medical History:  Past Medical History:   Diagnosis Date    Arthritis     Chronic obstructive pulmonary disease ([address]     Coronary artery disease involving native coronary artery of native heart without angina pectoris 2021    Nonobstructive CAD. Mid RCA 50-60%, negative IFR. Left main, LAD, left circumflex patent.     Descending aortic aneurysm (HCC)     Fibromyalgia     Hypercholesterolemia     Hypertension     Ill-defined condition     Irregular heart beat     Oxygen dependent     at night [facility]    Sleep apnea     no cpap, but wear Oxygen at 2Li at night       Past Surgical History:  Past Surgical History:   Procedure Laterality Date    COLONOSCOPY N/A 2017    COLONOSCOPY with biopsies performed by Jerald Gupta MD at [facility]    COLONOSCOPY N/A 2023    COLONOSCOPY performed by Jenny Brooks MD at [facility]    HX APPENDECTOMY      HX BREAST BIOPSY Left     HX CATARACT REMOVAL      HX HEART CATHETERIZATION      x2    HX TUBAL LIGATION         Family History:  Family History   Problem Relation Age of Onset    Hypertension Mother     Diabetes Mother     Cancer Father     Stroke Father        Social History:  Social History     Tobacco Use    Smoking status: Former     Packs/day: 1.00     Years: 45.00     Pack years: 45.00     Types: Cigarettes     Quit date: 2014     Years since quittin.0    Smokeless tobacco: Never   Vaping Use    Vaping Use: Never used   Substance Use Topics    Alcohol use: No    Drug use: No       Allergies: Allergies   Allergen Reactions    Benadryl [Diphenhydramine Hcl] Hives    Codeine Itching    Cymbalta [Duloxetine] Other (comments)     Shaking, feeling someone living inside my body, arms going numb. Iodinated Contrast Media Hives     Pt states severe hives     Levaquin [Levofloxacin] Nausea Only    Lyrica [Pregabalin] Other (comments)     Shaking, feeling someone living in my body, numb arm. Any meds for fibromyialga    Other Medication Swelling     Pt wrote having an allergic reaction to \"tetnus\". \"Extreme swelling at site\"     Sulfur Hives         Review of Systems       Review of Systems   Constitutional:  Negative for chills and fever. Respiratory:  Negative for shortness of breath. Cardiovascular:  Negative for chest pain. Gastrointestinal:  Negative for abdominal pain, nausea and vomiting. Musculoskeletal:  Positive for arthralgias and joint swelling. Skin:  Negative for rash. Neurological:  Negative for weakness. All other systems reviewed and are negative. Physical Exam   Visit Vitals  BP (!) 148/75 (BP 1 Location: Left upper arm, BP Patient Position: At rest)   Pulse 66   Temp 97.9 °F (36.6 °C)   Resp 16   SpO2 99%         Physical Exam  Vitals and nursing note reviewed. Constitutional:       General: She is not in acute distress. Appearance: She is well-developed. She is not diaphoretic. HENT:      Head: Normocephalic and atraumatic. Cardiovascular:      Rate and Rhythm: Normal rate and regular rhythm. Heart sounds: Normal heart sounds. No murmur heard. No friction rub. No gallop. Pulmonary:      Effort: Pulmonary effort is normal. No respiratory distress. Breath sounds: Normal breath sounds. No wheezing or rales. Musculoskeletal:         General: Normal range of motion. Arms:       Cervical back: Normal range of motion and neck supple.    Skin:     General: Skin is warm. Findings: No rash. Neurological:      Mental Status: She is alert. Diagnostic Study Results     Labs -  No results found for this or any previous visit (from the past 12 hour(s)). Radiologic Studies -   No orders to display         Medical Decision Making   I am the first provider for this patient. I reviewed the vital signs, available nursing notes, past medical history, past surgical history, family history and social history. Vital Signs-Reviewed the patient's vital signs. Records Reviewed: Nursing Notes and Old Medical Records (Time of Review: 6:24 PM)    ED Course: Progress Notes, Reevaluation, and Consults:  4:54 PM: Reviewed plan with patient. Discussed need for close outpatient follow-up this week for reassessment. Discussed strict return precautions, including fever, increased rash or any other medical concerns. Pt in agreement with plan. Provider Notes (Medical Decision Making): 21-year-old female who presents to the ED due to left wrist pain and swelling x1 hour after insect bite. Afebrile, nontoxic-appearing, looks well. No evidence of cellulitis, abscess, necrosis. Symptomatic management provided in the ED. Patient is stable for discharge with close outpatient follow-up for further assessment. Strict return precautions provided. Diagnosis     Clinical Impression:   1. Bite    2. Arm edema        Disposition: home     Follow-up Information       Follow up With Specialties Details Why [address] [place] [address] EMERGENCY DEPT Emergency Medicine  If symptoms worsen [address]  [phone]    Annalise Foster MD General Practice Schedule an appointment as soon as possible for a visit   [address] [facility] [phone]               Patient's Medications   Start Taking    No medications on file   Continue Taking    ASPIRIN DELAYED-RELEASE 81 MG TABLET    Take 81 mg by mouth daily.     ATENOLOL (TENORMIN) 50 MG TABLET    Take 25 mg by mouth two (2) times a day. ATORVASTATIN (LIPITOR) 40 MG TABLET    Take 80 mg by mouth daily. CHOLECALCIFEROL (VITAMIN D3) (5000 UNITS/125 MCG) TAB TABLET    Take 5,000 Units by mouth daily. CYCLOBENZAPRINE (FLEXERIL) 10 MG TABLET    Take 5 mg by mouth three (3) times daily as needed. FLAXSEED OIL OIL    1 Tab by Does Not Apply route daily. FLUTICASONE PROPIONATE (FLONASE) 50 MCG/ACTUATION NASAL SPRAY    2 Sprays by Both Nostrils route two (2) times daily as needed. HYDROCODONE-ACETAMINOPHEN (NORCO) 5-325 MG PER TABLET    Take 1 Tab by mouth every eight (8) hours as needed. LEVOCETIRIZINE (XYZAL) 5 MG TABLET    Take 5 mg by mouth daily. MAGNESIUM 250 MG TAB    Take 125 mg by mouth daily. MONTELUKAST (SINGULAIR) 10 MG TABLET    Take 10 mg by mouth nightly. MULTIVITAMIN (ONE A DAY) TABLET    Take 1 Tab by mouth daily. NIACIN (SLO-NIACIN) 500 MG ER TABLET    Take 2 Tabs by mouth nightly. NIFEDIPINE ER (PROCARDIA XL) 30 MG ER TABLET    Take 30 mg by mouth daily. NITROGLYCERIN (NITROSTAT) 0.4 MG SL TABLET    0.4 mg by SubLINGual route every five (5) minutes as needed for Chest Pain. Up to 3 doses. OXYGEN-AIR DELIVERY SYSTEMS    2 L/min by Does Not Apply route nightly. Indications: DME: [facility]    PANTOPRAZOLE (PROTONIX) 40 MG TABLET    Take 40 mg by mouth two (2) times a day. POTASSIUM 99 MG TABLET    Take 45.5 mg by mouth daily. TIOTROPIUM BROMIDE (SPIRIVA RESPIMAT) 1.25 MCG/ACTUATION INHALER    Take 2 Puffs by inhalation daily. VITAMIN B COMPLEX (B COMPLETE PO)    Take 1 Tab by mouth daily. ZOLPIDEM (AMBIEN) 10 MG TABLET    Take 5 mg by mouth nightly as needed for Sleep. These Medications have changed    No medications on file   Stop Taking    No medications on file       Dictation disclaimer:  Please note that this dictation was completed with [employer], the [employer] voice recognition software.   Quite often unanticipated grammatical, syntax, homophones, and other interpretive errors are inadvertently transcribed by the computer software. Please disregard these errors. Please excuse any errors that have escaped final proofreading.

## 2023-02-01 ENCOUNTER — TRANSCRIBE ORDERS (OUTPATIENT)
Facility: HOSPITAL | Age: 73
End: 2023-02-01

## 2023-02-01 DIAGNOSIS — Z12.31 VISIT FOR SCREENING MAMMOGRAM: Primary | ICD-10-CM

## 2023-02-07 DIAGNOSIS — I71.40 ABDOMINAL AORTIC ANEURYSM (AAA) WITHOUT RUPTURE, UNSPECIFIED PART: Primary | ICD-10-CM

## 2023-03-06 ENCOUNTER — HOSPITAL ENCOUNTER (OUTPATIENT)
Facility: HOSPITAL | Age: 73
Discharge: HOME | End: 2023-03-09
Payer: MEDICARE

## 2023-03-06 LAB
25(OH)D3 SERPL-MCNC: 59.5 NG/ML (ref 30–100)
BASOPHILS # BLD: 0.1 K/UL (ref 0–0.1)
BASOPHILS NFR BLD: 1 % (ref 0–2)
CHOLEST SERPL-MCNC: 137 MG/DL
DIFFERENTIAL METHOD BLD: NORMAL
EOSINOPHIL # BLD: 0.2 K/UL (ref 0–0.4)
EOSINOPHIL NFR BLD: 4 % (ref 0–5)
ERYTHROCYTE [DISTWIDTH] IN BLOOD BY AUTOMATED COUNT: 13.7 % (ref 11.6–14.5)
HBA1C MFR BLD: 6 % (ref 4.2–5.6)
HCT VFR BLD AUTO: 39.5 % (ref 35–45)
HDLC SERPL-MCNC: 68 MG/DL (ref 40–60)
HDLC SERPL: 2 (ref 0–5)
HGB BLD-MCNC: 12.9 G/DL (ref 12–16)
IMM GRANULOCYTES # BLD AUTO: 0 K/UL (ref 0–0.04)
IMM GRANULOCYTES NFR BLD AUTO: 0 % (ref 0–0.5)
LDLC SERPL CALC-MCNC: 35.6 MG/DL (ref 0–100)
LIPID PANEL: ABNORMAL
LYMPHOCYTES # BLD: 2.3 K/UL (ref 0.9–3.6)
LYMPHOCYTES NFR BLD: 36 % (ref 21–52)
MCH RBC QN AUTO: 30.4 PG (ref 24–34)
MCHC RBC AUTO-ENTMCNC: 32.7 G/DL (ref 31–37)
MCV RBC AUTO: 92.9 FL (ref 78–100)
MONOCYTES # BLD: 0.6 K/UL (ref 0.05–1.2)
MONOCYTES NFR BLD: 10 % (ref 3–10)
NEUTS SEG # BLD: 3.3 K/UL (ref 1.8–8)
NEUTS SEG NFR BLD: 50 % (ref 40–73)
NRBC # BLD: 0 K/UL (ref 0–0.01)
NRBC BLD-RTO: 0 PER 100 WBC
PLATELET # BLD AUTO: 209 K/UL (ref 135–420)
PMV BLD AUTO: 11.3 FL (ref 9.2–11.8)
RBC # BLD AUTO: 4.25 M/UL (ref 4.2–5.3)
T4 FREE SERPL-MCNC: 1 NG/DL (ref 0.7–1.5)
TRIGL SERPL-MCNC: 167 MG/DL
TSH SERPL DL<=0.05 MIU/L-ACNC: 1.08 UIU/ML (ref 0.36–3.74)
VLDLC SERPL CALC-MCNC: 33.4 MG/DL
WBC # BLD AUTO: 6.5 K/UL (ref 4.6–13.2)

## 2023-03-06 PROCEDURE — 80053 COMPREHEN METABOLIC PANEL: CPT

## 2023-03-06 PROCEDURE — 82306 VITAMIN D 25 HYDROXY: CPT

## 2023-03-06 PROCEDURE — 85025 COMPLETE CBC W/AUTO DIFF WBC: CPT

## 2023-03-06 PROCEDURE — 84439 ASSAY OF FREE THYROXINE: CPT

## 2023-03-06 PROCEDURE — 80061 LIPID PANEL: CPT

## 2023-03-06 PROCEDURE — 36415 COLL VENOUS BLD VENIPUNCTURE: CPT

## 2023-03-06 PROCEDURE — 83036 HEMOGLOBIN GLYCOSYLATED A1C: CPT

## 2023-03-07 ENCOUNTER — HOSPITAL ENCOUNTER (OUTPATIENT)
Facility: HOSPITAL | Age: 73
Discharge: HOME | End: 2023-03-10
Payer: MEDICARE

## 2023-03-07 DIAGNOSIS — Z12.31 VISIT FOR SCREENING MAMMOGRAM: ICD-10-CM

## 2023-03-07 LAB
ALBUMIN SERPL-MCNC: 4 G/DL (ref 3.4–5)
ALBUMIN/GLOB SERPL: 1.2 (ref 0.8–1.7)
ALP SERPL-CCNC: 108 U/L (ref 45–117)
ALT SERPL-CCNC: 28 U/L (ref 13–56)
ANION GAP SERPL CALC-SCNC: 7 MMOL/L (ref 3–18)
AST SERPL-CCNC: 28 U/L (ref 10–38)
BILIRUB SERPL-MCNC: 0.4 MG/DL (ref 0.2–1)
BUN SERPL-MCNC: 20 MG/DL (ref 7–18)
BUN/CREAT SERPL: 17 (ref 12–20)
CALCIUM SERPL-MCNC: 9.6 MG/DL (ref 8.5–10.1)
CHLORIDE SERPL-SCNC: 105 MMOL/L (ref 100–111)
CO2 SERPL-SCNC: 30 MMOL/L (ref 21–32)
CREAT SERPL-MCNC: 1.16 MG/DL (ref 0.6–1.3)
GLOBULIN SER CALC-MCNC: 3.4 G/DL (ref 2–4)
GLUCOSE SERPL-MCNC: 108 MG/DL (ref 74–99)
POTASSIUM SERPL-SCNC: 4 MMOL/L (ref 3.5–5.5)
PROT SERPL-MCNC: 7.4 G/DL (ref 6.4–8.2)
SODIUM SERPL-SCNC: 142 MMOL/L (ref 136–145)

## 2023-03-07 PROCEDURE — 77063 BREAST TOMOSYNTHESIS BI: CPT

## 2023-07-03 ENCOUNTER — HOSPITAL ENCOUNTER (OUTPATIENT)
Facility: HOSPITAL | Age: 73
Discharge: HOME | End: 2023-07-06
Payer: MEDICARE

## 2023-07-03 DIAGNOSIS — R91.1 COIN LESION: ICD-10-CM

## 2023-07-03 DIAGNOSIS — I71.40 ABDOMINAL AORTIC ANEURYSM (AAA) WITHOUT RUPTURE, UNSPECIFIED PART (HCC): ICD-10-CM

## 2023-07-03 PROCEDURE — 71271 CT THORAX LUNG CANCER SCR C-: CPT

## 2023-08-15 ENCOUNTER — OFFICE VISIT (OUTPATIENT)
Age: 73
End: 2023-08-15
Payer: MEDICARE

## 2023-08-15 VITALS
SYSTOLIC BLOOD PRESSURE: 133 MMHG | DIASTOLIC BLOOD PRESSURE: 70 MMHG | RESPIRATION RATE: 16 BRPM | WEIGHT: 181.4 LBS | TEMPERATURE: 97.7 F | HEART RATE: 71 BPM | BODY MASS INDEX: 33.38 KG/M2 | OXYGEN SATURATION: 96 % | HEIGHT: 62 IN

## 2023-08-15 DIAGNOSIS — J44.9 COPD WITH ASTHMA (HCC): ICD-10-CM

## 2023-08-15 DIAGNOSIS — R06.09 DYSPNEA ON EXERTION: ICD-10-CM

## 2023-08-15 DIAGNOSIS — J44.9 BRONCHITIS WITH CHRONIC AIRWAY OBSTRUCTION (HCC): ICD-10-CM

## 2023-08-15 DIAGNOSIS — G47.34 NOCTURNAL HYPOXEMIA: Primary | ICD-10-CM

## 2023-08-15 DIAGNOSIS — R91.1 INCIDENTAL LUNG NODULE, > 3MM AND < 8MM: ICD-10-CM

## 2023-08-15 PROCEDURE — 3078F DIAST BP <80 MM HG: CPT | Performed by: INTERNAL MEDICINE

## 2023-08-15 PROCEDURE — 3017F COLORECTAL CA SCREEN DOC REV: CPT | Performed by: INTERNAL MEDICINE

## 2023-08-15 PROCEDURE — G8427 DOCREV CUR MEDS BY ELIG CLIN: HCPCS | Performed by: INTERNAL MEDICINE

## 2023-08-15 PROCEDURE — 1090F PRES/ABSN URINE INCON ASSESS: CPT | Performed by: INTERNAL MEDICINE

## 2023-08-15 PROCEDURE — 1123F ACP DISCUSS/DSCN MKR DOCD: CPT | Performed by: INTERNAL MEDICINE

## 2023-08-15 PROCEDURE — 3075F SYST BP GE 130 - 139MM HG: CPT | Performed by: INTERNAL MEDICINE

## 2023-08-15 PROCEDURE — 99214 OFFICE O/P EST MOD 30 MIN: CPT | Performed by: INTERNAL MEDICINE

## 2023-08-15 PROCEDURE — G8417 CALC BMI ABV UP PARAM F/U: HCPCS | Performed by: INTERNAL MEDICINE

## 2023-08-15 PROCEDURE — 3023F SPIROM DOC REV: CPT | Performed by: INTERNAL MEDICINE

## 2023-08-15 PROCEDURE — 1036F TOBACCO NON-USER: CPT | Performed by: INTERNAL MEDICINE

## 2023-08-15 PROCEDURE — G8399 PT W/DXA RESULTS DOCUMENT: HCPCS | Performed by: INTERNAL MEDICINE

## 2023-08-15 RX ORDER — ALBUTEROL SULFATE 90 UG/1
2 AEROSOL, METERED RESPIRATORY (INHALATION) EVERY 6 HOURS PRN
COMMUNITY

## 2023-08-15 RX ORDER — TIOTROPIUM BROMIDE INHALATION SPRAY 1.56 UG/1
2 SPRAY, METERED RESPIRATORY (INHALATION) DAILY
Qty: 1 EACH | Refills: 3 | Status: SHIPPED | OUTPATIENT
Start: 2023-08-15 | End: 2023-08-18

## 2023-08-15 NOTE — PROGRESS NOTES
[facility]   Pulmonary, Critical Care, and Sleep Medicine           Pulmonary Office visit. Subjective:        Patient is a 67 y.o. female is  here for follow-up after diagnosis of COVID and evaluation for progressive shortness of breath   follow up-evaluation for need for nocturnal Oxygen supplementation, evaluation of an abnormal Ct scan of chest.      08/15/23   CC: Continued symptoms of shortness of breath with activity-patient reports finding it difficult to climb stairs, walk certain distances and even carry out some activities of daily living especially bending or lifting. She was prescribed Spiriva Respimat but she stopped using it due to the cost-she had noted some improvement when she was using it  She has albuterol which she uses infrequently  Patient is concerned about ongoing symptoms and asking if this is coming from her lungs or heart? Still has minimal intermittent residual cough  Using night time O2  Had titration study completed- results suggest nocturnal Oxygen desaturation despite CPAP 9 cm. Needs 2 L Oxygen in addition. She had a mandibular advancement device but is not wearing it now after she had tooth work done  On Singulair, flonase  She also has h/o allergies and is receiving allergy immunotherapy by Dr. Billy Kilgore. She had a LDCT ordered by her PCP which she completed in July 2023. HPI:   Followed from 7/2014 To 10/2014. Most recent follow up Ct scan 7/2015 with stability in nodule. She had 24 month follow up CT completed and is here to discuss results. She also had follow up Sleep evaluation- Dr. Bobbette Aase who reports nocturnal hypoxemia on initial study and follow up titration study. Patient was asked to follow up with Pulmonology. She has smoked less than 1 ppd for past 47+ years and eventually quit smoking in 2013   She denies any sleep related problems, leg swelling. Denies joint pains.    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

## 2023-08-15 NOTE — PROGRESS NOTES
Yamel Martinez presents today for   Chief Complaint   Patient presents with    Shortness of Breath     With exertion     Results     CT Lung Screening        Is someone accompanying this pt? No    Is the patient using any DME equipment during OV? No    -DME Company NA    Depression Screening:    PHQ-9 Questionaire 10/31/2022   Little interest or pleasure in doing things 0   Feeling down, depressed, or hopeless 0   PHQ-9 Total Score 0       Learning Needs Questionnaire:     No question data found. Fall Risk:     No flowsheet data found. Abuse Screening:     No flowsheet data found. Coordination of Care:    1. Have you been to the ER, urgent care clinic since your last visit? Hospitalized since your last visit? No    2. Have you seen or consulted any other health care providers outside of the [address] [place] [address] since your last visit? Include any pap smears or colon screening. No    Medication list has been update per patient.

## 2023-08-17 ENCOUNTER — HOSPITAL ENCOUNTER (OUTPATIENT)
Facility: HOSPITAL | Age: 73
Discharge: HOME | End: 2023-08-17
Payer: MEDICARE

## 2023-08-17 DIAGNOSIS — M75.51 BURSITIS OF RIGHT SHOULDER: ICD-10-CM

## 2023-08-17 DIAGNOSIS — M75.21 BICEPS TENDINITIS OF RIGHT UPPER EXTREMITY: ICD-10-CM

## 2023-08-17 PROCEDURE — 73221 MRI JOINT UPR EXTREM W/O DYE: CPT

## 2023-08-18 ENCOUNTER — OFFICE VISIT (OUTPATIENT)
Age: 73
End: 2023-08-18

## 2023-08-18 VITALS
WEIGHT: 181 LBS | DIASTOLIC BLOOD PRESSURE: 80 MMHG | HEART RATE: 57 BPM | SYSTOLIC BLOOD PRESSURE: 132 MMHG | BODY MASS INDEX: 33.31 KG/M2 | OXYGEN SATURATION: 97 % | HEIGHT: 62 IN

## 2023-08-18 DIAGNOSIS — E78.00 PURE HYPERCHOLESTEROLEMIA, UNSPECIFIED: ICD-10-CM

## 2023-08-18 DIAGNOSIS — I25.10 ATHEROSCLEROSIS OF NATIVE CORONARY ARTERY OF NATIVE HEART WITHOUT ANGINA PECTORIS: Primary | ICD-10-CM

## 2023-08-18 DIAGNOSIS — I71.9 AORTIC ANEURYSM WITHOUT RUPTURE, UNSPECIFIED PORTION OF AORTA (HCC): ICD-10-CM

## 2023-08-18 DIAGNOSIS — I10 ESSENTIAL (PRIMARY) HYPERTENSION: ICD-10-CM

## 2023-08-18 PROBLEM — N18.2 CHRONIC KIDNEY DISEASE, STAGE 2 (MILD): Status: ACTIVE | Noted: 2022-11-11

## 2023-08-18 PROBLEM — I12.9 MALIGNANT HYPERTENSIVE KIDNEY DISEASE WITH CHRONIC KIDNEY DISEASE STAGE I THROUGH STAGE IV, OR UNSPECIFIED: Status: ACTIVE | Noted: 2022-08-16

## 2023-08-18 RX ORDER — ZOLPIDEM TARTRATE 5 MG/1
5 TABLET ORAL NIGHTLY PRN
COMMUNITY

## 2023-08-18 RX ORDER — ECHINACEA 400 MG
1000 CAPSULE ORAL DAILY
COMMUNITY

## 2023-08-18 RX ORDER — TRAZODONE HYDROCHLORIDE 50 MG/1
50 TABLET ORAL
COMMUNITY
Start: 2023-07-13

## 2023-08-18 RX ORDER — VITAMIN B COMPLEX
1 CAPSULE ORAL DAILY
COMMUNITY

## 2023-08-18 RX ORDER — ATORVASTATIN CALCIUM 80 MG/1
80 TABLET, FILM COATED ORAL DAILY
COMMUNITY
Start: 2023-06-27

## 2023-08-18 RX ORDER — MULTIVITAMIN
1 CAPSULE ORAL DAILY
COMMUNITY

## 2023-08-18 RX ORDER — ATENOLOL 25 MG/1
25 TABLET ORAL DAILY
COMMUNITY

## 2023-08-18 RX ORDER — ALBUTEROL SULFATE 2.5 MG/3ML
2.5 SOLUTION RESPIRATORY (INHALATION) EVERY 6 HOURS PRN
COMMUNITY

## 2023-08-18 RX ORDER — MULTIVITAMIN WITH IRON
125 TABLET ORAL DAILY
COMMUNITY

## 2023-08-18 RX ORDER — PHENOL 1.4 %
1 AEROSOL, SPRAY (ML) MUCOUS MEMBRANE 2 TIMES DAILY PRN
COMMUNITY

## 2023-08-18 RX ORDER — FUROSEMIDE 20 MG/1
20 TABLET ORAL DAILY
COMMUNITY

## 2023-08-18 RX ORDER — CYCLOBENZAPRINE HCL 5 MG
5 TABLET ORAL 3 TIMES DAILY PRN
COMMUNITY

## 2023-08-18 RX ORDER — NITROGLYCERIN 0.4 MG/1
0.4 TABLET SUBLINGUAL EVERY 5 MIN PRN
Qty: 25 TABLET | Refills: 3 | Status: SHIPPED | OUTPATIENT
Start: 2023-08-18

## 2023-08-18 ASSESSMENT — ENCOUNTER SYMPTOMS
SORE THROAT: 0
COUGH: 0
NAUSEA: 0
ABDOMINAL DISTENTION: 0
VOMITING: 0
SHORTNESS OF BREATH: 1
ABDOMINAL PAIN: 0

## 2023-08-18 ASSESSMENT — PATIENT HEALTH QUESTIONNAIRE - PHQ9
2. FEELING DOWN, DEPRESSED OR HOPELESS: 0
1. LITTLE INTEREST OR PLEASURE IN DOING THINGS: 0
SUM OF ALL RESPONSES TO PHQ9 QUESTIONS 1 & 2: 0
SUM OF ALL RESPONSES TO PHQ QUESTIONS 1-9: 0

## 2023-08-18 NOTE — PROGRESS NOTES
Jose Rehman presents today for   Chief Complaint   Patient presents with    Follow-up     1 year follow up       Jose Rehman preferred language for health care discussion is english/other. Is someone accompanying this pt? no    Is the patient using any DME equipment during OV? no    Depression Screening:  Depression: Not at risk    PHQ-2 Score: 0        Learning Assessment:  Who is the primary learner? Patient    What is the preferred language for health care of the primary learner? ENGLISH    How does the primary learner prefer to learn new concepts? DEMONSTRATION    Answered By patient    Relationship to Learner SELF           Pt currently taking Anticoagulant therapy? no    Pt currently taking Antiplatelet therapy ? ASA 81 mg once ad ay      Coordination of Care:  1. Have you been to the ER, urgent care clinic since your last visit? Hospitalized since your last visit? no    2. Have you seen or consulted any other health care providers outside of the [address] [place] [address] since your last visit? Include any pap smears or colon screening.  no
Abdomen is soft. Tenderness: There is no abdominal tenderness. Musculoskeletal:         General: No swelling or deformity. Skin:     General: Skin is warm and dry. Findings: No rash. Neurological:      General: No focal deficit present. Mental Status: She is alert and oriented to person, place, and time. Psychiatric:         Mood and Affect: Mood normal.         Behavior: Behavior normal.       EKG: Sinus bradycardia, leftward axis, borderline voltage criteria for LVH, nonspecific ST abnormality. No change compared to the previous tracing. Assessment / Plan:   Nonobstructive coronary artery disease. Patient last underwent a cardiac catheterization in June 2021 which revealed a mid RCA lesion of 50-60% which was nonflow limiting by IFR. She last underwent a nuclear stress test in June 2022 which was a normal and low risk study. She does report resting type chest pain but nothing with exertion. This may represent a variant angina or noncardiac chest pain. I would continue her current regimen which includes an aspirin, beta-blocker and potent statin. Hypertension. Somewhat labile in the past.  She states is now reasonably well controlled on a lower dose of atenolol and nifedipine, both of which I would continue. She currently takes her nifedipine in the morning and atenolol at night. I will continue this regimen. Dyslipidemia. Patient is currently taking atorvastatin 40 mg daily. This is followed closely by her PCP. From a cardiac standpoint I prefer LDL to be less than 70. Small descending thoracic aortic aneurysm. This was last assessed on CTA in June 2022 and measured 3.8 x 3.7 cm. At this time this can be reevaluated every other year with serial imaging. History of tobacco use. Patient quit smoking approximately 8 years ago. She was congratulated and encouraged to remain tobacco free. Dyspnea on exertion. Present since a severe COVID infection.   She does

## 2023-08-25 ENCOUNTER — OFFICE VISIT (OUTPATIENT)
Age: 73
End: 2023-08-25
Payer: MEDICARE

## 2023-08-25 VITALS
HEART RATE: 78 BPM | SYSTOLIC BLOOD PRESSURE: 126 MMHG | OXYGEN SATURATION: 98 % | DIASTOLIC BLOOD PRESSURE: 68 MMHG | WEIGHT: 181 LBS | HEIGHT: 62 IN | BODY MASS INDEX: 33.31 KG/M2

## 2023-08-25 DIAGNOSIS — I71.43 INFRARENAL ABDOMINAL AORTIC ANEURYSM (AAA) WITHOUT RUPTURE (HCC): Primary | ICD-10-CM

## 2023-08-25 PROCEDURE — 1123F ACP DISCUSS/DSCN MKR DOCD: CPT | Performed by: PHYSICIAN ASSISTANT

## 2023-08-25 PROCEDURE — G8427 DOCREV CUR MEDS BY ELIG CLIN: HCPCS | Performed by: PHYSICIAN ASSISTANT

## 2023-08-25 PROCEDURE — 3017F COLORECTAL CA SCREEN DOC REV: CPT | Performed by: PHYSICIAN ASSISTANT

## 2023-08-25 PROCEDURE — 3074F SYST BP LT 130 MM HG: CPT | Performed by: PHYSICIAN ASSISTANT

## 2023-08-25 PROCEDURE — G8399 PT W/DXA RESULTS DOCUMENT: HCPCS | Performed by: PHYSICIAN ASSISTANT

## 2023-08-25 PROCEDURE — 3078F DIAST BP <80 MM HG: CPT | Performed by: PHYSICIAN ASSISTANT

## 2023-08-25 PROCEDURE — 1036F TOBACCO NON-USER: CPT | Performed by: PHYSICIAN ASSISTANT

## 2023-08-25 PROCEDURE — G8417 CALC BMI ABV UP PARAM F/U: HCPCS | Performed by: PHYSICIAN ASSISTANT

## 2023-08-25 PROCEDURE — 99214 OFFICE O/P EST MOD 30 MIN: CPT | Performed by: PHYSICIAN ASSISTANT

## 2023-08-25 PROCEDURE — 1090F PRES/ABSN URINE INCON ASSESS: CPT | Performed by: PHYSICIAN ASSISTANT

## 2023-08-25 ASSESSMENT — PATIENT HEALTH QUESTIONNAIRE - PHQ9
SUM OF ALL RESPONSES TO PHQ QUESTIONS 1-9: 0
SUM OF ALL RESPONSES TO PHQ9 QUESTIONS 1 & 2: 0
SUM OF ALL RESPONSES TO PHQ QUESTIONS 1-9: 0
SUM OF ALL RESPONSES TO PHQ QUESTIONS 1-9: 0
1. LITTLE INTEREST OR PLEASURE IN DOING THINGS: 0
SUM OF ALL RESPONSES TO PHQ QUESTIONS 1-9: 0
2. FEELING DOWN, DEPRESSED OR HOPELESS: 0

## 2023-08-30 NOTE — PROGRESS NOTES
1. Have you been to the ER, urgent care clinic since your last visit? No      Hospitalized since your last visit? No     2. Have you seen or consulted any other health care providers outside of the [address] [place] [address] since your last visit? Include any pap smears or colon screening.   No
fluticasone propionate (FLONASE) 50 mcg/actuation nasal spray 2 Sprays by Both Nostrils route two (2) times daily as needed. montelukast (SINGULAIR) 10 mg tablet Take 10 mg by mouth nightly. multivitamin (ONE A DAY) tablet Take 1 Tab by mouth daily. aspirin delayed-release 81 mg tablet Take 81 mg by mouth daily. VITAMIN B COMPLEX (B COMPLETE PO) Take 1 Tab by mouth daily. potassium 99 mg tablet Take 45.5 mg by mouth daily. atenoloL (TENORMIN) 50 mg tablet Take 25 mg by mouth daily. Allergies   Allergen Reactions    Benadryl [Diphenhydramine Hcl] Hives    Codeine Itching    Cymbalta [Duloxetine] Other (comments)     Shaking, feeling someone living inside my body, arms going numb. Iodinated Contrast Media Hives     Pt states severe hives     Levaquin [Levofloxacin] Nausea Only    Lyrica [Pregabalin] Other (comments)     Shaking, feeling someone living in my body, numb arm. Any meds for fibromyialga    Other Medication Swelling     Pt wrote having an allergic reaction to \"tetnus\".  \"Extreme swelling at site\"     Sulfur Hives        Social History     Tobacco Use    Smoking status: Former     Packs/day: 1.00     Years: 45.00     Pack years: 45.00     Types: Cigarettes     Quit date: 2014     Years since quittin.8    Smokeless tobacco: Never   Vaping Use    Vaping Use: Never used   Substance Use Topics    Alcohol use: No    Drug use: No        Family History   Problem Relation Age of Onset    Hypertension Mother     Diabetes Mother     Cancer Father     Stroke Father        Review of Systems    General: negative for fever   Eyes: negative for vision loss   HENT: negative for cold symptoms   Respiratory negative for shortness of breath   Cardiac: negative for chest pain   Vascular negative for foot pain at night    Gastrointestinal: negative for abdominal pain   Genitourinary: negative for dysuria    Endocrine: negative for excessive thirst   Skin: negative for rash

## 2023-10-02 ENCOUNTER — HOSPITAL ENCOUNTER (OUTPATIENT)
Facility: HOSPITAL | Age: 73
Setting detail: RECURRING SERIES
Discharge: HOME | End: 2023-10-05
Payer: MEDICARE

## 2023-10-02 VITALS — OXYGEN SATURATION: 96 %

## 2023-10-02 PROCEDURE — 94618 PULMONARY STRESS TESTING: CPT

## 2023-10-02 ASSESSMENT — EJECTION FRACTION: EF_VALUE: 60

## 2023-10-02 ASSESSMENT — PATIENT HEALTH QUESTIONNAIRE - PHQ9
SUM OF ALL RESPONSES TO PHQ QUESTIONS 1-9: 7
4. FEELING TIRED OR HAVING LITTLE ENERGY: 3
SUM OF ALL RESPONSES TO PHQ QUESTIONS 1-9: 7
7. TROUBLE CONCENTRATING ON THINGS, SUCH AS READING THE NEWSPAPER OR WATCHING TELEVISION: 0
9. THOUGHTS THAT YOU WOULD BE BETTER OFF DEAD, OR OF HURTING YOURSELF: 0
SUM OF ALL RESPONSES TO PHQ QUESTIONS 1-9: 7
SUM OF ALL RESPONSES TO PHQ9 QUESTIONS 1 & 2: 0
3. TROUBLE FALLING OR STAYING ASLEEP: 3
8. MOVING OR SPEAKING SO SLOWLY THAT OTHER PEOPLE COULD HAVE NOTICED. OR THE OPPOSITE, BEING SO FIGETY OR RESTLESS THAT YOU HAVE BEEN MOVING AROUND A LOT MORE THAN USUAL: 0
10. IF YOU CHECKED OFF ANY PROBLEMS, HOW DIFFICULT HAVE THESE PROBLEMS MADE IT FOR YOU TO DO YOUR WORK, TAKE CARE OF THINGS AT HOME, OR GET ALONG WITH OTHER PEOPLE: 1
6. FEELING BAD ABOUT YOURSELF - OR THAT YOU ARE A FAILURE OR HAVE LET YOURSELF OR YOUR FAMILY DOWN: 0
5. POOR APPETITE OR OVEREATING: 1
SUM OF ALL RESPONSES TO PHQ QUESTIONS 1-9: 7
1. LITTLE INTEREST OR PLEASURE IN DOING THINGS: 0
2. FEELING DOWN, DEPRESSED OR HOPELESS: 0

## 2023-10-02 ASSESSMENT — EXERCISE STRESS TEST
PEAK_RPE: 13
PEAK_METS: 2.11
PEAK_BP: 151/88
PEAK_HR: 64

## 2023-10-02 ASSESSMENT — LIFESTYLE VARIABLES: SMOKELESS_TOBACCO: NO

## 2023-10-02 NOTE — PROGRESS NOTES
Haylie Quinonez #[id number] (HQW:[id number]) (7 y.o.  F) (Adm: 10/02/23)  [facility]  Cardiac ITP    Flowsheet Row CARDIO PULMONARY REHAB from 10/2/2023 in [facility]   Treatment Diagnosis    Treatment Diagnosis 1 COPD   Treatment Diagnosis 2 --   Referral Date 08/15/23   Significant Cardiovascular History --   Co-morbidities Pulmonary disease   Oxygen Saturation / Titration     Stages of Change  Action   Oxygen Intervention    Oxygen Use Yes   Oxygen Type  Nasal canula   Patients Liter Flow  2  [At night]   O2 Sat Greater Than 90% Yes   Nurse/Patient Discussion  yes   Oxygen Education  Oxygen Safety / [place]aza[place], Traveling with Oxygen, Types of Oxygen, Proper care of Oxygen Equipment, Emergency Oxygen useage   Oxygen Target Goals  Discontinue oxygen useage, Understand use of oxygen, Decrease liters required, Use oxygen as needed, Keep SA02 greater than 90%   Patient Stated Goals  --   Individual Treatment Plan    ITP Visit Type Initial assessment   1st Date of Exercise 10/02/23   ITP Next Review Date 10/30/23   Visit #/Total Visits 1/36   EF% 60 %   FVC (Liters) --   FEV1 (%PRED) --   FEV1/FVC --   DLCO (mL/mmHg sec) --   Risk Stratification Low   Pulmonary ITP Exercise   Exercise    Collins Total Score 0   Stages of Change Preparation   Exercise Test Six minute walk test   Distance Walked in  --   [employer] (ft) 765 ft   [employer] (miles) --   [employer] in Feet (Calculated) --   Distance Walked in Meters --   Peak HR 64   Peak /88   Peak RPE 13   Peak Mets 2.11   O2 Saturation 96   O2 Flow Rate (l/min) 0 l/min   Stops 0   SPO2 Range 90-96   DASI Total Score --   BMI (Calculated) --   FEV1 (%PRED) --   SRIRAM Total Score --   Exercise Prescription    Mode Treadmill, Bike   Frequency per week 2/week   Duration Per Session 60   Intensity METS       2.11   Progression slow   SpO2 96 %   O2 Device Room air   O2 Flow Rate (l/min) 0 l/min   FIO2 (%) --   O2 Temperature --   Comments --   Symptoms

## 2023-10-09 ENCOUNTER — HOSPITAL ENCOUNTER (OUTPATIENT)
Facility: HOSPITAL | Age: 73
Setting detail: RECURRING SERIES
Discharge: HOME | End: 2023-10-12
Payer: MEDICARE

## 2023-10-09 PROCEDURE — G0239 OTH RESP PROC, GROUP: HCPCS

## 2023-10-09 PROCEDURE — G0237 THERAPEUTIC PROCD STRG ENDUR: HCPCS

## 2023-10-09 NOTE — PROGRESS NOTES
Pulmonary/RT Note    Visit #        2       Claudette Henry is a 75 y.o. female presented today for pulmonary rehab. She state that there have been no changes in medication or health since the last visit. She has a target heart rate of 107-125. She tolerated the exercise session well and has a pain level of 3. Patient states RPE for session today was 14 and RPD was a 3.  Today the pt did:      Nustep:0 min  Arm bike: 0 min  Walkin min  Resistance bands:0  min   Breathing retrainin min  Bodyweight:  0 min  Bike: 15 min  Education: 5 min             Physician available for emergencies: Dr. Janette Hernandez, RT 10/9/2023 11:48 AM

## 2023-10-13 ENCOUNTER — HOSPITAL ENCOUNTER (OUTPATIENT)
Facility: HOSPITAL | Age: 73
Setting detail: RECURRING SERIES
Discharge: HOME | End: 2023-10-16
Payer: MEDICARE

## 2023-10-13 PROCEDURE — G0237 THERAPEUTIC PROCD STRG ENDUR: HCPCS

## 2023-10-13 PROCEDURE — G0239 OTH RESP PROC, GROUP: HCPCS

## 2023-10-13 NOTE — PROGRESS NOTES
Pulmonary/RT Note    Visit #     3          Estuardo Marie is a 75 y.o. female presented today for pulmonary rehab. She state that there have been no changes in medication or health since the last visit. She has a target heart rate of 107-125. She tolerated the exercise session well and has a pain level of 3. Patient states RPE for session today was 13 and RPD was a 2.  Today the pt did:      Nustep:0 min  Arm bike:  0 min  Walkin min  Resistance bands:  0 min   Breathing retraining:15 min  Bodyweight: 5 min ( sit and stand)  Education: 5 min  Bike: 15 min               Physician available for emergencies: Dr. Hailey Canela, RT 10/13/2023 12:13 PM

## 2023-10-16 ENCOUNTER — HOSPITAL ENCOUNTER (OUTPATIENT)
Facility: HOSPITAL | Age: 73
Setting detail: RECURRING SERIES
Discharge: HOME | End: 2023-10-19
Payer: MEDICARE

## 2023-10-16 PROCEDURE — G0239 OTH RESP PROC, GROUP: HCPCS

## 2023-10-16 PROCEDURE — G0237 THERAPEUTIC PROCD STRG ENDUR: HCPCS

## 2023-10-16 NOTE — PROGRESS NOTES
Pulmonary/RT Note    Visit #               Morales Phipps is a 75 y.o. female presented today for pulmonary rehab. She state that there have been no changes in medication or health since the last visit. She has a target heart rate of 107-125. She tolerated the exercise session well and has a pain level of 6( knee) . Patient states RPE for session today was 14 and RPD was a 3.  Today the pt did:      Nustep:0 min  Arm bike: 15 min  Walkin min  Resistance bands: 0 min   Breathing retraining: 15 min  Bodyweight: 0 min             Physician available for emergencies: RT Celine 10/16/2023 11:46 AM

## 2023-10-20 ENCOUNTER — APPOINTMENT (OUTPATIENT)
Facility: HOSPITAL | Age: 73
End: 2023-10-20
Payer: MEDICARE

## 2023-10-23 ENCOUNTER — APPOINTMENT (OUTPATIENT)
Facility: HOSPITAL | Age: 73
End: 2023-10-23
Payer: MEDICARE

## 2023-10-27 ENCOUNTER — HOSPITAL ENCOUNTER (OUTPATIENT)
Facility: HOSPITAL | Age: 73
Setting detail: RECURRING SERIES
Discharge: HOME | End: 2023-10-30
Payer: MEDICARE

## 2023-10-27 PROCEDURE — G0237 THERAPEUTIC PROCD STRG ENDUR: HCPCS

## 2023-10-27 NOTE — PROGRESS NOTES
Pulmonary/RT Note    Visit #               Danielle Plasencia is a 75 y.o. female presented today for pulmonary rehab. She state that there have been no changes in medication or health since the last visit. She has a target heart rate of 107-125. She tolerated the exercise session well and has a pain level of 3. Patient states RPE for session today was 14 and RPD was a 2.  Today the pt did:      Nustep: 0 min  Arm bike: 0 min  Walkin min  Resistance bands:  0 min   Breathing retraining:15 min  Bodyweight:  0 min  Bike: 15 min             Physician available for emergencies: Dr. Vibha Renee, RT 10/27/2023 10:51 AM

## 2023-10-30 ENCOUNTER — HOSPITAL ENCOUNTER (OUTPATIENT)
Facility: HOSPITAL | Age: 73
Setting detail: RECURRING SERIES
Discharge: HOME | End: 2023-11-02
Payer: MEDICARE

## 2023-10-30 VITALS — OXYGEN SATURATION: 96 %

## 2023-10-30 PROCEDURE — G0237 THERAPEUTIC PROCD STRG ENDUR: HCPCS

## 2023-10-30 ASSESSMENT — LIFESTYLE VARIABLES: SMOKELESS_TOBACCO: NO

## 2023-10-30 ASSESSMENT — EXERCISE STRESS TEST
PEAK_RPE: 13
PEAK_HR: 64
PEAK_BP: 137/78
PEAK_METS: 2.11
PEAK_BP: 151/88

## 2023-10-30 ASSESSMENT — EJECTION FRACTION: EF_VALUE: 60

## 2023-10-30 NOTE — PROGRESS NOTES
Pulmonary/RT Note    Visit #               Elgin Lee is a 75 y.o. female presented today for pulmonary rehab. She state that there have been no changes in medication or health since the last visit. She has a target heart rate of 107-125. She tolerated the exercise session well and has a pain level of 4. Patient states RPE for session today was 13 and RPD was a 2.  Today the pt did:      Nustep:0 min  Arm bike: 0 min  Walkin min  Resistance bands:  0 min   Breathing retraining:15 min  Bodyweight:0  min  Bike: 17 min             Physician available for emergencies:Dr. Sheila Hernandez, RT 10/30/2023 10:42 AM
Blood pressure medications, Energy conservation, Equipment orientation, Exercise safety, Low sodium diet, Home exercise plan, O2 therapy, Purse lip/abdominal breathing, Physically active, RPE scale, RPE/RPD scale, Self pulse, Signs/symptoms to report, Understand blood pressure, Warm up/cool down -- --   Exercise Target Group       Target Goal(s) Individual exercise RX, BP < 140/90 or < 130/80, if DM or CKD, SaO2>89%, Home activity, Aerobic activity 30 + minutes/day 5 days/week Individual exercise RX, BP < 140/90 or < 130/80, if DM or CKD, SaO2>89%, Home activity, Aerobic activity 30 + minutes/day 5 days/week -- --   Patient Stated Exercise Goals -- -- -- --   Psychosocial       Stages of Change Preparation Preparation -- --   Family Support Yes Yes -- --   [facility] Total Score -- -- -- --   Comments -- -- -- --   Psychosocial Intervention       Interventions -- PCP notified -- --   Consults -- -- -- --   Resources Provided -- -- -- --   Currently Taking Psychotropic Meds No No -- --   Medication Changes -- -- -- --   Psychosocial Education       Education Advanced directives, Benefits of CPR completion, Cardiac meds, Environmental triggers, Impact self care behaviors on health, Coping techniques, Relaxation techniques, Signs/symptoms of depression, Stress management class, Support groups, Tobacco dangers, Traveling with lung disease Advanced directives, Benefits of CPR completion, Cardiac meds, Environmental triggers, Impact self care behaviors on health, Coping techniques, Relaxation techniques, Signs/symptoms of depression, Stress management class, Support groups, Tobacco dangers, Traveling with lung disease -- --   Psychosocial Target Goals       Target Goal(s) Demonstrates appropriate interaction with others, Assess presence or absence of depression using a valid screening tool, Engages in self-care behaviors, Maximizes coping skills, Positive support group Demonstrates appropriate interaction with others,

## 2023-11-03 ENCOUNTER — HOSPITAL ENCOUNTER (OUTPATIENT)
Facility: HOSPITAL | Age: 73
Setting detail: RECURRING SERIES
Discharge: HOME | End: 2023-11-06
Payer: MEDICARE

## 2023-11-03 PROCEDURE — G0237 THERAPEUTIC PROCD STRG ENDUR: HCPCS

## 2023-11-03 NOTE — PROGRESS NOTES
Pulmonary/RT Note    Visit #               Loida Ramirez is a 75 y.o. female presented today for pulmonary rehab. She state that there have been no changes in medication or health since the last visit. She has a target heart rate of 107-125. She tolerated the exercise session well and has a pain level of 2. Patient states RPE for session today was 13 and RPD was a 2.  Today the pt did:      Nustep: 0 min  Arm bike:  0 min  Walkin min  Resistance bands: 0  min   Breathing retraining: 15 min  Bodyweight:  0 min  Bike: 15 min                      RT Tomasa 11/3/2023 10:49 AM

## 2023-11-06 ENCOUNTER — HOSPITAL ENCOUNTER (OUTPATIENT)
Facility: HOSPITAL | Age: 73
Setting detail: RECURRING SERIES
Discharge: HOME | End: 2023-11-09
Payer: MEDICARE

## 2023-11-06 PROCEDURE — G0237 THERAPEUTIC PROCD STRG ENDUR: HCPCS

## 2023-11-06 PROCEDURE — G0239 OTH RESP PROC, GROUP: HCPCS

## 2023-11-06 ASSESSMENT — PATIENT HEALTH QUESTIONNAIRE - PHQ9
4. FEELING TIRED OR HAVING LITTLE ENERGY: 2
2. FEELING DOWN, DEPRESSED OR HOPELESS: 0
5. POOR APPETITE OR OVEREATING: 0
3. TROUBLE FALLING OR STAYING ASLEEP: 2
1. LITTLE INTEREST OR PLEASURE IN DOING THINGS: 0
SUM OF ALL RESPONSES TO PHQ QUESTIONS 1-9: 4
10. IF YOU CHECKED OFF ANY PROBLEMS, HOW DIFFICULT HAVE THESE PROBLEMS MADE IT FOR YOU TO DO YOUR WORK, TAKE CARE OF THINGS AT HOME, OR GET ALONG WITH OTHER PEOPLE: 0
6. FEELING BAD ABOUT YOURSELF - OR THAT YOU ARE A FAILURE OR HAVE LET YOURSELF OR YOUR FAMILY DOWN: 0
8. MOVING OR SPEAKING SO SLOWLY THAT OTHER PEOPLE COULD HAVE NOTICED. OR THE OPPOSITE, BEING SO FIGETY OR RESTLESS THAT YOU HAVE BEEN MOVING AROUND A LOT MORE THAN USUAL: 0
7. TROUBLE CONCENTRATING ON THINGS, SUCH AS READING THE NEWSPAPER OR WATCHING TELEVISION: 0
9. THOUGHTS THAT YOU WOULD BE BETTER OFF DEAD, OR OF HURTING YOURSELF: 0
SUM OF ALL RESPONSES TO PHQ QUESTIONS 1-9: 4
SUM OF ALL RESPONSES TO PHQ9 QUESTIONS 1 & 2: 0
SUM OF ALL RESPONSES TO PHQ QUESTIONS 1-9: 4
SUM OF ALL RESPONSES TO PHQ QUESTIONS 1-9: 4

## 2023-11-06 NOTE — PROGRESS NOTES
Pulmonary/RT Note    Visit #        8/36       Chayito Hernández is a 75 y.o. female presented today for pulmonary rehab. She state that there have been no changes in medication or health since the last visit. She has a target heart rate of 107-125. She tolerated the exercise session well and has a pain level of 2 (back). Patient states RPE for session today was 14 and RPD was a 3.  Today the pt did:      Nustep:0 min  Arm bike:  0 min  Walking:  10 min  Resistance bands: 0  min   Breathing retraining:15 min  Bodyweight:  0 min  Bike:15 min             Physician available for emergencies: Dr. Roger Livingston, RT 11/6/2023 11:27 AM

## 2023-11-10 ENCOUNTER — HOSPITAL ENCOUNTER (OUTPATIENT)
Facility: HOSPITAL | Age: 73
Setting detail: RECURRING SERIES
Discharge: HOME | End: 2023-11-13
Payer: MEDICARE

## 2023-11-10 PROCEDURE — G0237 THERAPEUTIC PROCD STRG ENDUR: HCPCS

## 2023-11-10 NOTE — PROGRESS NOTES
Pulmonary/RT Note    Visit #               Barbara Umana is a 75 y.o. female presented today for pulmonary rehab. She state that there have been no changes in medication or health since the last visit. She has a target heart rate of 107-125. She tolerated the exercise session well and has a pain level of 4. Patient states RPE for session today was 13 and RPD was a 2.  Today the pt did:      Nustep: 0 min  Arm bike:  0 min  Walkin min  Resistance bands:  0 min   Breathing retraining: 15 min  Bodyweight:  0 min  Bike: 15 min             Physician available for emergencies: Dr. Trenna Ganser, RT 11/10/2023 11:11 AM

## 2023-11-13 ENCOUNTER — HOSPITAL ENCOUNTER (OUTPATIENT)
Facility: HOSPITAL | Age: 73
Setting detail: RECURRING SERIES
Discharge: HOME | End: 2023-11-16
Payer: MEDICARE

## 2023-11-13 PROCEDURE — G0237 THERAPEUTIC PROCD STRG ENDUR: HCPCS

## 2023-11-13 NOTE — PROGRESS NOTES
Pulmonary/RT Note    Visit #        10/36       Chayito Hernández is a 75 y.o. female presented today for pulmonary rehab. She state that there have been no changes in medication or health since the last visit. She has a target heart rate of 107-125. She tolerated the exercise session well and has a pain level of 3-5 (chronic back). Patient states RPE for session today was 14 and RPD was a 3.  Today the pt did:      Nustep: 0 min  Arm bike:  0 min  Walkin min  Resistance bands: 0 min   Breathing retraining: 15 min  Bodyweight: 0 min  Bike: 15 min              Physician available for emergencies: Dr. Kayla Luna, RT 2023 1:55 PM

## 2023-11-17 ENCOUNTER — HOSPITAL ENCOUNTER (OUTPATIENT)
Facility: HOSPITAL | Age: 73
Setting detail: RECURRING SERIES
Discharge: HOME | End: 2023-11-20
Payer: MEDICARE

## 2023-11-17 PROCEDURE — G0237 THERAPEUTIC PROCD STRG ENDUR: HCPCS

## 2023-11-17 PROCEDURE — G0239 OTH RESP PROC, GROUP: HCPCS

## 2023-11-17 NOTE — PROGRESS NOTES
Pulmonary/RT Note    Visit #        11/36       Mahi Roca is a 75 y.o. female presented today for pulmonary rehab. She state that there have been no changes in medication or health since the last visit. She has a target heart rate of 107-125. She tolerated the exercise session well and has a pain level of 3. Patient states RPE for session today was 13 and RPD was a 1.  Today the pt did:      Nustep:0 min  Arm bike:  0 min  Walking: 15 min  Resistance bands:  0 min   Breathing retraining:15 min  Bodyweight:  0 min  Bike: 15 min             Physician available for emergencies: Dr. Tracey Bolden, RT 11/17/2023 11:00 AM

## 2023-11-20 ENCOUNTER — HOSPITAL ENCOUNTER (OUTPATIENT)
Facility: HOSPITAL | Age: 73
Setting detail: RECURRING SERIES
Discharge: HOME | End: 2023-11-23
Payer: MEDICARE

## 2023-11-20 DIAGNOSIS — I71.23 ANEURYSM OF DESCENDING THORACIC AORTA WITHOUT RUPTURE (HCC): ICD-10-CM

## 2023-11-20 PROCEDURE — G0237 THERAPEUTIC PROCD STRG ENDUR: HCPCS

## 2023-11-20 NOTE — PROGRESS NOTES
Pulmonary/RT Note    Visit #      12/36         Palmira Anderson is a 75 y.o. female presented today for pulmonary rehab. She state that there have been no changes in medication or health since the last visit. She has a target heart rate of 107-125. She tolerated the exercise session well and has a pain level of 2-3 in her knees. Patient states RPE for session today was 14 and RPD was a 3.  Today the pt did:        Walking:  15 min  Recumbent bike: 15 min  Breathing retraining: 15 min             Physician available for emergencies: Dr. Beatriz Hawkins, TRAY 11/20/2023 10:14 AM

## 2023-11-24 ENCOUNTER — APPOINTMENT (OUTPATIENT)
Facility: HOSPITAL | Age: 73
End: 2023-11-24
Payer: MEDICARE

## 2023-11-27 ENCOUNTER — HOSPITAL ENCOUNTER (OUTPATIENT)
Facility: HOSPITAL | Age: 73
Setting detail: RECURRING SERIES
Discharge: HOME | End: 2023-11-30
Payer: MEDICARE

## 2023-11-27 PROCEDURE — G0237 THERAPEUTIC PROCD STRG ENDUR: HCPCS

## 2023-11-29 VITALS — OXYGEN SATURATION: 96 %

## 2023-11-29 ASSESSMENT — LIFESTYLE VARIABLES: SMOKELESS_TOBACCO: NO

## 2023-11-29 ASSESSMENT — EXERCISE STRESS TEST
PEAK_METS: 2.11
PEAK_HR: 64
PEAK_RPE: 13
PEAK_BP: 151/88
PEAK_BP: 136/69

## 2023-11-29 ASSESSMENT — EJECTION FRACTION: EF_VALUE: 60

## 2023-11-29 NOTE — PROGRESS NOTES
Pulmonary/RT Note    Visit #        13/36       Bruce Singer is a 75 y.o. female presented today for pulmonary rehab. She state that there have been no changes in medication or health since the last visit. She has a target heart rate of 107-125. She tolerated the exercise session well and has a pain level of 2. Patient states RPE for session today was 13 and RPD was a 2.  Today the pt did:      Nustep: 0 min  Arm bike:  0 min  Walking:  15 min  Resistance bands: 0 min   Breathing retraining: 15 min  Bodyweight:  0 min  Bike: 15 min             Physician available for emergencies: Dr. Meredith Polk, RT 11/27/2023 11:02 AM
disease, Heart/lung association, Med compliance, Preventing infection, Nebulizer use, Pulmonary medications, Pulmonary A&P, lung/gas exchange, Signs/symptoms of angina, Signs/symptoms of hypo/hyperglycemia, Other (comment), Tobacco triggers   Comments --   Education Target Goals    Target Goals Correct demonstration of breathing techniques, Correct demonstration of MDI use and care, Correct demonstration/verbalization of O2 therapy   Patient Stated Education Goals --   Physician Response    MD Response --   Add or Change to Rehab Plan --   MD Signature --     Goals Comments   1.  Improve work of breathing  while walking and carrying items within 30-90 days    [] initial  [] met                             [] not met  [x] progressing       MD Signature: _______________________________________________        Date/Time:  _______________________________        Pharmacist Signature:_________________________________________        Date/Time: _______________________________

## 2023-12-01 ENCOUNTER — APPOINTMENT (OUTPATIENT)
Facility: HOSPITAL | Age: 73
End: 2023-12-01
Payer: MEDICARE

## 2023-12-04 ENCOUNTER — HOSPITAL ENCOUNTER (OUTPATIENT)
Facility: HOSPITAL | Age: 73
Setting detail: RECURRING SERIES
Discharge: HOME | End: 2023-12-07
Payer: MEDICARE

## 2023-12-04 PROCEDURE — G0239 OTH RESP PROC, GROUP: HCPCS

## 2023-12-04 PROCEDURE — G0237 THERAPEUTIC PROCD STRG ENDUR: HCPCS

## 2023-12-04 NOTE — PROGRESS NOTES
Pulmonary/RT Note    Visit #        14/36       Benton Hand is a 75 y.o. female presented today for pulmonary rehab. She state that there have been no changes in medication or health since the last visit. She has a target heart rate of 107-125. She tolerated the exercise session well and has a pain level of 2. Patient states RPE for session today was 13 and RPD was a 2.  Today the pt did:      Nustep: 0 min  Arm bike:0   min  Walking:  15 min  Resistance bands: 0 min   Breathing retraining: 15 min  Bodyweight: 0 min  Bike: 15 min           Physician available for emergencies: Dr. Parris Dean, RT 12/4/2023 11:19 AM

## 2023-12-08 ENCOUNTER — APPOINTMENT (OUTPATIENT)
Facility: HOSPITAL | Age: 73
End: 2023-12-08
Payer: MEDICARE

## 2023-12-11 ENCOUNTER — HOSPITAL ENCOUNTER (OUTPATIENT)
Facility: HOSPITAL | Age: 73
Setting detail: RECURRING SERIES
Discharge: HOME | End: 2023-12-14
Payer: MEDICARE

## 2023-12-11 PROCEDURE — G0237 THERAPEUTIC PROCD STRG ENDUR: HCPCS

## 2023-12-11 NOTE — PROGRESS NOTES
Pulmonary/RT Note    Visit #     15/36          Timoteo Taylor is a 75 y.o. female presented today for pulmonary rehab. She state that there have been no changes in medication or health since the last visit. She has a target heart rate of 107-125. She tolerated the exercise session well and has a pain level of 3. Patient states RPE for session today was 13 and RPD was a 2.  Today the pt did:      Nustep: 0 min  Arm bike: 0  min  Walking:  15 min  Resistance bands: 0 min   Breathing retraining: 15 min  Bodyweight: 0 min             Physician available for emergencies: Dr. Apryl Purcell, RT 12/11/2023 10:27 AM

## 2023-12-22 ENCOUNTER — APPOINTMENT (OUTPATIENT)
Facility: HOSPITAL | Age: 73
End: 2023-12-22
Payer: MEDICARE

## 2023-12-27 ENCOUNTER — HOSPITAL ENCOUNTER (OUTPATIENT)
Facility: HOSPITAL | Age: 73
Setting detail: RECURRING SERIES
Discharge: HOME | End: 2023-12-30
Payer: MEDICARE

## 2023-12-27 PROCEDURE — G0237 THERAPEUTIC PROCD STRG ENDUR: HCPCS

## 2023-12-27 NOTE — PROGRESS NOTES
Pulmonary/RT Note    Visit #               Dinora Pod is a 75 y.o. female presented today for pulmonary rehab. She state that there have been no changes in medication or health since the last visit. She has a target heart rate of 107-125. She tolerated the exercise session well and has a pain level of 7 today, she almost had an accident but she broke her fall. Her session was limited to her breathing retraining exercises. Today. her RPE for session today was 12 and RPD was a 0.  Today the pt did:      Breathing retrainin min               Physician available for emergencies: Dr. Yvette Kang RCP 2023 10:48 AM
breath   Target Heart Rate 107-125   Resistance Training No   Weight --   Reps --   Assisted Devices None   Exercise Blood Pressures    Resting /68   Peak /69   Is BP WDL Yes   Exercise Intervention    Type none   Frequency --   Duration --   Resistance Training --   Comments --   Exercise Education    Education Energy conservation, Blood pressure medications, Equipment orientation, Exercise safety, Home exercise plan, Purse lip/abdominal breathing, RPE scale, Physically active, RPE/RPD scale, Self pulse, Signs/symptoms to report, Understand blood pressure, Warm up/cool down, Other (comment)   Exercise Target Group    Target Goal(s) Individual exercise RX, BP < 140/90 or < 130/80, if DM or CKD, SaO2>89%, Home activity, Aerobic activity 30 + minutes/day 5 days/week   Patient Stated Exercise Goals --   Psychosocial    Stages of Change Preparation   Family Support Yes   [facility] Total Score --   Comments --   Psychosocial Intervention    Interventions PCP notified   Consults --   Resources Provided --   Currently Taking Psychotropic Meds No   Medication Changes --   Psychosocial Education    Education Advanced directives, Benefits of CPR completion, Cardiac meds, Environmental triggers, Impact self care behaviors on health, Coping techniques, Relaxation techniques, Signs/symptoms of depression, Stress management class, Support groups, Tobacco dangers, Traveling with lung disease   Psychosocial Target Goals    Target Goal(s) Demonstrates appropriate interaction with others, Assess presence or absence of depression using a valid screening tool, Engages in self-care behaviors, Maximizes coping skills, Positive support group   Uses Stress Mgmt Techniques --   Patient Stated Psychosocial Goals --   Tobacco    Stages of Change Maintenance   Tobacco Use No   Quit --   Date Started --   Date Quit --   Quit Date Set --   # Cigarette Smoked/Day --   Smokeless Tobacco Use No   Amount --   Tobacco Cessation

## 2023-12-29 ENCOUNTER — APPOINTMENT (OUTPATIENT)
Facility: HOSPITAL | Age: 73
End: 2023-12-29
Payer: MEDICARE

## 2024-01-05 ENCOUNTER — APPOINTMENT (OUTPATIENT)
Facility: HOSPITAL | Age: 74
End: 2024-01-05
Payer: MEDICARE

## 2024-01-08 ENCOUNTER — HOSPITAL ENCOUNTER (OUTPATIENT)
Facility: HOSPITAL | Age: 74
Setting detail: RECURRING SERIES
Discharge: HOME | End: 2024-01-11
Payer: MEDICARE

## 2024-01-08 PROCEDURE — G0237 THERAPEUTIC PROCD STRG ENDUR: HCPCS

## 2024-01-08 NOTE — PROGRESS NOTES
Pulmonary/RT Note    Visit #               Radha Grimes is a 73 y.o. female presented today for pulmonary rehab. She state that she is still on antibiotics for bronchitis. She also fell approximately 2 weeks ago and still experiencing knee pain.    She has a target heart rate of 107-125. She tolerated the exercise session well and has a pain level of 7. Patient states RPE for session today was 12 and RPD was a 2. Today the pt did:      Nustep:0 min  Arm bike: 0 min  Walkin min  Resistance bands:0   min   Breathing retrainin min  Bodyweight: 0 min  Bike: 5             Physician available for emergencies: Dr. Lobo Seals, RT 2024 10:54 AM

## 2024-01-12 ENCOUNTER — TRANSCRIBE ORDERS (OUTPATIENT)
Facility: HOSPITAL | Age: 74
End: 2024-01-12

## 2024-01-12 ENCOUNTER — APPOINTMENT (OUTPATIENT)
Facility: HOSPITAL | Age: 74
End: 2024-01-12
Payer: MEDICARE

## 2024-01-12 DIAGNOSIS — S80.01XA CONTUSION OF RIGHT KNEE, INITIAL ENCOUNTER: Primary | ICD-10-CM

## 2024-01-13 ENCOUNTER — HOSPITAL ENCOUNTER (OUTPATIENT)
Facility: HOSPITAL | Age: 74
End: 2024-01-13
Attending: ORTHOPAEDIC SURGERY
Payer: MEDICARE

## 2024-01-13 DIAGNOSIS — S80.01XA CONTUSION OF RIGHT KNEE, INITIAL ENCOUNTER: ICD-10-CM

## 2024-01-13 PROCEDURE — 73721 MRI JNT OF LWR EXTRE W/O DYE: CPT

## 2024-01-16 ENCOUNTER — OFFICE VISIT (OUTPATIENT)
Age: 74
End: 2024-01-16
Payer: MEDICARE

## 2024-01-16 VITALS
RESPIRATION RATE: 18 BRPM | BODY MASS INDEX: 32.17 KG/M2 | SYSTOLIC BLOOD PRESSURE: 116 MMHG | OXYGEN SATURATION: 93 % | HEART RATE: 64 BPM | TEMPERATURE: 97.4 F | HEIGHT: 62 IN | WEIGHT: 174.8 LBS | DIASTOLIC BLOOD PRESSURE: 57 MMHG

## 2024-01-16 DIAGNOSIS — G47.34 NOCTURNAL HYPOXEMIA: Primary | ICD-10-CM

## 2024-01-16 DIAGNOSIS — J44.89 BRONCHITIS WITH CHRONIC AIRWAY OBSTRUCTION: ICD-10-CM

## 2024-01-16 DIAGNOSIS — R91.1 INCIDENTAL LUNG NODULE, > 3MM AND < 8MM: ICD-10-CM

## 2024-01-16 PROCEDURE — 3074F SYST BP LT 130 MM HG: CPT | Performed by: INTERNAL MEDICINE

## 2024-01-16 PROCEDURE — G8427 DOCREV CUR MEDS BY ELIG CLIN: HCPCS | Performed by: INTERNAL MEDICINE

## 2024-01-16 PROCEDURE — 3023F SPIROM DOC REV: CPT | Performed by: INTERNAL MEDICINE

## 2024-01-16 PROCEDURE — 3017F COLORECTAL CA SCREEN DOC REV: CPT | Performed by: INTERNAL MEDICINE

## 2024-01-16 PROCEDURE — G8399 PT W/DXA RESULTS DOCUMENT: HCPCS | Performed by: INTERNAL MEDICINE

## 2024-01-16 PROCEDURE — 3078F DIAST BP <80 MM HG: CPT | Performed by: INTERNAL MEDICINE

## 2024-01-16 PROCEDURE — G8484 FLU IMMUNIZE NO ADMIN: HCPCS | Performed by: INTERNAL MEDICINE

## 2024-01-16 PROCEDURE — G8417 CALC BMI ABV UP PARAM F/U: HCPCS | Performed by: INTERNAL MEDICINE

## 2024-01-16 PROCEDURE — 1123F ACP DISCUSS/DSCN MKR DOCD: CPT | Performed by: INTERNAL MEDICINE

## 2024-01-16 PROCEDURE — 1090F PRES/ABSN URINE INCON ASSESS: CPT | Performed by: INTERNAL MEDICINE

## 2024-01-16 PROCEDURE — 1036F TOBACCO NON-USER: CPT | Performed by: INTERNAL MEDICINE

## 2024-01-16 PROCEDURE — 99214 OFFICE O/P EST MOD 30 MIN: CPT | Performed by: INTERNAL MEDICINE

## 2024-01-16 RX ORDER — MONTELUKAST SODIUM 10 MG/1
10 TABLET ORAL NIGHTLY
Qty: 30 TABLET | Refills: 5 | Status: SHIPPED | OUTPATIENT
Start: 2024-01-16

## 2024-01-16 RX ORDER — ALBUTEROL SULFATE 90 UG/1
2 AEROSOL, METERED RESPIRATORY (INHALATION) EVERY 6 HOURS PRN
Qty: 18 G | Refills: 3 | Status: SHIPPED | OUTPATIENT
Start: 2024-01-16

## 2024-01-16 NOTE — PROGRESS NOTES
[facility]   Pulmonary, Critical Care, and Sleep Medicine           Pulmonary Office visit.         Subjective:   Patient is a 73 y.o. female who is  here for follow-up .  Nocturnal hypoxemia- need for nocturnal Oxygen supplementation  Post-COVID dyspnea  Evaluation of an abnormal Ct scan of chest.      01/16/24     CC: Continued symptoms of shortness of breath with activity-patient reports finding it difficult to climb stairs, walk certain distances and even carry out some activities of daily living especially bending or lifting.  She was referred to pulmonary rehab which she started in October but has not been able to attend regularly due to intermittent episodes where she got sick, the holidays and more recently suffering rotator cuff injury and knee getting twisted.  She however tries her best to attend the sessions and it does help which she is able to exercise  She was prescribed Spiriva Respimat but she stopped using it due to the cost-she had noted some improvement when she was using it  She has albuterol which she uses infrequently    Using night time O2  Had titration study completed- results suggest nocturnal Oxygen desaturation despite CPAP 9 cm. Needs 2 L Oxygen in addition.   She had a mandibular advancement device but is not wearing it now after she had tooth work done  On Singulair, flonase  She also has h/o allergies and is receiving allergy immunotherapy by Dr. Jimenez.   She had a LDCT ordered by her PCP which she completed in July 2023-noted to have findings of a lung nodule with recommendations for follow-up       HPI:   Followed from 7/2014 To 10/2014.    Most recent follow up Ct scan 7/2015 with stability in nodule.   She had 24 month follow up CT completed and is here to discuss results.   She also had follow up Sleep evaluation- Dr. RIVERA Barcenas who reports nocturnal hypoxemia on initial study and follow up titration study.   Patient was asked to follow up with

## 2024-01-16 NOTE — PROGRESS NOTES
Radha Grimes presents today for   Chief Complaint   Patient presents with    Follow-up    Abnormal CT    Shortness of Breath       Is someone accompanying this pt? No    Is the patient using any DME equipment during OV? No    -DME Company NA    Depression Screenin/6/2023    11:00 AM   PHQ-9 Questionaire   Little interest or pleasure in doing things 0   Feeling down, depressed, or hopeless 0   Trouble falling or staying asleep, or sleeping too much 2   Feeling tired or having little energy 2   Poor appetite or overeating 0   Feeling bad about yourself - or that you are a failure or have let yourself or your family down 0   Trouble concentrating on things, such as reading the newspaper or watching television 0   Moving or speaking so slowly that other people could have noticed. Or the opposite - being so fidgety or restless that you have been moving around a lot more than usual 0   Thoughts that you would be better off dead, or of hurting yourself in some way 0   PHQ-9 Total Score 4   If you checked off any problems, how difficult have these problems made it for you to do your work, take care of things at home, or get along with other people? 0       Learning Needs Questionnaire:     No question data found.      Fall Risk:         2023     9:56 AM 2023    10:29 AM   Fall Risk   2 or more falls in past year? no no   Fall with injury in past year? no no        Abuse Screening:          No data to display                  Coordination of Care:    1. Have you been to the ER, urgent care clinic since your last visit? Hospitalized since your last visit? No    2. Have you seen or consulted any other health care providers outside of the [facility] since your last visit? Include any pap smears or colon screening. No    Medication list has been update per patient.

## 2024-01-19 ENCOUNTER — APPOINTMENT (OUTPATIENT)
Facility: HOSPITAL | Age: 74
End: 2024-01-19
Payer: MEDICARE

## 2024-01-22 ENCOUNTER — APPOINTMENT (OUTPATIENT)
Facility: HOSPITAL | Age: 74
End: 2024-01-22
Payer: MEDICARE

## 2024-01-26 ENCOUNTER — APPOINTMENT (OUTPATIENT)
Facility: HOSPITAL | Age: 74
End: 2024-01-26
Payer: MEDICARE

## 2024-02-15 ENCOUNTER — TRANSCRIBE ORDERS (OUTPATIENT)
Facility: HOSPITAL | Age: 74
End: 2024-02-15

## 2024-02-15 DIAGNOSIS — Z12.31 SCREENING MAMMOGRAM FOR BREAST CANCER: Primary | ICD-10-CM

## 2024-02-23 ENCOUNTER — OFFICE VISIT (OUTPATIENT)
Age: 74
End: 2024-02-23
Payer: MEDICARE

## 2024-02-23 VITALS — HEIGHT: 61 IN | WEIGHT: 178 LBS | BODY MASS INDEX: 33.61 KG/M2

## 2024-02-23 DIAGNOSIS — J98.4 PULMONARY DISEASE: ICD-10-CM

## 2024-02-23 DIAGNOSIS — M17.11 OSTEOARTHRITIS OF RIGHT KNEE, UNSPECIFIED OSTEOARTHRITIS TYPE: ICD-10-CM

## 2024-02-23 DIAGNOSIS — I25.10 CORONARY ARTERY DISEASE INVOLVING NATIVE CORONARY ARTERY OF NATIVE HEART WITHOUT ANGINA PECTORIS: ICD-10-CM

## 2024-02-23 DIAGNOSIS — M25.561 RIGHT KNEE PAIN, UNSPECIFIED CHRONICITY: Primary | ICD-10-CM

## 2024-02-23 DIAGNOSIS — I10 HYPERTENSION, UNSPECIFIED TYPE: ICD-10-CM

## 2024-02-23 DIAGNOSIS — E78.00 HYPERCHOLESTEROLEMIA: ICD-10-CM

## 2024-02-23 DIAGNOSIS — Z01.818 PRE-OP TESTING: ICD-10-CM

## 2024-02-23 PROCEDURE — 3017F COLORECTAL CA SCREEN DOC REV: CPT | Performed by: ORTHOPAEDIC SURGERY

## 2024-02-23 PROCEDURE — G8484 FLU IMMUNIZE NO ADMIN: HCPCS | Performed by: ORTHOPAEDIC SURGERY

## 2024-02-23 PROCEDURE — 1090F PRES/ABSN URINE INCON ASSESS: CPT | Performed by: ORTHOPAEDIC SURGERY

## 2024-02-23 PROCEDURE — 99204 OFFICE O/P NEW MOD 45 MIN: CPT | Performed by: ORTHOPAEDIC SURGERY

## 2024-02-23 PROCEDURE — G8417 CALC BMI ABV UP PARAM F/U: HCPCS | Performed by: ORTHOPAEDIC SURGERY

## 2024-02-23 PROCEDURE — G8399 PT W/DXA RESULTS DOCUMENT: HCPCS | Performed by: ORTHOPAEDIC SURGERY

## 2024-02-23 PROCEDURE — G8427 DOCREV CUR MEDS BY ELIG CLIN: HCPCS | Performed by: ORTHOPAEDIC SURGERY

## 2024-02-23 PROCEDURE — 1123F ACP DISCUSS/DSCN MKR DOCD: CPT | Performed by: ORTHOPAEDIC SURGERY

## 2024-02-23 PROCEDURE — 1036F TOBACCO NON-USER: CPT | Performed by: ORTHOPAEDIC SURGERY

## 2024-02-23 NOTE — PROGRESS NOTES
agree with PFSH and ROS and intake form in chart and the record furthermore I have reviewed prior medical record(s) regarding this patients care during this appointment.     Review of Systems:   Patient is a pleasant appearing individual, appropriately dressed, well hydrated, well nourished, who is alert, appropriately oriented for age, and in no acute distress with a normal gait and normal affect who does not appear to be in any significant pain.    Physical Exam:  Right Knee -Decrease range of motion with flexion, Knee arc of greater than 50 degrees, Some crepitation, Grossly neurovascularly intact, Good cap refill, No skin lesion, Moderate swelling, some gross instability, Some quadriceps weakness, Kellgren and Mariusz at least grade 4    Left Knee - Full Range of Motion, No crepitation, Grossly neurovascularly intact, Good cap refill, No skin lesion, No swelling, No gross instability, No quadriceps weakness     Inpatient status: The patient has admitted to severe pain in the affected knee and due to such pain they are unable to complete activities of daily living at home and/or work on a regular basis where conservative treatments have failed. After extensive discussion with the patient, they have chosen to receive a total knee replacement with the expectation of inpatient procedure. Their dependent functional status (i.e. lack of capable support and safety at home, pain management, comorbities, or difficulty ambulating with assistive walking devices) would deem them a candidate for an inpatient stay. The patient acknowledges and understand the plan.    The risks of surgery were explained to the patient which include but not limited to infection, nerve injury, artery injury, tendon injury, poor result, poor wound healing, unforeseen incidence, bleeding, infection, nerve damage, failure to improve, worsening of symptoms, morbidity, and mortality risks were explained. All questions were answered. Patient was told

## 2024-02-28 ENCOUNTER — HOSPITAL ENCOUNTER (OUTPATIENT)
Facility: HOSPITAL | Age: 74
Discharge: HOME | End: 2024-03-02
Payer: MEDICARE

## 2024-02-28 LAB
ALBUMIN SERPL-MCNC: 4.3 G/DL (ref 3.4–5)
ALBUMIN/GLOB SERPL: 1.3 (ref 0.8–1.7)
ALP SERPL-CCNC: 119 U/L (ref 45–117)
ALT SERPL-CCNC: 26 U/L (ref 13–56)
ANION GAP SERPL CALC-SCNC: 9 MMOL/L (ref 3–18)
AST SERPL-CCNC: 36 U/L (ref 10–38)
BASOPHILS # BLD: 0.1 K/UL (ref 0–0.1)
BASOPHILS NFR BLD: 2 % (ref 0–2)
BILIRUB SERPL-MCNC: 0.7 MG/DL (ref 0.2–1)
BUN SERPL-MCNC: 10 MG/DL (ref 7–18)
BUN/CREAT SERPL: 9 (ref 12–20)
CALCIUM SERPL-MCNC: 9.3 MG/DL (ref 8.5–10.1)
CHLORIDE SERPL-SCNC: 104 MMOL/L (ref 100–111)
CHOLEST SERPL-MCNC: 138 MG/DL
CO2 SERPL-SCNC: 28 MMOL/L (ref 21–32)
CREAT SERPL-MCNC: 1.06 MG/DL (ref 0.6–1.3)
DIFFERENTIAL METHOD BLD: NORMAL
EOSINOPHIL # BLD: 0.3 K/UL (ref 0–0.4)
EOSINOPHIL NFR BLD: 5 % (ref 0–5)
ERYTHROCYTE [DISTWIDTH] IN BLOOD BY AUTOMATED COUNT: 13.7 % (ref 11.6–14.5)
GLOBULIN SER CALC-MCNC: 3.4 G/DL (ref 2–4)
GLUCOSE SERPL-MCNC: 88 MG/DL (ref 74–99)
HCT VFR BLD AUTO: 42.9 % (ref 35–45)
HDLC SERPL-MCNC: 62 MG/DL (ref 40–60)
HDLC SERPL: 2.2 (ref 0–5)
HGB BLD-MCNC: 14.2 G/DL (ref 12–16)
IMM GRANULOCYTES # BLD AUTO: 0 K/UL (ref 0–0.04)
IMM GRANULOCYTES NFR BLD AUTO: 0 % (ref 0–0.5)
LDLC SERPL CALC-MCNC: 42.4 MG/DL (ref 0–100)
LIPID PANEL: ABNORMAL
LYMPHOCYTES # BLD: 1.6 K/UL (ref 0.9–3.6)
LYMPHOCYTES NFR BLD: 29 % (ref 21–52)
MCH RBC QN AUTO: 32 PG (ref 24–34)
MCHC RBC AUTO-ENTMCNC: 33.1 G/DL (ref 31–37)
MCV RBC AUTO: 96.6 FL (ref 78–100)
MONOCYTES # BLD: 0.6 K/UL (ref 0.05–1.2)
MONOCYTES NFR BLD: 10 % (ref 3–10)
NEUTS SEG # BLD: 2.9 K/UL (ref 1.8–8)
NEUTS SEG NFR BLD: 54 % (ref 40–73)
NRBC # BLD: 0 K/UL (ref 0–0.01)
NRBC BLD-RTO: 0 PER 100 WBC
PLATELET # BLD AUTO: 201 K/UL (ref 135–420)
PMV BLD AUTO: 11.7 FL (ref 9.2–11.8)
POTASSIUM SERPL-SCNC: 4 MMOL/L (ref 3.5–5.5)
PROT SERPL-MCNC: 7.7 G/DL (ref 6.4–8.2)
RBC # BLD AUTO: 4.44 M/UL (ref 4.2–5.3)
SODIUM SERPL-SCNC: 141 MMOL/L (ref 136–145)
T4 FREE SERPL-MCNC: 1.1 NG/DL (ref 0.7–1.5)
TRIGL SERPL-MCNC: 168 MG/DL
TSH SERPL DL<=0.05 MIU/L-ACNC: 0.93 UIU/ML (ref 0.36–3.74)
VLDLC SERPL CALC-MCNC: 33.6 MG/DL
WBC # BLD AUTO: 5.4 K/UL (ref 4.6–13.2)

## 2024-02-28 PROCEDURE — 84439 ASSAY OF FREE THYROXINE: CPT

## 2024-02-28 PROCEDURE — 85025 COMPLETE CBC W/AUTO DIFF WBC: CPT

## 2024-02-28 PROCEDURE — 80061 LIPID PANEL: CPT

## 2024-02-28 PROCEDURE — 84443 ASSAY THYROID STIM HORMONE: CPT

## 2024-02-28 PROCEDURE — 36415 COLL VENOUS BLD VENIPUNCTURE: CPT

## 2024-02-28 PROCEDURE — 80053 COMPREHEN METABOLIC PANEL: CPT

## 2024-03-11 ENCOUNTER — HOSPITAL ENCOUNTER (OUTPATIENT)
Facility: HOSPITAL | Age: 74
Discharge: HOME | End: 2024-03-14
Attending: FAMILY MEDICINE
Payer: MEDICARE

## 2024-03-11 VITALS — HEIGHT: 62 IN | BODY MASS INDEX: 32.2 KG/M2 | WEIGHT: 175 LBS

## 2024-03-11 DIAGNOSIS — Z12.31 SCREENING MAMMOGRAM FOR BREAST CANCER: ICD-10-CM

## 2024-03-11 PROCEDURE — 77063 BREAST TOMOSYNTHESIS BI: CPT

## 2024-04-05 ENCOUNTER — TELEPHONE (OUTPATIENT)
Age: 74
End: 2024-04-05

## 2024-04-05 RX ORDER — MONTELUKAST SODIUM 10 MG/1
10 TABLET ORAL NIGHTLY
Qty: 30 TABLET | Refills: 5 | Status: SHIPPED | OUTPATIENT
Start: 2024-04-05

## 2024-04-08 RX ORDER — MONTELUKAST SODIUM 10 MG/1
10 TABLET ORAL NIGHTLY
Qty: 90 TABLET | Refills: 5 | OUTPATIENT
Start: 2024-04-08

## 2024-04-09 DIAGNOSIS — I71.43 INFRARENAL ABDOMINAL AORTIC ANEURYSM (AAA) WITHOUT RUPTURE (HCC): Primary | ICD-10-CM

## 2024-04-09 NOTE — PROGRESS NOTES
Order for CTA of chest with/ with out contrast placed per verbal order from JUDY Loja, added no contrast due to patient's allergy .

## 2024-04-10 ENCOUNTER — TELEPHONE (OUTPATIENT)
Age: 74
End: 2024-04-10

## 2024-04-10 NOTE — TELEPHONE ENCOUNTER
Called the patient back and advised that the order indicated in the notes no contrast. Pt will call back central scheduling and let them know.

## 2024-04-10 NOTE — TELEPHONE ENCOUNTER
----- Message from Kat Lee sent at 4/10/2024 11:48 AM EDT -----  Patient called about cta order, she asked that it be changed to wo contrast instead of with because she is allergic and she never does it with contrast please advise

## 2024-04-19 ENCOUNTER — HOSPITAL ENCOUNTER (OUTPATIENT)
Facility: HOSPITAL | Age: 74
End: 2024-04-19
Payer: MEDICARE

## 2024-04-19 ENCOUNTER — ANCILLARY ORDERS (OUTPATIENT)
Age: 74
End: 2024-04-19

## 2024-04-19 DIAGNOSIS — M25.561 RIGHT KNEE PAIN, UNSPECIFIED CHRONICITY: ICD-10-CM

## 2024-04-19 DIAGNOSIS — I71.43 INFRARENAL ABDOMINAL AORTIC ANEURYSM (AAA) WITHOUT RUPTURE (HCC): ICD-10-CM

## 2024-04-19 DIAGNOSIS — Z01.818 PRE-OP TESTING: ICD-10-CM

## 2024-04-19 DIAGNOSIS — I71.43 INFRARENAL ABDOMINAL AORTIC ANEURYSM (AAA) WITHOUT RUPTURE (HCC): Primary | ICD-10-CM

## 2024-04-19 DIAGNOSIS — M17.11 OSTEOARTHRITIS OF RIGHT KNEE, UNSPECIFIED OSTEOARTHRITIS TYPE: ICD-10-CM

## 2024-04-19 PROCEDURE — 71250 CT THORAX DX C-: CPT

## 2024-04-19 PROCEDURE — 71046 X-RAY EXAM CHEST 2 VIEWS: CPT

## 2024-04-26 ENCOUNTER — OFFICE VISIT (OUTPATIENT)
Age: 74
End: 2024-04-26

## 2024-04-26 VITALS
OXYGEN SATURATION: 94 % | DIASTOLIC BLOOD PRESSURE: 72 MMHG | HEART RATE: 72 BPM | HEIGHT: 62 IN | TEMPERATURE: 98.1 F | SYSTOLIC BLOOD PRESSURE: 139 MMHG | BODY MASS INDEX: 32.2 KG/M2 | RESPIRATION RATE: 12 BRPM | WEIGHT: 175 LBS

## 2024-04-26 DIAGNOSIS — Z87.891 PERSONAL HISTORY OF TOBACCO USE: ICD-10-CM

## 2024-04-26 DIAGNOSIS — G47.34 NOCTURNAL HYPOXEMIA: ICD-10-CM

## 2024-04-26 DIAGNOSIS — R91.8 LUNG NODULES: ICD-10-CM

## 2024-04-26 DIAGNOSIS — J84.10 PULMONARY FIBROSIS (HCC): ICD-10-CM

## 2024-04-26 DIAGNOSIS — J45.909 ASTHMA, UNSPECIFIED ASTHMA SEVERITY, UNSPECIFIED WHETHER COMPLICATED, UNSPECIFIED WHETHER PERSISTENT: Primary | ICD-10-CM

## 2024-04-26 ASSESSMENT — ENCOUNTER SYMPTOMS
EYE PAIN: 0
WHEEZING: 0
CHEST TIGHTNESS: 0
EYE REDNESS: 0
STRIDOR: 0
SORE THROAT: 0
NAUSEA: 0
COLOR CHANGE: 0
BLOOD IN STOOL: 0
CHOKING: 0
CONSTIPATION: 0
EYE DISCHARGE: 0
VOMITING: 0
ABDOMINAL PAIN: 0
EYE ITCHING: 0
SHORTNESS OF BREATH: 1
BACK PAIN: 0
TROUBLE SWALLOWING: 0
VOICE CHANGE: 0
DIARRHEA: 0
COUGH: 0
PHOTOPHOBIA: 0

## 2024-04-26 NOTE — PROGRESS NOTES
Radha Grimes presents today for   Chief Complaint   Patient presents with    Follow-up     Nocturnal Hypoxemia  Pulmonary HTN  Lung Nodule    Results     Spirometry done in office today  CT (Chest) done 24       Is someone accompanying this pt? No    Is the patient using any DME equipment during OV? Oxygen    -DME Company [employer]    Depression Screenin/6/2023    11:00 AM   PHQ-9 Questionaire   Little interest or pleasure in doing things 0   Feeling down, depressed, or hopeless 0   Trouble falling or staying asleep, or sleeping too much 2   Feeling tired or having little energy 2   Poor appetite or overeating 0   Feeling bad about yourself - or that you are a failure or have let yourself or your family down 0   Trouble concentrating on things, such as reading the newspaper or watching television 0   Moving or speaking so slowly that other people could have noticed. Or the opposite - being so fidgety or restless that you have been moving around a lot more than usual 0   Thoughts that you would be better off dead, or of hurting yourself in some way 0   PHQ-9 Total Score 4   If you checked off any problems, how difficult have these problems made it for you to do your work, take care of things at home, or get along with other people? 0       Learning Needs Questionnaire:     No question data found.      Fall Risk:         2023     9:56 AM 2023    10:29 AM   Fall Risk   2 or more falls in past year? no no   Fall with injury in past year? no no        Abuse Screening:          No data to display                  Coordination of Care:    1. Have you been to the ER, urgent care clinic since your last visit? Hospitalized since your last visit? No    2. Have you seen or consulted any other health care providers outside of the [facility] since your last visit? Include any pap smears or colon screening. PCP-    Medication list has been update per patient.        
       Thought Content: Thought content normal.         Judgment: Judgment normal.        CT Result (most recent):  CT CHEST WO CONTRAST 04/19/2024    Narrative  EXAM: CT OF THE CHEST WITHOUT IV CONTRAST.    INDICATION: History of lung nodule. Additional history of a descending thoracic  aortic aneurysm.    TECHNIQUE: CT of the chest without IV contrast. Sagittal and coronal  reformations obtained.    All CT scans at this facility are performed using dose optimization technique as  appropriate to a performed exam, to include automated exposure control,  adjustment of the mA and/or kV according to patient size (including appropriate  matching for site specific examination) or use of iterative reconstruction  technique.    IV contrast: None    COMPARISON: CT lung screening 7/3/2023. CTA chest abdomen pelvis 6/20/2022.    FINDINGS:  Visualized neck: Unremarkable    Lungs:  Unchanged 4 mm groundglass nodule in the right upper lobe (series 3; 14).  Additional scattered micronodules in the right upper lobe are unchanged in size  compared to prior.  Small linear foci of fibrosis/scarring in the right middle lobe, left upper  lobe, lingular lobe, and basilar lungs unchanged compared to prior.    Trachea and major bronchi: Unremarkable    Pleura: No pneumothorax seen.  No pleural effusions present.    Mediastinum: No adenopathy appreciated.  No fluid collection appreciated. No  mass lesion seen. The aorta is suboptimally evaluated in the absence of  intravascular contrast, however appears grossly similar in size compared to the  previous exam with a maximum dimension of 3.6 cm through the diaphragmatic antwan.  There is a moderate atherosclerotic burden within the coronary vasculature as  well as the descending thoracic aorta.  The pulmonary artery diameter measures up to 3 cm, similar to prior.    Axilla/Chest Wall: Unremarkable    Musculoskeletal: No acute pathology.    Visualized upper abdomen: Moderate atherosclerotic

## 2024-05-03 ENCOUNTER — OFFICE VISIT (OUTPATIENT)
Age: 74
End: 2024-05-03

## 2024-05-03 VITALS
HEIGHT: 62 IN | SYSTOLIC BLOOD PRESSURE: 128 MMHG | WEIGHT: 175 LBS | HEART RATE: 61 BPM | BODY MASS INDEX: 32.2 KG/M2 | DIASTOLIC BLOOD PRESSURE: 68 MMHG | OXYGEN SATURATION: 94 %

## 2024-05-03 DIAGNOSIS — I71.9 AORTIC ANEURYSM WITHOUT RUPTURE, UNSPECIFIED PORTION OF AORTA (HCC): ICD-10-CM

## 2024-05-03 DIAGNOSIS — I25.10 ATHEROSCLEROSIS OF NATIVE CORONARY ARTERY OF NATIVE HEART WITHOUT ANGINA PECTORIS: Primary | ICD-10-CM

## 2024-05-03 DIAGNOSIS — E78.00 PURE HYPERCHOLESTEROLEMIA, UNSPECIFIED: ICD-10-CM

## 2024-05-03 DIAGNOSIS — I10 ESSENTIAL (PRIMARY) HYPERTENSION: ICD-10-CM

## 2024-05-03 PROBLEM — I73.9 PERIPHERAL VASCULAR DISEASE (HCC): Status: ACTIVE | Noted: 2019-10-31

## 2024-05-03 PROBLEM — I13.10 HYPERTENSIVE HEART AND KIDNEY DISEASE: Status: ACTIVE | Noted: 2019-04-02

## 2024-05-03 PROBLEM — R06.02 SHORTNESS OF BREATH: Status: ACTIVE | Noted: 2024-05-03

## 2024-05-03 ASSESSMENT — ANXIETY QUESTIONNAIRES
2. NOT BEING ABLE TO STOP OR CONTROL WORRYING: NOT AT ALL
GAD7 TOTAL SCORE: 0
4. TROUBLE RELAXING: NOT AT ALL
3. WORRYING TOO MUCH ABOUT DIFFERENT THINGS: NOT AT ALL
5. BEING SO RESTLESS THAT IT IS HARD TO SIT STILL: NOT AT ALL
7. FEELING AFRAID AS IF SOMETHING AWFUL MIGHT HAPPEN: NOT AT ALL
1. FEELING NERVOUS, ANXIOUS, OR ON EDGE: NOT AT ALL
6. BECOMING EASILY ANNOYED OR IRRITABLE: NOT AT ALL

## 2024-05-03 ASSESSMENT — ENCOUNTER SYMPTOMS
VOMITING: 0
ABDOMINAL DISTENTION: 0
NAUSEA: 0
SHORTNESS OF BREATH: 1
COUGH: 0
ABDOMINAL PAIN: 0
SORE THROAT: 0

## 2024-05-03 ASSESSMENT — PATIENT HEALTH QUESTIONNAIRE - PHQ9
SUM OF ALL RESPONSES TO PHQ QUESTIONS 1-9: 0
SUM OF ALL RESPONSES TO PHQ QUESTIONS 1-9: 0
SUM OF ALL RESPONSES TO PHQ9 QUESTIONS 1 & 2: 0
1. LITTLE INTEREST OR PLEASURE IN DOING THINGS: NOT AT ALL
2. FEELING DOWN, DEPRESSED OR HOPELESS: NOT AT ALL
SUM OF ALL RESPONSES TO PHQ QUESTIONS 1-9: 0
SUM OF ALL RESPONSES TO PHQ QUESTIONS 1-9: 0

## 2024-05-03 NOTE — PROGRESS NOTES
Radha Grimes presents today for   Chief Complaint   Patient presents with    Follow-up     6 month    Procedure     5/22/24: right total knee replacement with YESENIA [place] at [facility]       Radha Grimes preferred language for health care discussion is english/other.    Is someone accompanying this pt? no    Is the patient using any DME equipment during OV? no    Depression Screening:  Depression: Not at risk (5/3/2024)    PHQ-2     PHQ-2 Score: 0        Learning Assessment:  Who is the primary learner? Patient    What is the preferred language for health care of the primary learner? ENGLISH    How does the primary learner prefer to learn new concepts? DEMONSTRATION    Answered By patient    Relationship to Learner SELF           Pt currently taking Anticoagulant therapy? no    Pt currently taking Antiplatelet therapy ? Aspirin 81 mg daily      Coordination of Care:  1. Have you been to the ER, urgent care clinic since your last visit? Hospitalized since your last visit? no    2. Have you seen or consulted any other health care providers outside of the [facility] since your last visit? Include any pap smears or colon screening. no

## 2024-05-03 NOTE — PROGRESS NOTES
05/03/24     Radha Grimes  is a 73 y.o. female     Chief Complaint   Patient presents with    Follow-up     6 month    Procedure     5/22/24: right total knee replacement with Dr. Austin [facility]       HPI  Patient presents for an add-on office visit.  She was initially referred here by her PCP to establish care with a local cardiologist.  She was previously seeing another cardiologist at [place].  She has a history of nonobstructive single-vessel coronary disease involving her mid RCA.  She last underwent a cardiac catheterization in June 2021 which demonstrated a 50 to 60% mid RCA lesion which was negative for ischemia on IFR.  She had widely patent vessels elsewhere.  She also has a history of a small distal descending thoracic aortic aneurysm measuring 3.8 x 3.7 cm on CTA in June 2022.    Patient was hospitalized at [facility] briefly in June 2022 for chest pain.  She was ruled out for myocardial infarction, had nonspecific EKG changes, so underwent noninvasive cardiac testing including an echocardiogram and a nuclear stress test.  Nuclear stress test was a normal and low risk study.  Her echocardiogram demonstrated preserved LV function, EF 55 to 60%, mild concentric LVH, no valvular heart disease and upper normal PA pressures.    She returns to the office today for preoperative cardiac evaluation prior undergoing a right total knee replacement which is scheduled for later this month.  She has not noted any major change in her activity level.  No chest pain.  No leg swelling, no orthopnea or PND.  She does have chronic shortness of breath with heavy activity which has not changed over the past year or 2.    Past Medical History:   Diagnosis Date    Arthritis     Chronic obstructive pulmonary disease (HCC)     Coronary artery disease involving native coronary artery of native heart without angina pectoris 06/2021    Nonobstructive CAD.  Mid RCA 50-60%, negative IFR.  Left main, LAD, left circumflex patent.    Descending

## 2024-05-08 DIAGNOSIS — Z96.651 STATUS POST TOTAL RIGHT KNEE REPLACEMENT: Primary | ICD-10-CM

## 2024-05-10 ENCOUNTER — HOSPITAL ENCOUNTER (OUTPATIENT)
Facility: HOSPITAL | Age: 74
End: 2024-05-10
Payer: MEDICARE

## 2024-05-10 LAB
ALBUMIN SERPL-MCNC: 4.1 G/DL (ref 3.4–5)
ALBUMIN/GLOB SERPL: 1.3 (ref 0.8–1.7)
ALP SERPL-CCNC: 128 U/L (ref 45–117)
ALT SERPL-CCNC: 26 U/L (ref 13–56)
ANION GAP SERPL CALC-SCNC: 6 MMOL/L (ref 3–18)
APTT PPP: 28.5 SEC (ref 23–36.4)
AST SERPL-CCNC: 33 U/L (ref 10–38)
BASOPHILS # BLD: 0.1 K/UL (ref 0–0.1)
BASOPHILS NFR BLD: 1 % (ref 0–2)
BILIRUB SERPL-MCNC: 1.1 MG/DL (ref 0.2–1)
BUN SERPL-MCNC: 14 MG/DL (ref 7–18)
BUN/CREAT SERPL: 13 (ref 12–20)
CALCIUM SERPL-MCNC: 9.4 MG/DL (ref 8.5–10.1)
CHLORIDE SERPL-SCNC: 101 MMOL/L (ref 100–111)
CO2 SERPL-SCNC: 30 MMOL/L (ref 21–32)
CREAT SERPL-MCNC: 1.09 MG/DL (ref 0.6–1.3)
DIFFERENTIAL METHOD BLD: ABNORMAL
EOSINOPHIL # BLD: 0.3 K/UL (ref 0–0.4)
EOSINOPHIL NFR BLD: 4 % (ref 0–5)
ERYTHROCYTE [DISTWIDTH] IN BLOOD BY AUTOMATED COUNT: 13.8 % (ref 11.6–14.5)
GLOBULIN SER CALC-MCNC: 3.2 G/DL (ref 2–4)
GLUCOSE SERPL-MCNC: 109 MG/DL (ref 74–99)
HCT VFR BLD AUTO: 40.6 % (ref 35–45)
HGB BLD-MCNC: 12.8 G/DL (ref 12–16)
IMM GRANULOCYTES # BLD AUTO: 0 K/UL (ref 0–0.04)
IMM GRANULOCYTES NFR BLD AUTO: 0 % (ref 0–0.5)
INR PPP: 1 (ref 0.9–1.1)
LYMPHOCYTES # BLD: 1.6 K/UL (ref 0.9–3.6)
LYMPHOCYTES NFR BLD: 24 % (ref 21–52)
MCH RBC QN AUTO: 30.8 PG (ref 24–34)
MCHC RBC AUTO-ENTMCNC: 31.5 G/DL (ref 31–37)
MCV RBC AUTO: 97.6 FL (ref 78–100)
MONOCYTES # BLD: 0.6 K/UL (ref 0.05–1.2)
MONOCYTES NFR BLD: 10 % (ref 3–10)
NEUTS SEG # BLD: 4.1 K/UL (ref 1.8–8)
NEUTS SEG NFR BLD: 61 % (ref 40–73)
NRBC # BLD: 0 K/UL (ref 0–0.01)
NRBC BLD-RTO: 0 PER 100 WBC
PLATELET # BLD AUTO: 216 K/UL (ref 135–420)
PMV BLD AUTO: 11.3 FL (ref 9.2–11.8)
POTASSIUM SERPL-SCNC: 4.3 MMOL/L (ref 3.5–5.5)
PROT SERPL-MCNC: 7.3 G/DL (ref 6.4–8.2)
PROTHROMBIN TIME: 13.4 SEC (ref 11.9–14.7)
RBC # BLD AUTO: 4.16 M/UL (ref 4.2–5.3)
SODIUM SERPL-SCNC: 137 MMOL/L (ref 136–145)
WBC # BLD AUTO: 6.7 K/UL (ref 4.6–13.2)

## 2024-05-10 PROCEDURE — 85730 THROMBOPLASTIN TIME PARTIAL: CPT

## 2024-05-10 PROCEDURE — 80053 COMPREHEN METABOLIC PANEL: CPT

## 2024-05-10 PROCEDURE — 85610 PROTHROMBIN TIME: CPT

## 2024-05-10 PROCEDURE — 36415 COLL VENOUS BLD VENIPUNCTURE: CPT

## 2024-05-10 PROCEDURE — 85025 COMPLETE CBC W/AUTO DIFF WBC: CPT

## 2024-05-17 ENCOUNTER — OFFICE VISIT (OUTPATIENT)
Age: 74
End: 2024-05-17

## 2024-05-17 ENCOUNTER — OFFICE VISIT (OUTPATIENT)
Age: 74
End: 2024-05-17
Payer: MEDICARE

## 2024-05-17 DIAGNOSIS — I25.10 CORONARY ARTERY DISEASE INVOLVING NATIVE CORONARY ARTERY OF NATIVE HEART WITHOUT ANGINA PECTORIS: ICD-10-CM

## 2024-05-17 DIAGNOSIS — I71.9 DESCENDING AORTIC ANEURYSM (HCC): ICD-10-CM

## 2024-05-17 DIAGNOSIS — M17.11 OSTEOARTHRITIS OF RIGHT KNEE, UNSPECIFIED OSTEOARTHRITIS TYPE: ICD-10-CM

## 2024-05-17 DIAGNOSIS — I71.43 INFRARENAL ABDOMINAL AORTIC ANEURYSM (AAA) WITHOUT RUPTURE (HCC): Primary | ICD-10-CM

## 2024-05-17 DIAGNOSIS — I10 HYPERTENSION, UNSPECIFIED TYPE: Primary | ICD-10-CM

## 2024-05-17 PROCEDURE — 99214 OFFICE O/P EST MOD 30 MIN: CPT | Performed by: ORTHOPAEDIC SURGERY

## 2024-05-17 PROCEDURE — G8399 PT W/DXA RESULTS DOCUMENT: HCPCS | Performed by: ORTHOPAEDIC SURGERY

## 2024-05-17 PROCEDURE — G8417 CALC BMI ABV UP PARAM F/U: HCPCS | Performed by: ORTHOPAEDIC SURGERY

## 2024-05-17 PROCEDURE — G8427 DOCREV CUR MEDS BY ELIG CLIN: HCPCS | Performed by: ORTHOPAEDIC SURGERY

## 2024-05-17 PROCEDURE — 3017F COLORECTAL CA SCREEN DOC REV: CPT | Performed by: ORTHOPAEDIC SURGERY

## 2024-05-17 PROCEDURE — 1090F PRES/ABSN URINE INCON ASSESS: CPT | Performed by: ORTHOPAEDIC SURGERY

## 2024-05-17 PROCEDURE — 1123F ACP DISCUSS/DSCN MKR DOCD: CPT | Performed by: ORTHOPAEDIC SURGERY

## 2024-05-17 PROCEDURE — 1036F TOBACCO NON-USER: CPT | Performed by: ORTHOPAEDIC SURGERY

## 2024-05-17 RX ORDER — CEPHALEXIN 500 MG/1
500 CAPSULE ORAL EVERY 8 HOURS
Qty: 21 CAPSULE | Refills: 0 | Status: SHIPPED | OUTPATIENT
Start: 2024-05-17 | End: 2024-05-24

## 2024-05-17 RX ORDER — ASPIRIN 325 MG
325 TABLET ORAL 2 TIMES DAILY
Qty: 60 TABLET | Refills: 0 | Status: SHIPPED | OUTPATIENT
Start: 2024-05-17

## 2024-05-17 RX ORDER — ONDANSETRON 8 MG/1
8 TABLET, ORALLY DISINTEGRATING ORAL EVERY 8 HOURS PRN
Qty: 20 TABLET | Refills: 0 | Status: SHIPPED | OUTPATIENT
Start: 2024-05-17 | End: 2024-05-24

## 2024-05-17 RX ORDER — OXYCODONE HYDROCHLORIDE AND ACETAMINOPHEN 5; 325 MG/1; MG/1
1 TABLET ORAL
Qty: 30 TABLET | Refills: 0 | Status: SHIPPED | OUTPATIENT
Start: 2024-05-17 | End: 2024-05-25

## 2024-05-17 NOTE — PROGRESS NOTES
Name: Radha Grimes    : 1950     [facility] [address]  [address], [address]  [facility] [address]  Dept: [phone]  Dept Fax: [phone]     Chief Complaint   Patient presents with    Knee Pain    Pre-op Exam        There were no vitals taken for this visit.     Allergies   Allergen Reactions    Amitriptyline     Codeine Itching    Diphenhydramine Hives and Itching    Duloxetine Other (See Comments)     Shaking, feeling someone living inside my body, arms going numb.    Duloxetine Hcl     Gemfibrozil     Iodides Hives     IVP DYE    Iodinated Contrast Media Hives     Pt states severe hives     Lisinopril     Milnacipran     Pregabalin Other (See Comments)     Shaking, feeling someone living in my body, numb arm.  Any meds for fibromyialga  Other reaction(s): rash/itching    Sulfa Antibiotics      Other reaction(s): neurological reaction    Tetanus Toxoids Hives    Diphenhydramine-Zinc Acetate Rash and Other (See Comments)    Levofloxacin Nausea Only and Nausea And Vomiting    Sulfur Hives and Rash        Current Outpatient Medications   Medication Sig Dispense Refill    montelukast (SINGULAIR) 10 MG tablet Take 1 tablet by mouth nightly 30 tablet 5    albuterol sulfate HFA (PROVENTIL;VENTOLIN;PROAIR) 108 (90 Base) MCG/ACT inhaler Inhale 2 puffs into the lungs every 6 hours as needed for Wheezing 18 g 3    atorvastatin (LIPITOR) 80 MG tablet Take 1 tablet by mouth daily      atenolol (TENORMIN) 25 MG tablet Take 1 tablet by mouth daily      traZODone (DESYREL) 50 MG tablet Take 1 tablet by mouth nightly      albuterol (PROVENTIL) (2.5 MG/3ML) 0.083% nebulizer solution Inhale 3 mLs into the lungs every 6 hours as needed      Multiple Vitamin (MULTIVITAMIN) capsule Take 1 capsule by mouth daily      cyclobenzaprine (FLEXERIL) 5 MG tablet Take 1 tablet by mouth 3 times daily as needed for Muscle spasms

## 2024-05-20 DIAGNOSIS — Z96.651 STATUS POST TOTAL RIGHT KNEE REPLACEMENT: Primary | ICD-10-CM

## 2024-05-21 NOTE — PROGRESS NOTES
linear foci of fibrosis/scarring in the right middle lobe, left upper  lobe, lingular lobe, and basilar lungs unchanged compared to prior.     Trachea and major bronchi: Unremarkable     Pleura: No pneumothorax seen.  No pleural effusions present.      Mediastinum: No adenopathy appreciated.  No fluid collection appreciated. No  mass lesion seen. The aorta is suboptimally evaluated in the absence of  intravascular contrast, however appears grossly similar in size compared to the  previous exam with a maximum dimension of 3.6 cm through the diaphragmatic antwan.  There is a moderate atherosclerotic burden within the coronary vasculature as  well as the descending thoracic aorta.  The pulmonary artery diameter measures up to 3 cm, similar to prior.     Axilla/Chest Wall: Unremarkable     Musculoskeletal: No acute pathology.      Visualized upper abdomen: Moderate atherosclerotic burden of the abdominal  vasculature, not significantly changed compared to prior. No acute  abnormalities.     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     IMPRESSION:     1.  Stability of the 4 mm nodule in the right upper lobe compared to prior.  Additional micronodules are unchanged in size and distribution.  2.  Grossly stable appearance of the ascending aortic aneurysmal dilation in the  absence of intravascular contrast.  3.  Grossly stable minimal fibrotic/scarring changes in the lungs.     Lung RADS category 2; continue annual screening with LDCT.     Plan:  Medication reconciliation performed, patient instructed to continue meds as prior to this visit including her aspirin, statin and daily meds.  Patient instructed the given above findings and shows no significant change in her aneurysm and we will continue with observation and surveillance.  Repeat surveillance with noncon CT chest/a/p in 1 year (ordered).  Okay to continue to coordinate with pulmonary for we were able to obtain

## 2024-05-22 ENCOUNTER — HOME HEALTH ADMISSION (OUTPATIENT)
Age: 74
End: 2024-05-22
Payer: MEDICARE

## 2024-05-23 ENCOUNTER — HOME CARE VISIT (OUTPATIENT)
Age: 74
End: 2024-05-23

## 2024-05-23 VITALS
TEMPERATURE: 97.7 F | RESPIRATION RATE: 18 BRPM | SYSTOLIC BLOOD PRESSURE: 140 MMHG | HEART RATE: 67 BPM | DIASTOLIC BLOOD PRESSURE: 78 MMHG | OXYGEN SATURATION: 95 %

## 2024-05-23 PROCEDURE — 0221000100 HH NO PAY CLAIM PROCEDURE

## 2024-05-23 PROCEDURE — G0151 HHCP-SERV OF PT,EA 15 MIN: HCPCS

## 2024-05-23 ASSESSMENT — ENCOUNTER SYMPTOMS
DYSPNEA ACTIVITY LEVEL: AFTER AMBULATING LESS THAN 10 FT
PAIN LOCATION - PAIN QUALITY: ACHING

## 2024-05-23 NOTE — HOME HEALTH
breathing, s/s of infection       PATIENT LEVEL OF UNDERSTANDING OF EDUCATION PROVIDED: Patient expressed understanding of all education provided and was able to repeat back education, precautions, and HEP.    ASSESSMENT/CONTINUED NEED FOR THE FOLLOWING SKILLS:  Pt is referred for PT s/p R TKR.  She lives with her  in a one level home and was I PTA.  Currently she is mobile with a walker and S/MIN/MOD A.  R knee ROM 12 - 74.  Pain is controlled with ice and pain meds.  R LE is wrapped with ace over gauze.  Instructions are to unwrap tomorrow, Friday May 24, remove the cotton like material under the ace bandage keeping the incision covered by the current gauze pad in place then re-wrap with the ace bandage.  Continue with the gauze pads and ace bandage over incision until instructed to discontinue.     Pt presents with: decreased ROM, decreased strength, impaired gait, decreased ability w stair negotiation, increased swelling, decreased transfer status, decreased endurance, decreased balance and decreased safety, increased pain. HHPT is medically necessary in order to improve functional mobility/quality of life, decrease burden of care, reduce risk for re-hospitalization, work towards patient's personal goals of return to PLOF w decrease risk for falls.  Goals established for increased independence in the home, safe mobility in the home, improvement in strength and ROM - all designed to reduce fall risk and progress toward independence. Patient will benefit from PT intervention to progress toward meeting all established goals     PLAN: PT 3W3 for progression of mobility, ther ex for strength, ROM, provide education to patient and caregivers to maximize the recovery and reduce the fall risk to progress toward a return to independent function      DISCHARGE PLANNING DISCUSSED: Discharge to self and family under MD supervision once all goals have been met or patient has reached max potential. Patient/caregiver

## 2024-05-24 ENCOUNTER — HOME CARE VISIT (OUTPATIENT)
Age: 74
End: 2024-05-24

## 2024-05-24 PROCEDURE — G0157 HHC PT ASSISTANT EA 15: HCPCS

## 2024-05-25 ENCOUNTER — HOME CARE VISIT (OUTPATIENT)
Age: 74
End: 2024-05-25

## 2024-05-25 PROCEDURE — G0157 HHC PT ASSISTANT EA 15: HCPCS

## 2024-05-26 VITALS
SYSTOLIC BLOOD PRESSURE: 130 MMHG | OXYGEN SATURATION: 95 % | DIASTOLIC BLOOD PRESSURE: 62 MMHG | TEMPERATURE: 98.7 F | HEART RATE: 82 BPM

## 2024-05-27 ENCOUNTER — HOME CARE VISIT (OUTPATIENT)
Age: 74
End: 2024-05-27

## 2024-05-27 PROCEDURE — G0157 HHC PT ASSISTANT EA 15: HCPCS

## 2024-05-27 ASSESSMENT — ENCOUNTER SYMPTOMS: PAIN LOCATION - PAIN QUALITY: ACHE

## 2024-05-27 NOTE — TELEPHONE ENCOUNTER
Vinh Grant from [facility] called. Please have Dr Juan Gutierrez fax an order for overnight. <<--- Click to launch

## 2024-05-28 ASSESSMENT — ENCOUNTER SYMPTOMS: PAIN LOCATION - PAIN QUALITY: ACHE

## 2024-05-28 NOTE — HOME HEALTH
SUBJECTIVE: Pt reports she is not comfortable doing exercises in bed at this time and having her leg extended in supine.  Pt reports she is more comfortable in recliner.  CAREGIVER INVOLVEMENT/ASSISTANCE NEEDED FOR: pts dtr present during PT session and pts dtr and  assist pt with all needs prn.  .  OBJECTIVE:  See interventions.  PATIENT EDUCATION PROVIDED THIS VISIT: Pt instructed to continue HEP 2x/day and amb with FWW in home every waking hour.  Pt may continue to use cold therapy 20 minutes/hr for pain/edema management.  PATIENT RESPONSE TO EDUCATION PROVIDED: Pt reports she uses cold therapy often and has been doing her HEP.  PATIENT RESPONSE TO TREATMENT: Pt requires frequent rest breaks throughout PT session secondary to c/o fatigue in RLE.  .  ASSESSMENT OF PROGRESS TOWARD GOALS:Pt began bed mobility with cuing to scoot towards middle of bed and using strap to assist RLE on/off bed.  Pt used strap to assist with heel slides in bed with cuing.  Pt performed bed exercises in bed today vs recliner but reports she prefers to do exercises in recliner secondary to sitting up.  .  PLAN FOR NEXT VISIT: Will plan to continue working towards improving LE strength/R knee ROM and gait ability.  THE FOLLOWING DISCHARGE PLANNING WAS DISCUSSED WITH THE PATIENT/CAREGIVER: Pt is scheduled to continue PT 3w2.

## 2024-05-28 NOTE — HOME HEALTH
SUBJECTIVE: Pt reports she is doing okay but gets tired very easily.  Pt reports she did try to sleep in her bed for a little bit but it was very uncomfortable and she went back to her recliner.  CAREGIVER INVOLVEMENT/ASSISTANCE NEEDED FOR: pts dtr present during PT session and pts dtr and  assist pt with all needs prn.  .  OBJECTIVE:  See interventions. 86 R knee flex in sitting  PATIENT EDUCATION PROVIDED THIS VISIT: Pt instructed to continue cold maching to R knee prn for pain/edema management.  Pt to continue HEP 3x/day and amb with FWW in home every waking hour.  PATIENT RESPONSE TO EDUCATION PROVIDED: Pt reports she does her HEP 2-3x/day and walks as tolerated in home with FWW.  PATIENT RESPONSE TO TREATMENT: Pt requires frequent rest breaks throughout PT session secondary to c/o fatigue and R knee pain.  .  ASSESSMENT OF PROGRESS TOWARD GOALS:Pt has increase ROM in R knee from 74* initially to 86* today in sitting.  Pt continues to require frequent rest breaks throughout PT session secondary to c/o fatigue and R knee pain.  Pt attempted bed mobility again today and reports she prefers to do exercises in recliner vs bed.  .  PLAN FOR NEXT VISIT: Will plan to progress therapeutic exercises and gait distance to improve LE strength/R knee ROM and transfer/gait ability.  THE FOLLOWING DISCHARGE PLANNING WAS DISCUSSED WITH THE PATIENT/CAREGIVER: Pt is scheduled to continue PT 2 more visits this week then 3w1.

## 2024-05-28 NOTE — HOME HEALTH
SUBJECTIVE: Patient seated in chair with cold flow machine applied to R knee, reported 10/10 for pain  Falls-No  Medication changes -No  No s/sx of infection- No  CAREGIVER INVOLVEMENT/ASSISTANCE NEEDED: Patient lives in 1 story home, not able to perform ADLS, Pt lives with spouse and family visits to support/help with ADL care at this time     OBJECTIVE:  See interventions.  PATIENT EDUCATION PROVIDED THIS VISIT: Education provided on importance of BM and procedure to notify MD if necessary on day 3, fall prevention strategies, pain management, and s/sx of infection.  HEP established with gait, stretching, and seated exercise due to high level of pain, to be done as tolerated.  PATIENT RESPONSE TO EDUCATION PROVIDED: Pt able to teach back education and demonstrate stretches.    PATIENT RESPONSE TO TREATMENT: Pt reported high level of pain, 10/10, but reported decreased pain following removal of some layers of bandages. Able to tolerate standing and stretches with no c/o increased pain.    ASSESSMENT OF PROGRESS TOWARD GOALS: Patient making slow progress torward goals established with POC due to pain and discomfort from lack of BM. Patient working on increased ROM with verbal cues for technique and positioning. Continue skillled PT intervention needed to address deficits in strength, gait and transfers and balance to improve functional mobility and safety and lower risk for falls    PLAN FOR NEXT VISIT: Add exercises, gait  THE FOLLOWING DISCHARGE PLANNING WAS DISCUSSED WITH THE PATIENT/CAREGIVER:  Continue with PT services 3x week until goals are met or pt has reached maximum potential. Discharge set for 6/7/24, Patient with verbal understanding.

## 2024-05-29 ENCOUNTER — HOME CARE VISIT (OUTPATIENT)
Age: 74
End: 2024-05-29

## 2024-05-29 PROCEDURE — G0157 HHC PT ASSISTANT EA 15: HCPCS

## 2024-05-30 ENCOUNTER — OFFICE VISIT (OUTPATIENT)
Age: 74
End: 2024-05-30

## 2024-05-30 VITALS — TEMPERATURE: 98.4 F | OXYGEN SATURATION: 95 % | DIASTOLIC BLOOD PRESSURE: 76 MMHG | SYSTOLIC BLOOD PRESSURE: 130 MMHG

## 2024-05-30 DIAGNOSIS — Z96.651 STATUS POST TOTAL RIGHT KNEE REPLACEMENT: ICD-10-CM

## 2024-05-30 DIAGNOSIS — M25.561 RIGHT KNEE PAIN, UNSPECIFIED CHRONICITY: Primary | ICD-10-CM

## 2024-05-30 PROCEDURE — 99024 POSTOP FOLLOW-UP VISIT: CPT

## 2024-05-30 ASSESSMENT — ENCOUNTER SYMPTOMS: PAIN LOCATION - PAIN QUALITY: ACHES

## 2024-05-30 NOTE — PROGRESS NOTES
mouth as needed      calcium carbonate 600 MG TABS tablet Take 1 tablet by mouth 2 times daily as needed      magnesium (MAGNESIUM-OXIDE) 250 MG TABS tablet Take 0.5 tablets by mouth daily      b complex vitamins capsule Take 1 capsule by mouth daily      OXYGEN Inhale 2 L into the lungs nightly      nitroGLYCERIN (NITROSTAT) 0.4 MG SL tablet Place 1 tablet under the tongue every 5 minutes as needed for Chest pain 25 tablet 3    aspirin 81 MG EC tablet Take 1 tablet by mouth daily not taking      vitamin D3 (CHOLECALCIFEROL) 125 MCG (5000 UT) TABS tablet Take 1 tablet by mouth daily      fluticasone (FLONASE) 50 MCG/ACT nasal spray 2 sprays by Nasal route 2 times daily as needed      HYDROcodone-acetaminophen (NORCO) 5-325 MG per tablet Take 1 tablet by mouth every 8 hours as needed for Pain. not taking      levocetirizine (XYZAL) 5 MG tablet Take 1 tablet by mouth daily not taking      niacin (SLO-NIACIN) 500 MG extended release tablet Take 2 tablets by mouth      NIFEdipine (PROCARDIA XL) 30 MG extended release tablet Take 1 tablet by mouth daily      pantoprazole (PROTONIX) 40 MG tablet Take 1 tablet by mouth daily not taking - omeprazole prescribed instead       No current facility-administered medications for this visit.      Patient Active Problem List   Diagnosis    Osteoarthritis of spine with radiculopathy, lumbar region    Cervical facet syndrome    Hypertension    Degenerative disc disease, cervical    Spondylosis of cervical region without myelopathy or radiculopathy    Incidental lung nodule, > 3mm and < 8mm    Bronchitis with chronic airway obstruction (HCC)    Fibromyalgia    Chronic pain syndrome    Lumbar degenerative disc disease    Spondylosis of lumbar region without myelopathy or radiculopathy    Arthritis    Descending aortic aneurysm (HCC)    Cervical disc disease    Nocturnal hypoxemia    Neuropathy    Hypercholesterolemia    Chest pain    Coronary artery disease involving native coronary

## 2024-05-30 NOTE — PATIENT INSTRUCTIONS
Post Operative Total Knee Replacement Instructions    PLEASE REMOVE YOUR LONG WHITE BANDAGE & STOCKING PRIOR TO CONNECTING TO YOUR APPOINTMENT       During your recovery from a total knee replacement, you will be participating in an OUTPATIENT physical therapy program. Your goal is to progress from a walker to a cane to nothing at all while walking, if possible, over the next 2 weeks.     You can now shower and get your incision wet, pat it dry afterwards. No further dressing changes will be required as long as there is no drainage.  You may take a bath 3 weeks post surgery as long as there is no drainage from your incision.    You may drive if you are not using any assistive devices to walk and are not using any narcotic pain medication.     You may discontinue your aspirin (if that is your primary blood thinner prescribed by Dr. Austin ) when you are at least 4 weeks out from surgery and are no longer using a cane or walker.  If you are still using assistive devices, please DO NOT stop the aspirin until you are completely off them.  If you are on other blood thinners prescribed by another doctor please continue that until you are instructed to discontinue them.    You and your physical therapist will determine when to stop your physical therapy program.    Narcotic pain medication can cause constipation.  You may take over the counter stool softeners such as Docusate Sodium or Miralax 1-2 times per day to assist with the constipation.  Ensure you are taking in plenty of fluids and fiber as well.    If you require a refill on a narcotic pain medication, please let Dr. Austin or his Nurse Practitioner know at your appointment today or AT LEAST 48 hours prior to needing it. No refills will be provided after hours or during weekends.    If you experience any significant calf pain, swelling, or shortness of breath, please call our office immediately or go to the nearest emergency room.    If you notice any significant

## 2024-05-31 ENCOUNTER — HOME CARE VISIT (OUTPATIENT)
Age: 74
End: 2024-05-31
Payer: MEDICARE

## 2024-05-31 PROCEDURE — G0157 HHC PT ASSISTANT EA 15: HCPCS

## 2024-05-31 NOTE — HOME HEALTH
SUBJECTIVE: Pt reports she is doing well, still sleeping in recliner, pain 5/10 upon arrival  No Falls  No changes in Meds  No s/sx of infection  CAREGIVER INVOLVEMENT/ASSISTANCE NEEDED FOR: Pt requires assist and set for ADLs at this time, Lives with spouse who is able to perform some assist, family stops in to help.  OBJECTIVE:  See interventions.   ROM 0-88 degrees R knee  PATIENT EDUCATION: Education provided on fall prevention, pain management, and to continue to monitor for bleeding while taking blood thinners,   Education on HEP, to increase gait to every hour and perform exercises 12 reps 3 x day  RESPONSE TO EDUCATION: Pt with verbal understanding of education and able to demonstrate exercises.    PT RESPONSE TO THERAPY: Pt requires frequent rest breaks during exercises due to fatigue and c/o increased pain. Pt able to perform steps with standing rest break and no SOB, SATS >95%.    ASSESSMENT: :Patient is progressing towards goals as previously established in POC as made apparent by increased ROM in R knee to 88 degrees allowing for improved gait pattern. Pt working to increased strength but limited by pain with activity. Pt required decreased verbal cues for improved gait pattern with continuious steps with 75% carryover.  Skillled PT intervention needed to address deficits in strength, gait and transfers and balance to improve functional mobility and safety and lower risk for falls    PLAN: Gait training, Bed transfers  THE FOLLWING DISCHARGE PLANS DISCUSSED:  Continue with PT services 3 x week until goals are met or pt has reached maximum potential. Discharge set for 6/7/24 , Patient with verbal understanding.

## 2024-06-02 VITALS
HEART RATE: 71 BPM | OXYGEN SATURATION: 97 % | DIASTOLIC BLOOD PRESSURE: 64 MMHG | SYSTOLIC BLOOD PRESSURE: 136 MMHG | TEMPERATURE: 97.7 F

## 2024-06-04 ENCOUNTER — HOME CARE VISIT (OUTPATIENT)
Age: 74
End: 2024-06-04
Payer: MEDICARE

## 2024-06-04 PROCEDURE — G0157 HHC PT ASSISTANT EA 15: HCPCS

## 2024-06-05 ENCOUNTER — HOME CARE VISIT (OUTPATIENT)
Age: 74
End: 2024-06-05
Payer: MEDICARE

## 2024-06-06 ENCOUNTER — HOME CARE VISIT (OUTPATIENT)
Age: 74
End: 2024-06-06
Payer: MEDICARE

## 2024-06-06 PROCEDURE — G0157 HHC PT ASSISTANT EA 15: HCPCS

## 2024-06-07 ENCOUNTER — TELEPHONE (OUTPATIENT)
Age: 74
End: 2024-06-07

## 2024-06-07 ENCOUNTER — HOME CARE VISIT (OUTPATIENT)
Age: 74
End: 2024-06-07
Payer: MEDICARE

## 2024-06-07 VITALS
DIASTOLIC BLOOD PRESSURE: 70 MMHG | RESPIRATION RATE: 17 BRPM | HEART RATE: 64 BPM | TEMPERATURE: 97.7 F | SYSTOLIC BLOOD PRESSURE: 118 MMHG | OXYGEN SATURATION: 96 %

## 2024-06-07 VITALS
TEMPERATURE: 97.5 F | DIASTOLIC BLOOD PRESSURE: 72 MMHG | HEART RATE: 76 BPM | OXYGEN SATURATION: 96 % | SYSTOLIC BLOOD PRESSURE: 138 MMHG

## 2024-06-07 PROCEDURE — G0151 HHCP-SERV OF PT,EA 15 MIN: HCPCS

## 2024-06-07 RX ORDER — METHYLPREDNISOLONE 4 MG/1
TABLET ORAL
Qty: 1 KIT | Refills: 0 | Status: SHIPPED | OUTPATIENT
Start: 2024-06-07 | End: 2024-06-07

## 2024-06-07 ASSESSMENT — ENCOUNTER SYMPTOMS
DYSPNEA ACTIVITY LEVEL: AFTER AMBULATING MORE THAN 20 FT
PAIN LOCATION - PAIN QUALITY: ACHING

## 2024-06-07 NOTE — HOME HEALTH
NT  BALANCE: Pt scored 24/28 on Tinetti Balance Assessment placing her at moderate risk for falls.  Balance at eval: Pt scored 11/28 on Tinetti Balance Assessment placing her at high risk for falls.      HEP consisting of:  1. Walking every hour during the day with RW   2.  seated; AP, LAQ, Supine; SLR, hip abd, heel press, heel slides with self assist, quad sets, Standing ; marches, hip abd, all exercises x 15 reps 3 x day  Stretches with seated R knee pull back, added step stretch, and heel elevated in sitting/supine for extension  Written HEP issued, patient/caregiver verbalized understanding.     PATIENT RESPONE TO TX: Pt demo'd positive response to PT shown by full participation w/o increased pain and with stable vitals  PATIENT EDUCATION PROVIDED THIS VISIT: safety, HEP, walking, deep breathing, edema management           PATIENT LEVEL OF UNDERSTANDING OF EDUCATION PROVIDED Pt/CG verbalizes understanding of all information and demo's back as appropriate       Patient is s/p R TKR and has been treated for ROM, strengthening, gait training, stair training, HEP training, safety training, and balance training.  Pt has made progress and met most PT goals.  R knee incision is open to air with glue in place.  Edges approximated with no s/s of infection.  ROM 2 - 82.  Pt's R knee remains edematous and pt verbalizes frustration with this.  PT advised her to ice and elevate more often and lie down with LE elevated a few times a day instead of just in recliner with LE elevated only to hip height.    ROM: R knee 2 - 82.  ROM at eval: R knee: 12 - 74.  L LE grossly WFL  STRENGTH: 5 STS = 19.3 sec.  Strength at eval: R hip flexion 3-/5; knee flexion 2-/5, extension 2-/5; dorsiflexion 4/5.  L LE WFL  WOUNDS: L knee incision is open to air with dermabond in place.  Edges are intact with no s/s of infection.  Wound status at eval: R knee is wrapped with ace with MARCOS hose over.  MD instructions are: \"after 48 hours you may

## 2024-06-07 NOTE — HOME HEALTH
SUBJECTIVE: Pt reports she is doing well, pain wakes her in bed so goes back to recliner with ice machine to sleep, pain 5/10 upon arrival  No Falls  No changes in Meds  No s/sx of infection  CAREGIVER INVOLVEMENT/ASSISTANCE NEEDED FOR: Pt requires assist and set for ADLs at this time, Lives with spouse who is able to perform some assist, family stops in to help.    OBJECTIVE:  See interventions.   PATIENT EDUCATION: Education provided on fall prevention, transfers, gait with/without AD, and balance  Education on HEP, to increase gait to every hour and perform exercises 15 reps each  RESPONSE TO EDUCATION: Pt with verbal understanding of education and able to demonstrate exercises.    PT RESPONSE TO THERAPY: Pt requires rest breaks during exercises due to fatigue and c/o increased pain. Pt remains motivated to improve, but required encouragement to perform ROM activities  ASSESSMENT :Patient is progressing towards goals as previously established in POC as made apparent by improved gait pattern and use of SPC and no AD training. Pt showing improved endurance with gait and standing exercises alllowing for improved functional ambulation with decreased risk for falls  Skillled PT intervention needed to address deficits in strength, gait and transfers and balance to improve functional mobility and safety and lower risk for falls    PLAN: Gait outdoors, Stretches  THE FOLLWING DISCHARGE PLANS DISCUSSED:  Continue with PT services 3 x week until goals are met or pt has reached maximum potential. Discharge set for 6/7/24 , Patient with verbal understanding.

## 2024-06-07 NOTE — HOME HEALTH
SUBJECTIVE: Pt reports difficulety sleeping in bed due to pain, moves to recliner with ice machine around 4am, Pain increased to 6/10  No Falls  No changes in Meds  No s/sx of infection    CAREGIVER INVOLVEMENT/ASSISTANCE NEEDED FOR: Pt requires assist and set up for ADLs at this time, Spuse and family assist with meals,   houehold chores and transportation  OBJECTIVE:  See interventions.     PATIENT EDUCATION: Education provided on fall prevention, pain management with continued ice, and stretching.    HEP education: Gait every hour, seated; AP, LAQ,    Supine; SLR,  hip abd,  heel press, heel slides with self assist, quad sets, Standing ; marches, hip abd, all exercises x 15 reps 3 x day  Stretches with seated R knee pull back, added step stretch, and heel elevated in sitting/supine for extension    RESPONSE TO EDUCATION: Pt with verbal understanding of education and able to demonstrate exercises.    PT RESPONSE TO THERAPY: Pt  anle to perform gait and standing exercise without seated rest break or c/o SOB, Pt continues to have increased pain with all WB activity and exercises.  Patient remains positive about progress made and motivated to improve to return to [facility]    ASSESSMENT AND SUMMARY OF CARE:  Patient's current functional status before discharge is as follows  Strength: R hip flexion 3/5, knee flexion 2+/5, extension2+/5, dorsiflexion 4+/5, all improved from eval with 3-/5; knee flexion 2-/5, extension 2-/5; dorsiflexion 4/5.  L LE WFL  ROM:  R knee 5-78 degrees, improved from  12 - 74. at initial eval,  L LE grossly WFL  Bed Mobility:IND in both recliner and bed,    Transfers: IND from recliner, kitchen chair, bed, and car transfers, improved from level of S at eval showing improved stability and decreased risk for falls  Gait: Pt amb outdoors on uneven surfaces >400' Da with FWW, pt amb with QC outdoors with S. Gait characterized with good step length, even step-through gait pattern,          Gait
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
## 2024-06-15 NOTE — HOME HEALTH
SUBJECTIVE: Pt pain 6/10 upon arrival  No Falls  No changes in Meds  No s/sx of infection  CAREGIVER INVOLVEMENT/ASSISTANCE NEEDED FOR: Pt requires assist and set for ADLs at this time, Lives with spouse who is able to perform some assist, family stops in to help.    OBJECTIVE:  See interventions.   PATIENT EDUCATION: Education provided on fall prevention and gait on uneven surfaces  Education on HEP, to increase ROM stretches to 5x day  RESPONSE TO EDUCATION: Pt with verbal understanding of education and able to demonstrate exercises.    PT RESPONSE TO THERAPY: Pt required less frequentrest breaks during exercises due to fatigue or c/o increased pain. Pt remains motivated to improve, to return to driving and PLOF  ASSESSMENT :Patient is progressing towards goals as previously established in POC as made apparent by improved gait pattern and gait distance on uneven surfaces outdoors. Pt resisting manual assist for increased ROM, increased edema noted.  Skillled PT intervention needed to address deficits in strength, gait and transfers and balance to improve functional mobility and safety and lower risk for falls    PLAN: Hubert, Review goals  THE FOLLWING DISCHARGE PLANS DISCUSSED:  Continue with PT services 3 x week until goals are met or pt has reached maximum potential. Discharge set for 6/7/24 , Patient with verbal understanding.

## 2024-06-18 ENCOUNTER — TELEMEDICINE (OUTPATIENT)
Age: 74
End: 2024-06-18

## 2024-06-18 DIAGNOSIS — M25.561 RIGHT KNEE PAIN, UNSPECIFIED CHRONICITY: ICD-10-CM

## 2024-06-18 DIAGNOSIS — Z96.651 STATUS POST TOTAL RIGHT KNEE REPLACEMENT: Primary | ICD-10-CM

## 2024-06-18 PROCEDURE — 99024 POSTOP FOLLOW-UP VISIT: CPT

## 2024-06-18 NOTE — PROGRESS NOTES
History    Marital status:      Spouse name: None    Number of children: None    Years of education: None    Highest education level: None   Tobacco Use    Smoking status: Former     Current packs/day: 0.00     Average packs/day: 1 pack/day for 45.0 years (45.0 ttl pk-yrs)     Types: Cigarettes     Quit date: 1/1/2014     Years since quitting: 10.4    Smokeless tobacco: Never   Vaping Use    Vaping Use: Never used   Substance and Sexual Activity    Alcohol use: No    Drug use: No    Sexual activity: Yes     Social Determinants of Health     Transportation Needs: No Transportation Needs (6/7/2024)    OASIS : Transportation     Lack of Transportation (Medical): No     Lack of Transportation (Non-Medical): No     Patient Unable or Declines to Respond: No   Social Connections: Feeling Socially Integrated (6/7/2024)    OASIS : Social Isolation     Frequency of experiencing loneliness or isolation: Never      Past Surgical History:   Procedure Laterality Date    APPENDECTOMY      BREAST BIOPSY Left     CARDIAC CATHETERIZATION      x2    CATARACT REMOVAL      COLONOSCOPY N/A 1/4/2023    COLONOSCOPY performed by Jordan Terry MD at [facility] ENDOSCOPY    COLONOSCOPY N/A 07/03/2017    COLONOSCOPY with biopsies performed by Edward Zaragoza MD at [facility] ENDOSCOPY    TUBAL LIGATION        Past Medical History:   Diagnosis Date    Arthritis     Chronic obstructive pulmonary disease (HCC)     Coronary artery disease involving native coronary artery of native heart without angina pectoris 06/2021    Nonobstructive CAD.  Mid RCA 50-60%, negative IFR.  Left main, LAD, left circumflex patent.    Descending aortic aneurysm (HCC)     Fibromyalgia     Hypercholesterolemia     Hypertension     Ill-defined condition     Irregular heart beat     Oxygen dependent     at night 2LNC    Sleep apnea     no cpap, but wear Oxygen at 2Li at night        I have reviewed and agree with PFSH and ROS and intake form in chart and the record

## 2024-07-01 NOTE — PROGRESS NOTES
and the record furthermore I have reviewed prior medical record(s) regarding this patients care during this appointment.   Subjective:      Patient presents for postop care following a right total knee replacement on 5/22/2024. Ambulating well without assistive devices. Pain is a 5/10, well controlled with Tylenol.  Patient is able to demonstrate near full extension but continues to lack flexion.  Patient feels that the flexion is being hindered by swelling.  Patient was previously on Lasix and tried 5 days of her Lasix without any improvement.  Patient at one point received some massage therapy from her physical therapist to assist with the swelling which helped temporarily but came back at short time later.  I had offered patient a Medrol Dosepak in the past however patient was hesitant to take as patient previously had significantly elevated blood glucose levels after taking a steroid pack at 1 point however, patient is willing to give it a try as she continues to have lack of flexion and swelling.      Objective:     There were no vitals taken for this visit.    General:  alert, cooperative, no distress, appears stated age   ROM: Right knee - Neurovascularly intact with good cap refill, full range of motion and full strength, well healed incision noted, no swelling, no erythema, no instability.     Left knee - Decrease range of motion with flexion, Some crepitation, Grossly neurovascularly intact, Good cap refill, No skin lesion, Moderate swelling, No gross instability, Some quadriceps weakness     Incision:   healing well, no drainage, no erythema, incision well approximated, no swelling     Assessment:     Doing well postoperatively.    Plan:     Plan will be to continue PT and home exercises. Continue DVT prophylaxis as directed until 1 month post-op unless prescribed by another provider. Patient is to increase activities as tolerated, weight bearing as tolerated, no restrictions. Medrol dose pack for

## 2024-07-02 ENCOUNTER — TELEMEDICINE (OUTPATIENT)
Age: 74
End: 2024-07-02

## 2024-07-02 DIAGNOSIS — Z96.651 STATUS POST TOTAL RIGHT KNEE REPLACEMENT: Primary | ICD-10-CM

## 2024-07-02 DIAGNOSIS — M25.561 RIGHT KNEE PAIN, UNSPECIFIED CHRONICITY: ICD-10-CM

## 2024-07-02 PROCEDURE — 99024 POSTOP FOLLOW-UP VISIT: CPT

## 2024-07-02 RX ORDER — METHYLPREDNISOLONE 4 MG/1
TABLET ORAL
Qty: 1 KIT | Refills: 0 | Status: SHIPPED | OUTPATIENT
Start: 2024-07-02

## 2024-07-02 NOTE — PATIENT INSTRUCTIONS
Total Knee Replacement: What to Expect at Home  Your Recovery     You had a total knee replacement. The doctor replaced the worn ends of the bones that connect to your knee (thighbone and lower leg bone) with plastic and metal parts.  When you leave the hospital, you should be able to move around with a walker or crutches. But you will need someone to help you at home until you have more energy and can move around better.  You will go home with a bandage and stitches, staples, skin glue, or tape strips. Change the bandage as your doctor tells you to. If you have stitches or staples, your doctor will remove them about 2 weeks after your surgery. Glue or tape strips will fall off on their own over time. You may still have some mild pain, and the area may be swollen for a few months after surgery.  Your knee will continue to improve for up to a year. You will probably use a walker for some time after surgery. When you are ready, you can use a cane. You may be able to walk without support after a couple weeks, or when you are comfortable.  You will need to do months of physical rehabilitation (rehab) after a knee replacement. Rehab will help you strengthen the muscles of the knee and help you regain movement. After you recover, your artificial knee will allow you to do normal daily activities with less pain or no pain at all. You may be able to hike, dance, or ride a bike. Talk to your doctor about whether you can do more strenuous activities. Always tell your caregivers that you have an artificial knee.  How long it will take to walk on your own, return to normal activities, and go back to work depends on your health and how well your rehabilitation (rehab) program goes. The better you do with your rehab exercises, the quicker you will get your strength and movement back.  This care sheet gives you a general idea about how long it will take for you to recover. But each person recovers at a different pace. Follow the

## 2024-07-08 ENCOUNTER — TELEPHONE (OUTPATIENT)
Age: 74
End: 2024-07-08

## 2024-07-08 DIAGNOSIS — I10 ESSENTIAL (PRIMARY) HYPERTENSION: ICD-10-CM

## 2024-07-08 DIAGNOSIS — Z96.651 STATUS POST TOTAL RIGHT KNEE REPLACEMENT: Primary | ICD-10-CM

## 2024-07-08 DIAGNOSIS — M25.561 RIGHT KNEE PAIN, UNSPECIFIED CHRONICITY: ICD-10-CM

## 2024-07-08 NOTE — TELEPHONE ENCOUNTER
Pt states she would like to speak to you regarding her knee. She had a RT TKR 05/22/2024.    She states her knee is a little red and a little warmer than her other knee. She states she still has some swelling and she feels like her knee locks up on her.     She states that when she does pt and her home exercises there is some swelling and stiffness.    She has taken her last prednisone     She states she has PT today at 11:15 but she feels like she needs blood work or an xray to rule out any infection.     She has an appt on Friday at 10:40am

## 2024-07-09 ENCOUNTER — HOSPITAL ENCOUNTER (OUTPATIENT)
Facility: HOSPITAL | Age: 74
Discharge: HOME | End: 2024-07-12
Payer: MEDICARE

## 2024-07-09 DIAGNOSIS — M25.561 RIGHT KNEE PAIN, UNSPECIFIED CHRONICITY: ICD-10-CM

## 2024-07-09 DIAGNOSIS — I10 ESSENTIAL (PRIMARY) HYPERTENSION: ICD-10-CM

## 2024-07-09 DIAGNOSIS — Z96.651 STATUS POST TOTAL RIGHT KNEE REPLACEMENT: ICD-10-CM

## 2024-07-09 LAB
BASOPHILS # BLD: 0.1 K/UL (ref 0–0.1)
BASOPHILS NFR BLD: 1 % (ref 0–2)
CRP SERPL HS-MCNC: 0.6 MG/L
DIFFERENTIAL METHOD BLD: ABNORMAL
EOSINOPHIL # BLD: 0.5 K/UL (ref 0–0.4)
EOSINOPHIL NFR BLD: 5 % (ref 0–5)
ERYTHROCYTE [DISTWIDTH] IN BLOOD BY AUTOMATED COUNT: 14 % (ref 11.6–14.5)
ERYTHROCYTE [SEDIMENTATION RATE] IN BLOOD: 19 MM/HR (ref 0–30)
HCT VFR BLD AUTO: 42.7 % (ref 35–45)
HGB BLD-MCNC: 13.8 G/DL (ref 12–16)
IMM GRANULOCYTES # BLD AUTO: 0 K/UL (ref 0–0.04)
IMM GRANULOCYTES NFR BLD AUTO: 0 % (ref 0–0.5)
LYMPHOCYTES # BLD: 2.7 K/UL (ref 0.9–3.6)
LYMPHOCYTES NFR BLD: 29 % (ref 21–52)
MCH RBC QN AUTO: 31.7 PG (ref 24–34)
MCHC RBC AUTO-ENTMCNC: 32.3 G/DL (ref 31–37)
MCV RBC AUTO: 98.2 FL (ref 78–100)
MONOCYTES # BLD: 0.8 K/UL (ref 0.05–1.2)
MONOCYTES NFR BLD: 8 % (ref 3–10)
NEUTS SEG # BLD: 5.1 K/UL (ref 1.8–8)
NEUTS SEG NFR BLD: 56 % (ref 40–73)
NRBC # BLD: 0 K/UL (ref 0–0.01)
NRBC BLD-RTO: 0 PER 100 WBC
PLATELET # BLD AUTO: 241 K/UL (ref 135–420)
PMV BLD AUTO: 11.2 FL (ref 9.2–11.8)
PROCALCITONIN SERPL-MCNC: <0.05 NG/ML
RBC # BLD AUTO: 4.35 M/UL (ref 4.2–5.3)
WBC # BLD AUTO: 9.1 K/UL (ref 4.6–13.2)

## 2024-07-09 PROCEDURE — 73564 X-RAY EXAM KNEE 4 OR MORE: CPT

## 2024-07-09 PROCEDURE — 85652 RBC SED RATE AUTOMATED: CPT

## 2024-07-09 PROCEDURE — 86141 C-REACTIVE PROTEIN HS: CPT

## 2024-07-09 PROCEDURE — 85025 COMPLETE CBC W/AUTO DIFF WBC: CPT

## 2024-07-09 PROCEDURE — 84145 PROCALCITONIN (PCT): CPT

## 2024-07-09 PROCEDURE — 36415 COLL VENOUS BLD VENIPUNCTURE: CPT

## 2024-07-12 ENCOUNTER — OFFICE VISIT (OUTPATIENT)
Age: 74
End: 2024-07-12

## 2024-07-12 DIAGNOSIS — G89.29 CHRONIC PAIN OF RIGHT KNEE: Primary | ICD-10-CM

## 2024-07-12 DIAGNOSIS — Z96.651 STATUS POST TOTAL RIGHT KNEE REPLACEMENT: Primary | ICD-10-CM

## 2024-07-12 DIAGNOSIS — Z96.651 STATUS POST TOTAL RIGHT KNEE REPLACEMENT: ICD-10-CM

## 2024-07-12 DIAGNOSIS — M25.561 CHRONIC PAIN OF RIGHT KNEE: Primary | ICD-10-CM

## 2024-07-12 PROCEDURE — 99024 POSTOP FOLLOW-UP VISIT: CPT | Performed by: ORTHOPAEDIC SURGERY

## 2024-07-12 RX ORDER — OXYCODONE HYDROCHLORIDE AND ACETAMINOPHEN 5; 325 MG/1; MG/1
1 TABLET ORAL
Qty: 30 TABLET | Refills: 0 | Status: SHIPPED | OUTPATIENT
Start: 2024-07-12 | End: 2024-07-20

## 2024-07-12 NOTE — PROGRESS NOTES
this visit.       Patient Active Problem List   Diagnosis    Osteoarthritis of spine with radiculopathy, lumbar region    Cervical facet syndrome    Hypertension    Degenerative disc disease, cervical    Spondylosis of cervical region without myelopathy or radiculopathy    Incidental lung nodule, > 3mm and < 8mm    Bronchitis with chronic airway obstruction (HCC)    Fibromyalgia    Chronic pain syndrome    Lumbar degenerative disc disease    Spondylosis of lumbar region without myelopathy or radiculopathy    Arthritis    Descending aortic aneurysm (HCC)    Cervical disc disease    Nocturnal hypoxemia    Neuropathy    Hypercholesterolemia    Chest pain    Coronary artery disease involving native coronary artery of native heart without angina pectoris    Chronic kidney disease, stage 2 (mild)    Malignant hypertensive kidney disease with chronic kidney disease stage I through stage IV, or unspecified    Adrenocortical hyperfunction (HCC)    Cardiac murmur, unspecified    Chronic obstructive pulmonary disease (HCC)    Generalized hyperhidrosis    Hypertensive heart and kidney disease    Seasonal allergic rhinitis    Shortness of breath    Osteoarthrosis    Atherosclerotic heart disease of native coronary artery without angina pectoris    Aortic aneurysm (HCC)    Hyperlipidemia    Essential hypertension    Peripheral vascular disease (HCC)    Sleep apnea      Family History   Problem Relation Age of Onset    Diabetes Mother     Hypertension Mother     Cancer Father     Stroke Father     Pancreatic Cancer Father     No Known Problems Sister     Prostate Cancer Brother         throat cancer    No Known Problems Maternal Grandmother     No Known Problems Maternal Grandfather     No Known Problems Paternal Grandmother     No Known Problems Paternal Grandfather     No Known Problems Maternal Aunt     No Known Problems Maternal Uncle     Ovarian Cancer Paternal Aunt     No Known Problems Paternal Uncle     No Known Problems

## 2024-07-12 NOTE — H&P (VIEW-ONLY)
Name: Radha Grimes    : 1950     [facility] [place]  [address], [address]  [facility] [address]  Dept: [phone]  Dept Fax: [phone]     Chief Complaint   Patient presents with    Post-Op Check    Knee Pain        There were no vitals taken for this visit.     Allergies   Allergen Reactions    Amitriptyline     Codeine Itching    Diphenhydramine Hives and Itching    Duloxetine Other (See Comments)     Shaking, feeling someone living inside my body, arms going numb.    Duloxetine Hcl     Gemfibrozil     Iodides Hives     IVP DYE    Iodinated Contrast Media Hives     Pt states severe hives     Lisinopril     Milnacipran     Pregabalin Other (See Comments)     Shaking, feeling someone living in my body, numb arm.  Any meds for fibromyialga  Other reaction(s): rash/itching    Sulfa Antibiotics      Other reaction(s): neurological reaction    Tetanus Toxoids Hives    Diphenhydramine-Zinc Acetate Rash and Other (See Comments)    Levofloxacin Nausea Only and Nausea And Vomiting    Sulfur Hives and Rash        Current Outpatient Medications   Medication Sig Dispense Refill    methylPREDNISolone (MEDROL DOSEPACK) 4 MG tablet Take by mouth Per Dose pack instructions 1 kit 0    esomeprazole Magnesium (NEXIUM) 20 MG PACK Take 20 mg by mouth daily.      acetaminophen (TYLENOL) 500 MG CAPS capsule Take 500 mg by mouth every 8 hours as needed for Pain. do not exceed 4000mg total acteaminophen per day      aspirin 325 MG tablet Take 1 tablet by mouth in the morning and at bedtime DO NOT START MEDICATION UNTIL AFTER SURGERY 60 tablet 0    montelukast (SINGULAIR) 10 MG tablet Take 1 tablet by mouth nightly 30 tablet 5    albuterol sulfate HFA (PROVENTIL;VENTOLIN;PROAIR) 108 (90 Base) MCG/ACT inhaler Inhale 2 puffs into the lungs every 6 hours as needed for Wheezing 18 g 3    atorvastatin (LIPITOR) 80 MG tablet Take 1 tablet  multiple operations, nerve injury, artery injury, tendon injury, poor result, poor wound healing, unforeseen incidence, etc. Patient was told of no guarantees. Patient accepts all risks and benefits    The patient was counseled about the risks of diogo Covid-19 during their perioperative period and any recovery window from their procedure. The patient was made aware that diogo Covid-19 may worsen their prognosis for recovering from their procedure and lend to a higher morbidity and/or mortality risk. All material risks, benefits, and reasonable alternatives including postponing the procedure were discussed. The patient DOES wish to proceed with their procedure at this time.    Encounter Diagnosis   Name Primary?    Chronic pain of right knee Yes         HPI:  The patient is here with a chief complaint of right knee pain, status post right total knee replacement, 7 weeks out.  She is doing well.  Just having some stiffness especially lacks flexion.    Assessment/Plan:  Plan at this point my recommendation will be for right knee manipulation and we will go from there.  If she gets worse, she is to give me a call.  No restrictions in the meantime.    As part of continued conservative pain management options the patient was advised to utilize Tylenol or OTC NSAIDS as long as it is not medically contraindicated.     Return to Office:    Follow-up and Dispositions    Return for SCHEDULE FOR SURGERY.        Scribed by Maggie Gordon LPN as dictated by Dar Austin MD.  Documentation, performed by, True and Accepted Dar Austin MD

## 2024-07-15 ENCOUNTER — ANESTHESIA EVENT (OUTPATIENT)
Age: 74
End: 2024-07-15
Payer: MEDICARE

## 2024-07-15 DIAGNOSIS — G89.29 CHRONIC PAIN OF RIGHT KNEE: Primary | ICD-10-CM

## 2024-07-15 DIAGNOSIS — M24.561 CONTRACTURE OF RIGHT KNEE: ICD-10-CM

## 2024-07-15 DIAGNOSIS — M25.561 CHRONIC PAIN OF RIGHT KNEE: Primary | ICD-10-CM

## 2024-07-15 RX ORDER — SODIUM CHLORIDE 0.9 % (FLUSH) 0.9 %
5-40 SYRINGE (ML) INJECTION EVERY 12 HOURS SCHEDULED
Status: CANCELLED | OUTPATIENT
Start: 2024-07-15

## 2024-07-17 RX ORDER — SODIUM CHLORIDE 0.9 % (FLUSH) 0.9 %
5-40 SYRINGE (ML) INJECTION EVERY 12 HOURS SCHEDULED
Status: CANCELLED | OUTPATIENT
Start: 2024-07-17

## 2024-07-18 ENCOUNTER — ANESTHESIA (OUTPATIENT)
Age: 74
End: 2024-07-18
Payer: MEDICARE

## 2024-07-18 ENCOUNTER — APPOINTMENT (OUTPATIENT)
Age: 74
End: 2024-07-18
Attending: ORTHOPAEDIC SURGERY
Payer: MEDICARE

## 2024-07-18 ENCOUNTER — HOSPITAL ENCOUNTER (OUTPATIENT)
Age: 74
Setting detail: OUTPATIENT SURGERY
Discharge: HOME | End: 2024-07-18
Attending: ORTHOPAEDIC SURGERY | Admitting: ORTHOPAEDIC SURGERY
Payer: MEDICARE

## 2024-07-18 VITALS
TEMPERATURE: 98 F | OXYGEN SATURATION: 97 % | RESPIRATION RATE: 17 BRPM | DIASTOLIC BLOOD PRESSURE: 69 MMHG | SYSTOLIC BLOOD PRESSURE: 125 MMHG | HEART RATE: 63 BPM | WEIGHT: 175 LBS | BODY MASS INDEX: 33.04 KG/M2 | HEIGHT: 61 IN

## 2024-07-18 PROCEDURE — 64447 NJX AA&/STRD FEMORAL NRV IMG: CPT | Performed by: NURSE ANESTHETIST, CERTIFIED REGISTERED

## 2024-07-18 PROCEDURE — 3600000012 HC SURGERY LEVEL 2 ADDTL 15MIN: Performed by: ORTHOPAEDIC SURGERY

## 2024-07-18 PROCEDURE — 6360000002 HC RX W HCPCS: Performed by: NURSE ANESTHETIST, CERTIFIED REGISTERED

## 2024-07-18 PROCEDURE — 6370000000 HC RX 637 (ALT 250 FOR IP)

## 2024-07-18 PROCEDURE — 2580000003 HC RX 258

## 2024-07-18 PROCEDURE — 6370000000 HC RX 637 (ALT 250 FOR IP): Performed by: NURSE ANESTHETIST, CERTIFIED REGISTERED

## 2024-07-18 PROCEDURE — 3700000001 HC ADD 15 MINUTES (ANESTHESIA): Performed by: ORTHOPAEDIC SURGERY

## 2024-07-18 PROCEDURE — 7100000010 HC PHASE II RECOVERY - FIRST 15 MIN: Performed by: ORTHOPAEDIC SURGERY

## 2024-07-18 PROCEDURE — 73560 X-RAY EXAM OF KNEE 1 OR 2: CPT

## 2024-07-18 PROCEDURE — 3700000000 HC ANESTHESIA ATTENDED CARE: Performed by: ORTHOPAEDIC SURGERY

## 2024-07-18 PROCEDURE — 7100000011 HC PHASE II RECOVERY - ADDTL 15 MIN: Performed by: ORTHOPAEDIC SURGERY

## 2024-07-18 PROCEDURE — 3600000002 HC SURGERY LEVEL 2 BASE: Performed by: ORTHOPAEDIC SURGERY

## 2024-07-18 RX ORDER — SODIUM CHLORIDE 0.9 % (FLUSH) 0.9 %
5-40 SYRINGE (ML) INJECTION PRN
Status: DISCONTINUED | OUTPATIENT
Start: 2024-07-18 | End: 2024-07-18 | Stop reason: HOSPADM

## 2024-07-18 RX ORDER — PROPOFOL 10 MG/ML
INJECTION, EMULSION INTRAVENOUS PRN
Status: DISCONTINUED | OUTPATIENT
Start: 2024-07-18 | End: 2024-07-18 | Stop reason: SDUPTHER

## 2024-07-18 RX ORDER — MELOXICAM 7.5 MG/1
3.75 TABLET ORAL ONCE
Status: COMPLETED | OUTPATIENT
Start: 2024-07-18 | End: 2024-07-18

## 2024-07-18 RX ORDER — OXYCODONE AND ACETAMINOPHEN 5; 325 MG/1; MG/1
1 TABLET ORAL EVERY 6 HOURS PRN
Status: COMPLETED | OUTPATIENT
Start: 2024-07-18 | End: 2024-07-18

## 2024-07-18 RX ORDER — MIDAZOLAM HYDROCHLORIDE 1 MG/ML
INJECTION INTRAMUSCULAR; INTRAVENOUS
Status: COMPLETED | OUTPATIENT
Start: 2024-07-18 | End: 2024-07-18

## 2024-07-18 RX ORDER — SODIUM CHLORIDE, SODIUM LACTATE, POTASSIUM CHLORIDE, CALCIUM CHLORIDE 600; 310; 30; 20 MG/100ML; MG/100ML; MG/100ML; MG/100ML
INJECTION, SOLUTION INTRAVENOUS CONTINUOUS
Status: DISCONTINUED | OUTPATIENT
Start: 2024-07-18 | End: 2024-07-18 | Stop reason: HOSPADM

## 2024-07-18 RX ORDER — FENTANYL CITRATE 50 UG/ML
INJECTION, SOLUTION INTRAMUSCULAR; INTRAVENOUS
Status: COMPLETED | OUTPATIENT
Start: 2024-07-18 | End: 2024-07-18

## 2024-07-18 RX ORDER — BUPIVACAINE HYDROCHLORIDE 5 MG/ML
INJECTION, SOLUTION EPIDURAL; INTRACAUDAL
Status: COMPLETED | OUTPATIENT
Start: 2024-07-18 | End: 2024-07-18

## 2024-07-18 RX ADMIN — BUPIVACAINE HYDROCHLORIDE 20 ML: 5 INJECTION, SOLUTION EPIDURAL; INTRACAUDAL; PERINEURAL at 11:04

## 2024-07-18 RX ADMIN — MELOXICAM 3.75 MG: 7.5 TABLET ORAL at 10:36

## 2024-07-18 RX ADMIN — SODIUM CHLORIDE, POTASSIUM CHLORIDE, SODIUM LACTATE AND CALCIUM CHLORIDE: 600; 310; 30; 20 INJECTION, SOLUTION INTRAVENOUS at 10:23

## 2024-07-18 RX ADMIN — MIDAZOLAM 2 MG: 1 INJECTION INTRAMUSCULAR; INTRAVENOUS at 11:04

## 2024-07-18 RX ADMIN — PROPOFOL 100 MG: 10 INJECTION, EMULSION INTRAVENOUS at 11:26

## 2024-07-18 RX ADMIN — FENTANYL CITRATE 50 MCG: 50 INJECTION INTRAMUSCULAR; INTRAVENOUS at 11:26

## 2024-07-18 RX ADMIN — OXYCODONE HYDROCHLORIDE AND ACETAMINOPHEN 1 TABLET: 5; 325 TABLET ORAL at 12:04

## 2024-07-18 RX ADMIN — FENTANYL CITRATE 50 MCG: 50 INJECTION INTRAMUSCULAR; INTRAVENOUS at 11:04

## 2024-07-18 ASSESSMENT — PAIN DESCRIPTION - FREQUENCY
FREQUENCY: CONTINUOUS

## 2024-07-18 ASSESSMENT — PAIN - FUNCTIONAL ASSESSMENT
PAIN_FUNCTIONAL_ASSESSMENT: ACTIVITIES ARE NOT PREVENTED
PAIN_FUNCTIONAL_ASSESSMENT: 0-10
PAIN_FUNCTIONAL_ASSESSMENT: ACTIVITIES ARE NOT PREVENTED
PAIN_FUNCTIONAL_ASSESSMENT: ADULT NONVERBAL PAIN SCALE (NPVS)
PAIN_FUNCTIONAL_ASSESSMENT: ACTIVITIES ARE NOT PREVENTED

## 2024-07-18 ASSESSMENT — PAIN DESCRIPTION - ORIENTATION
ORIENTATION: RIGHT

## 2024-07-18 ASSESSMENT — PAIN DESCRIPTION - ONSET
ONSET: ON-GOING
ONSET: GRADUAL
ONSET: ON-GOING
ONSET: ON-GOING

## 2024-07-18 ASSESSMENT — PAIN DESCRIPTION - PAIN TYPE
TYPE: SURGICAL PAIN

## 2024-07-18 ASSESSMENT — PAIN DESCRIPTION - DESCRIPTORS
DESCRIPTORS: DULL;DISCOMFORT;SORE
DESCRIPTORS: ACHING
DESCRIPTORS: SORE

## 2024-07-18 ASSESSMENT — COPD QUESTIONNAIRES: CAT_SEVERITY: NO INTERVAL CHANGE

## 2024-07-18 ASSESSMENT — PAIN SCALES - GENERAL
PAINLEVEL_OUTOF10: 7
PAINLEVEL_OUTOF10: 9
PAINLEVEL_OUTOF10: 10
PAINLEVEL_OUTOF10: 8

## 2024-07-18 ASSESSMENT — PAIN DESCRIPTION - LOCATION
LOCATION: KNEE

## 2024-07-18 ASSESSMENT — ENCOUNTER SYMPTOMS: SHORTNESS OF BREATH: 1

## 2024-07-18 ASSESSMENT — PAIN DESCRIPTION - DIRECTION
RADIATING_TOWARDS: ANKLE
RADIATING_TOWARDS: ANKLE

## 2024-07-18 NOTE — ANESTHESIA POSTPROCEDURE EVALUATION
Department of Anesthesiology  Postprocedure Note    Patient: Radha Grimes  MRN: [id number]  YOB: 1950  Date of evaluation: 7/18/2024    Procedure Summary       Date: 07/18/24 Room / Location: [facility] 01 / [facility] MAIN OR    Anesthesia Start: 1121 Anesthesia Stop: 1142    Procedure: RIGHT KNEE MANIPULATION (Right: Knee) Diagnosis:       Contracture of right knee      (Contracture of right knee [M24.561])    Surgeons: Dar Austin MD Responsible Provider: Donnell Rae APRN - CRNA    Anesthesia Type: MAC ASA Status: 3            Anesthesia Type: MAC    Olivia Phase I:      Olivia Phase II:      Anesthesia Post Evaluation    Patient location during evaluation: bedside  Patient participation: complete - patient participated  Level of consciousness: awake and awake and alert  Pain score: 3  Airway patency: patent  Nausea & Vomiting: no nausea  Cardiovascular status: blood pressure returned to baseline  Respiratory status: acceptable  Hydration status: euvolemic  Multimodal analgesia pain management approach  Pain management: adequate    No notable events documented.

## 2024-07-18 NOTE — ANESTHESIA PROCEDURE NOTES
Peripheral Block    Patient location during procedure: holding area  Reason for block: post-op pain management and at surgeon's request  Start time: 7/18/2024 11:04 AM  End time: 7/18/2024 11:14 AM  Staffing  Performed: resident/CRNA   Resident/CRNA: Donnell Rae APRN - CRNA  Performed by: Donnell Rae APRN - CRNA  Authorized by: Donnell Rae APRN - CRNA    Preanesthetic Checklist  Completed: patient identified, IV checked, site marked, risks and benefits discussed, surgical/procedural consents, equipment checked, pre-op evaluation, timeout performed, anesthesia consent given, oxygen available, monitors applied/VS acknowledged, fire risk safety assessment completed and verbalized and blood product R/B/A discussed and consented  Peripheral Block   Patient position: supine  Prep: ChloraPrep  Provider prep: mask  Patient monitoring: cardiac monitor, continuous pulse ox, frequent blood pressure checks, IV access, oxygen and responsive to questions  Block type: Saphenous  Laterality: right  Injection technique: single-shot  Guidance: ultrasound guided    Needle   Needle type: Other   Needle gauge: 20 G  Needle localization: ultrasound guidance  Needle insertion depth: 4 cm  Test dose: negative  Needle length: 10 cmOther needle type: [id number] non-stimulating 20 G, 4 inch needle  Assessment   Injection assessment: negative aspiration for heme, no paresthesia on injection, local visualized surrounding nerve on ultrasound and no intravascular symptoms  Paresthesia pain: none  Slow fractionated injection: yes  Hemodynamics: stable  Outcomes: uncomplicated and patient tolerated procedure well    Medications Administered  fentaNYL (SUBLIMAZE) injection - IntraVENous   50 mcg - 7/18/2024 11:04:00 AM  midazolam (VERSED) injection 2 mg/2mL - IntraVENous   2 mg - 7/18/2024 11:04:00 AM  BUPivacaine (MARCAINE) PF injection 0.5% - Perineural   20 mL - 7/18/2024 11:04:00 AM

## 2024-07-18 NOTE — ANESTHESIA PRE PROCEDURE
Answered      Vital Signs (Current):   Vitals:    07/15/24 1041 07/18/24 1026   BP:  (!) 150/84   Pulse:  75   Resp:  15   Temp:  36.7 °C (98 °F)   TempSrc:  Temporal   SpO2:  95%   Weight: 79.4 kg (175 lb)    Height: 1.549 m (5' 1\")                                               BP Readings from Last 3 Encounters:   07/18/24 (!) 150/84   06/07/24 118/70   06/06/24 138/72       NPO Status: Time of last liquid consumption: 2300                        Time of last solid consumption: 2030                        Date of last liquid consumption: 07/17/24                        Date of last solid food consumption: 07/17/24    BMI:   Wt Readings from Last 3 Encounters:   07/15/24 79.4 kg (175 lb)   05/03/24 79.4 kg (175 lb)   04/26/24 79.4 kg (175 lb)     Body mass index is 33.07 kg/m².    CBC:   Lab Results   Component Value Date/Time    WBC 9.1 07/09/2024 10:39 AM    RBC 4.35 07/09/2024 10:39 AM    HGB 13.8 07/09/2024 10:39 AM    HCT 42.7 07/09/2024 10:39 AM    MCV 98.2 07/09/2024 10:39 AM    RDW 14.0 07/09/2024 10:39 AM     07/09/2024 10:39 AM       CMP:   Lab Results   Component Value Date/Time     05/10/2024 09:50 AM    K 4.3 05/10/2024 09:50 AM     05/10/2024 09:50 AM    CO2 30 05/10/2024 09:50 AM    BUN 14 05/10/2024 09:50 AM    CREATININE 1.09 05/10/2024 09:50 AM    GFRAA >60 06/19/2022 09:23 PM    AGRATIO 1.1 06/19/2022 09:23 PM    LABGLOM 53 05/10/2024 09:50 AM    LABGLOM 55 02/28/2024 10:36 AM    LABGLOM >60 10/28/2022 12:33 PM    GLUCOSE 109 05/10/2024 09:50 AM    CALCIUM 9.4 05/10/2024 09:50 AM    BILITOT 1.1 05/10/2024 09:50 AM    ALKPHOS 128 05/10/2024 09:50 AM    ALKPHOS 104 06/19/2022 09:23 PM    AST 33 05/10/2024 09:50 AM    ALT 26 05/10/2024 09:50 AM       POC Tests: No results for input(s): \"POCGLU\", \"POCNA\", \"POCK\", \"POCCL\", \"POCBUN\", \"POCHEMO\", \"POCHCT\" in the last 72 hours.    Coags:   Lab Results   Component Value Date/Time    PROTIME 13.4 05/10/2024 09:50 AM    INR 1.0 05/10/2024

## 2024-07-18 NOTE — OP NOTE
Operative Note    Patient: Radha Grimes MRN: [id number]  Surgery Date: 7/18/2024           Procedure  Primary Surgeon    RIGHT KNEE MANIPULATION  Dar Austin MD    * Panel 2 does not exist *  * Panel 2 does not exist *    * Panel 3 does not exist *  * Panel 3 does not exist *     Surgeon(s) and Role:     * Dar Austin MD - Primary    Other OR Staff/Assistants:  Circulator: Isabel Brown RN  First Assistant: Willa Cifuentes RN    1st Assistant Tasks:  Closing    Pre-operative Diagnosis: Arthrofibrosis of the knee    Post-operative Diagnosis: same as preop diagnosis    Anesthesia Type: General anesthesia    Findings: Arthrofibrosis    Complications: No    EBL: 0 cc    Specimens: None    Implants:   Implants       No active implants to display in this view.            Dar Austin MD    Operative procedure: Knee manipulation    Operative note: The affected knee was provided regional anesthesia and then the patient was put under general anesthesia.  Subsequently a timeout was performed.  After timeout was performed the knee was manipulated in both flexion and extension to achieve near full range of motion postmanipulation.  Postmanipulation fluoroscopy was utilized to document no iatrogenic fractures.  Patient was then taken to PACU in stable condition.

## 2024-07-26 ENCOUNTER — TELEMEDICINE (OUTPATIENT)
Age: 74
End: 2024-07-26

## 2024-07-26 DIAGNOSIS — Z96.651 STATUS POST TOTAL RIGHT KNEE REPLACEMENT: ICD-10-CM

## 2024-07-26 DIAGNOSIS — Z98.890 STATUS POST SURGICAL MANIPULATION OF KNEE JOINT: ICD-10-CM

## 2024-07-26 DIAGNOSIS — M25.561 RIGHT KNEE PAIN, UNSPECIFIED CHRONICITY: Primary | ICD-10-CM

## 2024-07-26 PROCEDURE — 99024 POSTOP FOLLOW-UP VISIT: CPT

## 2024-07-26 RX ORDER — PREDNISONE 20 MG/1
20 TABLET ORAL 2 TIMES DAILY
Qty: 20 TABLET | Refills: 0 | Status: SHIPPED | OUTPATIENT
Start: 2024-07-26 | End: 2024-08-05

## 2024-07-26 NOTE — PROGRESS NOTES
Radha Grimes (:  1950) is a Established patient, evaluated via audio/visual telemedicine on 2024    [facility] ORTHOPAEDICS AND SPORTS MEDICINE  [address], [address]  [facility] [address]  Dept: [phone]  Dept Fax: [phone]   Chief Complaint   Patient presents with    Post-Op Check     Right JOSIE    Knee Pain     right     Patient-Reported Vitals  No data recorded   Allergies   Allergen Reactions    Amitriptyline     Codeine Itching    Diphenhydramine Hives and Itching    Duloxetine Other (See Comments)     Shaking, feeling someone living inside my body, arms going numb.    Duloxetine Hcl     Gemfibrozil     Iodides Hives     IVP DYE    Iodinated Contrast Media Hives     Pt states severe hives     Lisinopril     Milnacipran     Pregabalin Other (See Comments)     Shaking, feeling someone living in my body, numb arm.  Any meds for fibromyialga  Other reaction(s): rash/itching    Sulfa Antibiotics      Other reaction(s): neurological reaction    Tetanus Toxoids Hives    Diphenhydramine-Zinc Acetate Rash and Other (See Comments)    Levofloxacin Nausea Only and Nausea And Vomiting    Sulfur Hives and Rash     Current Outpatient Medications   Medication Sig Dispense Refill    esomeprazole Magnesium (NEXIUM) 20 MG PACK Take 1 packet by mouth daily      acetaminophen (TYLENOL) 500 MG CAPS capsule Take 1 capsule by mouth every 8 hours as needed for Pain do not exceed 4000mg total acteaminophen per day      montelukast (SINGULAIR) 10 MG tablet Take 1 tablet by mouth nightly 30 tablet 5    albuterol sulfate HFA (PROVENTIL;VENTOLIN;PROAIR) 108 (90 Base) MCG/ACT inhaler Inhale 2 puffs into the lungs every 6 hours as needed for Wheezing 18 g 3    atorvastatin (LIPITOR) 80 MG tablet Take 1 tablet by mouth daily      atenolol (TENORMIN) 25 MG tablet Take 1 tablet by mouth daily      Multiple Vitamin (MULTIVITAMIN) capsule Take 1 capsule by mouth

## 2024-08-02 ENCOUNTER — TELEPHONE (OUTPATIENT)
Age: 74
End: 2024-08-02

## 2024-08-02 NOTE — TELEPHONE ENCOUNTER
Pt had a RT Knee Manipulation 07/18/2024 - Rt TKR 05/22/2024    She states her knee locks up and grabs when she is doing her exercises. She states Pt was only able to get her rom to 84 but yesterday it was only 81.    She has been taking the muscle relaxer 1 hr before pt.     She stated it is still swollen  but it is not red or warm.    She is wondering if she should be doing something else or what next steps she needs to take.

## 2024-08-08 ENCOUNTER — TELEMEDICINE (OUTPATIENT)
Age: 74
End: 2024-08-08

## 2024-08-08 DIAGNOSIS — G89.29 CHRONIC PAIN OF RIGHT KNEE: ICD-10-CM

## 2024-08-08 DIAGNOSIS — M25.561 CHRONIC PAIN OF RIGHT KNEE: ICD-10-CM

## 2024-08-08 DIAGNOSIS — Z98.890 STATUS POST SURGICAL MANIPULATION OF KNEE JOINT: Primary | ICD-10-CM

## 2024-08-08 PROCEDURE — 99024 POSTOP FOLLOW-UP VISIT: CPT

## 2024-08-08 NOTE — PROGRESS NOTES
Radha Grimes (:  1950) is a Established patient, evaluated via audio/visual telemedicine on 2024    [facility] ORTHOPAEDICS AND SPORTS MEDICINE  [address], [address]  [facility] [address]  Dept: [phone]  Dept Fax: [phone]   Chief Complaint   Patient presents with    Post-Op Check    Knee Pain     Rt Manipulation     Patient-Reported Vitals  No data recorded   Allergies   Allergen Reactions    Amitriptyline     Codeine Itching    Diphenhydramine Hives and Itching    Duloxetine Other (See Comments)     Shaking, feeling someone living inside my body, arms going numb.    Duloxetine Hcl     Gemfibrozil     Iodides Hives     IVP DYE    Iodinated Contrast Media Hives     Pt states severe hives     Lisinopril     Milnacipran     Pregabalin Other (See Comments)     Shaking, feeling someone living in my body, numb arm.  Any meds for fibromyialga  Other reaction(s): rash/itching    Sulfa Antibiotics      Other reaction(s): neurological reaction    Tetanus Toxoids Hives    Diphenhydramine-Zinc Acetate Rash and Other (See Comments)    Levofloxacin Nausea Only and Nausea And Vomiting    Sulfur Hives and Rash     Current Outpatient Medications   Medication Sig Dispense Refill    esomeprazole Magnesium (NEXIUM) 20 MG PACK Take 1 packet by mouth daily      acetaminophen (TYLENOL) 500 MG CAPS capsule Take 1 capsule by mouth every 8 hours as needed for Pain do not exceed 4000mg total acteaminophen per day      montelukast (SINGULAIR) 10 MG tablet Take 1 tablet by mouth nightly 30 tablet 5    albuterol sulfate HFA (PROVENTIL;VENTOLIN;PROAIR) 108 (90 Base) MCG/ACT inhaler Inhale 2 puffs into the lungs every 6 hours as needed for Wheezing 18 g 3    atorvastatin (LIPITOR) 80 MG tablet Take 1 tablet by mouth daily      atenolol (TENORMIN) 25 MG tablet Take 1 tablet by mouth daily      Multiple Vitamin (MULTIVITAMIN) capsule Take 1 capsule by mouth daily

## 2024-08-08 NOTE — PATIENT INSTRUCTIONS
stockings.     Carry a medical alert card that says you have an artificial joint. You have metal pieces in your knee. These may set off some airport metal detectors.   Follow-up care is a key part of your treatment and safety. Be sure to make and go to all appointments, and call your doctor if you are having problems. It's also a good idea to know your test results and keep a list of the medicines you take.  When should you call for help?   Call 911 anytime you think you may need emergency care. For example, call if:    You passed out (lost consciousness).     You have severe trouble breathing.     You have sudden chest pain and shortness of breath, or you cough up blood.   Call your doctor now or seek immediate medical care if:    You have signs of infection, such as:  Increased pain, swelling, warmth, or redness.  Red streaks leading from the incision.  Pus draining from the incision.  A fever.     You have signs of a blood clot, such as:  Pain in your calf, back of the knee, thigh, or groin.  Redness and swelling in your leg or groin.     Your incision comes open and begins to bleed, or the bleeding increases.     You have pain that does not get better after you take pain medicine.   Watch closely for changes in your health, and be sure to contact your doctor if:    You do not have a bowel movement after taking a laxative.   Where can you learn more?  Go to https://www.[employer].net/patientEd and enter T054 to learn more about \"Total Knee Replacement: What to Expect at Home.\"  Current as of: July 17, 2023  Content Version: 14.1  © 2006-2024 [employer].   Care instructions adapted under license by [email or web address]. If you have questions about a medical condition or this instruction, always ask your healthcare professional. [employer] disclaims any warranty or liability for your use of this information.

## 2024-08-09 ENCOUNTER — HOSPITAL ENCOUNTER (OUTPATIENT)
Facility: HOSPITAL | Age: 74
End: 2024-08-09
Payer: MEDICARE

## 2024-08-09 DIAGNOSIS — M25.561 CHRONIC PAIN OF RIGHT KNEE: ICD-10-CM

## 2024-08-09 DIAGNOSIS — Z98.890 STATUS POST SURGICAL MANIPULATION OF KNEE JOINT: ICD-10-CM

## 2024-08-09 DIAGNOSIS — G89.29 CHRONIC PAIN OF RIGHT KNEE: ICD-10-CM

## 2024-08-09 PROCEDURE — 73700 CT LOWER EXTREMITY W/O DYE: CPT

## 2024-08-12 ENCOUNTER — OFFICE VISIT (OUTPATIENT)
Age: 74
End: 2024-08-12

## 2024-08-12 VITALS — WEIGHT: 175 LBS | BODY MASS INDEX: 33.04 KG/M2 | HEIGHT: 61 IN

## 2024-08-12 DIAGNOSIS — M25.561 CHRONIC PAIN OF RIGHT KNEE: Primary | ICD-10-CM

## 2024-08-12 DIAGNOSIS — G89.29 CHRONIC PAIN OF RIGHT KNEE: Primary | ICD-10-CM

## 2024-08-12 PROCEDURE — 99024 POSTOP FOLLOW-UP VISIT: CPT | Performed by: ORTHOPAEDIC SURGERY

## 2024-08-12 NOTE — PROGRESS NOTES
Name: Radha Grimes    : 1950     [facility] ORTHOPAEDICS AND SPORTS MEDICINE  [address], [address]  [facility] [address]  Dept: [phone]  Dept Fax: [phone]     Chief Complaint   Patient presents with    Post-Op Check    Knee Pain     Right        Ht 1.549 m (5' 1\")   Wt 79.4 kg (175 lb)   BMI 33.07 kg/m²      Allergies   Allergen Reactions    Amitriptyline     Codeine Itching    Diphenhydramine Hives and Itching    Duloxetine Other (See Comments)     Shaking, feeling someone living inside my body, arms going numb.    Duloxetine Hcl     Gemfibrozil     Iodides Hives     IVP DYE    Iodinated Contrast Media Hives     Pt states severe hives     Lisinopril     Milnacipran     Pregabalin Other (See Comments)     Shaking, feeling someone living in my body, numb arm.  Any meds for fibromyialga  Other reaction(s): rash/itching    Sulfa Antibiotics      Other reaction(s): neurological reaction    Tetanus Toxoids Hives    Diphenhydramine-Zinc Acetate Rash and Other (See Comments)    Levofloxacin Nausea Only and Nausea And Vomiting    Sulfur Hives and Rash        Current Outpatient Medications   Medication Sig Dispense Refill    esomeprazole Magnesium (NEXIUM) 20 MG PACK Take 1 packet by mouth daily      acetaminophen (TYLENOL) 500 MG CAPS capsule Take 1 capsule by mouth every 8 hours as needed for Pain do not exceed 4000mg total acteaminophen per day      montelukast (SINGULAIR) 10 MG tablet Take 1 tablet by mouth nightly 30 tablet 5    albuterol sulfate HFA (PROVENTIL;VENTOLIN;PROAIR) 108 (90 Base) MCG/ACT inhaler Inhale 2 puffs into the lungs every 6 hours as needed for Wheezing 18 g 3    atorvastatin (LIPITOR) 80 MG tablet Take 1 tablet by mouth daily      atenolol (TENORMIN) 25 MG tablet Take 1 tablet by mouth daily      Multiple Vitamin (MULTIVITAMIN) capsule Take 1 capsule by mouth daily      cyclobenzaprine (FLEXERIL) 5 MG tablet

## 2024-09-27 ENCOUNTER — TELEPHONE (OUTPATIENT)
Age: 74
End: 2024-09-27

## 2024-10-04 ENCOUNTER — OFFICE VISIT (OUTPATIENT)
Age: 74
End: 2024-10-04

## 2024-10-04 DIAGNOSIS — G89.29 CHRONIC PAIN OF RIGHT KNEE: Primary | ICD-10-CM

## 2024-10-04 DIAGNOSIS — M25.561 CHRONIC PAIN OF RIGHT KNEE: Primary | ICD-10-CM

## 2024-10-04 PROCEDURE — 99024 POSTOP FOLLOW-UP VISIT: CPT | Performed by: ORTHOPAEDIC SURGERY

## 2024-10-04 NOTE — PATIENT INSTRUCTIONS
as instructed.  Follow your doctor's instructions about activity during your healing process. If you can do mild exercise, slowly increase your activity.  Stay at a healthy weight. Extra weight can strain the joints, especially the knees and hips, and make the pain worse. Losing a few pounds may help.  When should you call for help?   Call 911 anytime you think you may need emergency care. For example, call if:    You have symptoms of a blood clot in your lung (called a pulmonary embolism). These may include:  Sudden chest pain.  Trouble breathing.  Coughing up blood.   Call your doctor now or seek immediate medical care if:    You have severe or increasing pain.     Your leg or foot turns cold or changes color.     You cannot stand or put weight on your knee.     Your knee looks twisted or bent out of shape.     You cannot move your knee.     You have signs of infection, such as:  Increased pain, swelling, warmth, or redness.  Red streaks leading from the knee.  Pus draining from a place on your knee.  A fever.     You have signs of a blood clot in your leg (called a deep vein thrombosis), such as:  Pain in your calf, back of the knee, thigh, or groin.  Redness and swelling in your leg or groin.   Watch closely for changes in your health, and be sure to contact your doctor if:    You have tingling, weakness, or numbness in your knee.     You have any new symptoms, such as swelling.     You have bruises from a knee injury that last longer than 2 weeks.     You do not get better as expected.   Where can you learn more?  Go to https://www.[employer].net/patientEd and enter K195 to learn more about \"Knee Pain or Injury: Care Instructions.\"  Current as of: March 9, 2022               Content Version: 13.5  © 1702-0065 [employer].   Care instructions adapted under license by [employer]. If you have questions about a medical condition or this instruction, always ask your healthcare professional.

## 2024-10-04 NOTE — PROGRESS NOTES
Name: Radha Grimes    : 1950     [facility] ORTHOPEDICS & [facility]ICINE [facility] [place]  [address], [address]  [facility] [address]  Dept: [phone]  Dept Fax: [phone]     Chief Complaint   Patient presents with    Post-Op Check    Knee Pain     RTKR        There were no vitals taken for this visit.     Allergies   Allergen Reactions    Amitriptyline     Codeine Itching    Diphenhydramine Hives and Itching    Duloxetine Other (See Comments)     Shaking, feeling someone living inside my body, arms going numb.    Duloxetine Hcl     Gemfibrozil     Iodides Hives     IVP DYE    Iodinated Contrast Media Hives     Pt states severe hives     Lisinopril     Milnacipran     Pregabalin Other (See Comments)     Shaking, feeling someone living in my body, numb arm.  Any meds for fibromyialga  Other reaction(s): rash/itching    Sulfa Antibiotics      Other reaction(s): neurological reaction    Tetanus Toxoids Hives    Diphenhydramine-Zinc Acetate Rash and Other (See Comments)    Levofloxacin Nausea Only and Nausea And Vomiting    Sulfur Hives and Rash        Current Outpatient Medications   Medication Sig Dispense Refill    esomeprazole Magnesium (NEXIUM) 20 MG PACK Take 1 packet by mouth daily      acetaminophen (TYLENOL) 500 MG CAPS capsule Take 1 capsule by mouth every 8 hours as needed for Pain do not exceed 4000mg total acteaminophen per day      montelukast (SINGULAIR) 10 MG tablet Take 1 tablet by mouth nightly 30 tablet 5    albuterol sulfate HFA (PROVENTIL;VENTOLIN;PROAIR) 108 (90 Base) MCG/ACT inhaler Inhale 2 puffs into the lungs every 6 hours as needed for Wheezing 18 g 3    atorvastatin (LIPITOR) 80 MG tablet Take 1 tablet by mouth daily      atenolol (TENORMIN) 25 MG tablet Take 1 tablet by mouth daily      Multiple Vitamin (MULTIVITAMIN) capsule Take 1 capsule by mouth daily      cyclobenzaprine (FLEXERIL) 5 MG tablet Take 1 tablet by

## 2025-02-07 ENCOUNTER — TELEPHONE (OUTPATIENT)
Age: 75
End: 2025-02-07

## 2025-02-07 DIAGNOSIS — J84.10 PULMONARY FIBROSIS (HCC): Primary | ICD-10-CM

## 2025-02-07 DIAGNOSIS — I71.40 AAA (ABDOMINAL AORTIC ANEURYSM) (HCC): Primary | ICD-10-CM

## 2025-02-07 NOTE — TELEPHONE ENCOUNTER
Pt stated that [facility] needs a new order faxed over for her to get her supplies. Fax # [phone]

## 2025-02-11 DIAGNOSIS — I71.40 AAA (ABDOMINAL AORTIC ANEURYSM) (HCC): ICD-10-CM

## 2025-02-11 DIAGNOSIS — R91.1 INCIDENTAL PULMONARY NODULE, > 3MM AND < 8MM: ICD-10-CM

## 2025-02-13 ENCOUNTER — TELEPHONE (OUTPATIENT)
Age: 75
End: 2025-02-13

## 2025-02-13 NOTE — TELEPHONE ENCOUNTER
Called to inform pt that orders were sent over on 02/07/25 but I did send over order, and most recent office notes again today. Fax confirmation was received. Instructed pt to call [facility] by tomorrow to verify that they've received the order.

## 2025-02-13 NOTE — TELEPHONE ENCOUNTER
Pt stated that [facility] told her that our office did not fax over the O2 supplies order. Please advise [phone]

## 2025-03-11 ENCOUNTER — HOSPITAL ENCOUNTER (OUTPATIENT)
Facility: HOSPITAL | Age: 75
Discharge: HOME | End: 2025-03-14
Payer: MEDICARE

## 2025-03-11 LAB
ALBUMIN SERPL-MCNC: 4.4 G/DL (ref 3.4–5)
ALBUMIN/GLOB SERPL: 1.2 (ref 0.8–1.7)
ALP SERPL-CCNC: 128 U/L (ref 45–117)
ALT SERPL-CCNC: 23 U/L (ref 13–56)
ANION GAP SERPL CALC-SCNC: 12 MMOL/L (ref 3–18)
AST SERPL-CCNC: 31 U/L (ref 10–38)
BASOPHILS # BLD: 0.06 K/UL (ref 0–0.1)
BASOPHILS NFR BLD: 1 % (ref 0–2)
BILIRUB SERPL-MCNC: 0.7 MG/DL (ref 0.2–1)
BUN SERPL-MCNC: 15 MG/DL (ref 7–18)
BUN/CREAT SERPL: 15 (ref 12–20)
CALCIUM SERPL-MCNC: 9.6 MG/DL (ref 8.5–10.1)
CHLORIDE SERPL-SCNC: 102 MMOL/L (ref 100–111)
CHOLEST SERPL-MCNC: 141 MG/DL
CO2 SERPL-SCNC: 25 MMOL/L (ref 21–32)
CREAT SERPL-MCNC: 1.02 MG/DL (ref 0.6–1.3)
DIFFERENTIAL METHOD BLD: NORMAL
EOSINOPHIL # BLD: 0.21 K/UL (ref 0–0.4)
EOSINOPHIL NFR BLD: 3.4 % (ref 0–5)
ERYTHROCYTE [DISTWIDTH] IN BLOOD BY AUTOMATED COUNT: 14.2 % (ref 11.6–14.5)
GLOBULIN SER CALC-MCNC: 3.8 G/DL (ref 2–4)
GLUCOSE SERPL-MCNC: 96 MG/DL (ref 74–99)
HCT VFR BLD AUTO: 43.4 % (ref 35–45)
HDLC SERPL-MCNC: 69 MG/DL (ref 40–60)
HDLC SERPL: 2 (ref 0–5)
HGB BLD-MCNC: 14.2 G/DL (ref 12–16)
IMM GRANULOCYTES # BLD AUTO: 0.02 K/UL (ref 0–0.04)
IMM GRANULOCYTES NFR BLD AUTO: 0.3 % (ref 0–0.5)
LDLC SERPL CALC-MCNC: 45.8 MG/DL (ref 0–100)
LIPID PANEL: ABNORMAL
LYMPHOCYTES # BLD: 1.56 K/UL (ref 0.9–3.6)
LYMPHOCYTES NFR BLD: 25.4 % (ref 21–52)
MCH RBC QN AUTO: 31.6 PG (ref 24–34)
MCHC RBC AUTO-ENTMCNC: 32.7 G/DL (ref 31–37)
MCV RBC AUTO: 96.4 FL (ref 78–100)
MONOCYTES # BLD: 0.48 K/UL (ref 0.05–1.2)
MONOCYTES NFR BLD: 7.8 % (ref 3–10)
NEUTS SEG # BLD: 3.8 K/UL (ref 1.8–8)
NEUTS SEG NFR BLD: 62.1 % (ref 40–73)
NRBC # BLD: 0 K/UL (ref 0–0.01)
NRBC BLD-RTO: 0 PER 100 WBC
PLATELET # BLD AUTO: 198 K/UL (ref 135–420)
PMV BLD AUTO: 11.7 FL (ref 9.2–11.8)
POTASSIUM SERPL-SCNC: 3.8 MMOL/L (ref 3.5–5.5)
PROT SERPL-MCNC: 8.2 G/DL (ref 6.4–8.2)
RBC # BLD AUTO: 4.5 M/UL (ref 4.2–5.3)
SODIUM SERPL-SCNC: 139 MMOL/L (ref 136–145)
T4 FREE SERPL-MCNC: 1.2 NG/DL (ref 0.7–1.5)
TRIGL SERPL-MCNC: 131 MG/DL
TSH SERPL DL<=0.05 MIU/L-ACNC: 0.68 UIU/ML (ref 0.36–3.74)
VLDLC SERPL CALC-MCNC: 26.2 MG/DL
WBC # BLD AUTO: 6.1 K/UL (ref 4.6–13.2)

## 2025-03-11 PROCEDURE — 84443 ASSAY THYROID STIM HORMONE: CPT

## 2025-03-11 PROCEDURE — 85025 COMPLETE CBC W/AUTO DIFF WBC: CPT

## 2025-03-11 PROCEDURE — 80053 COMPREHEN METABOLIC PANEL: CPT

## 2025-03-11 PROCEDURE — 84439 ASSAY OF FREE THYROXINE: CPT

## 2025-03-11 PROCEDURE — 36415 COLL VENOUS BLD VENIPUNCTURE: CPT

## 2025-03-11 PROCEDURE — 80061 LIPID PANEL: CPT

## 2025-03-20 ENCOUNTER — HOSPITAL ENCOUNTER (OUTPATIENT)
Facility: HOSPITAL | Age: 75
Discharge: HOME | End: 2025-03-23
Attending: FAMILY MEDICINE
Payer: MEDICARE

## 2025-03-20 VITALS — WEIGHT: 172 LBS | BODY MASS INDEX: 31.65 KG/M2 | HEIGHT: 62 IN

## 2025-03-20 DIAGNOSIS — Z12.31 VISIT FOR SCREENING MAMMOGRAM: ICD-10-CM

## 2025-03-20 PROCEDURE — 77063 BREAST TOMOSYNTHESIS BI: CPT

## 2025-04-11 ENCOUNTER — HOSPITAL ENCOUNTER (OUTPATIENT)
Facility: HOSPITAL | Age: 75
Discharge: HOME | End: 2025-04-14
Attending: INTERNAL MEDICINE

## 2025-04-11 ENCOUNTER — HOSPITAL ENCOUNTER (OUTPATIENT)
Facility: HOSPITAL | Age: 75
Discharge: HOME | End: 2025-04-14
Attending: SURGERY
Payer: MEDICARE

## 2025-04-11 DIAGNOSIS — I71.40 AAA (ABDOMINAL AORTIC ANEURYSM): ICD-10-CM

## 2025-04-11 DIAGNOSIS — Z87.891 PERSONAL HISTORY OF TOBACCO USE: ICD-10-CM

## 2025-04-11 DIAGNOSIS — R91.1 INCIDENTAL PULMONARY NODULE, > 3MM AND < 8MM: ICD-10-CM

## 2025-04-11 PROCEDURE — 74176 CT ABD & PELVIS W/O CONTRAST: CPT

## 2025-04-15 ENCOUNTER — OFFICE VISIT (OUTPATIENT)
Age: 75
End: 2025-04-15
Payer: MEDICARE

## 2025-04-15 VITALS
DIASTOLIC BLOOD PRESSURE: 67 MMHG | WEIGHT: 172.4 LBS | RESPIRATION RATE: 16 BRPM | BODY MASS INDEX: 31.53 KG/M2 | SYSTOLIC BLOOD PRESSURE: 142 MMHG | HEART RATE: 70 BPM | TEMPERATURE: 97.3 F | OXYGEN SATURATION: 98 %

## 2025-04-15 DIAGNOSIS — Z87.891 FORMER SMOKER: ICD-10-CM

## 2025-04-15 DIAGNOSIS — J84.10 PULMONARY FIBROSIS (HCC): ICD-10-CM

## 2025-04-15 DIAGNOSIS — R91.8 LUNG NODULES: ICD-10-CM

## 2025-04-15 DIAGNOSIS — G47.34 NOCTURNAL HYPOXEMIA: ICD-10-CM

## 2025-04-15 DIAGNOSIS — J45.909 ASTHMA, UNSPECIFIED ASTHMA SEVERITY, UNSPECIFIED WHETHER COMPLICATED, UNSPECIFIED WHETHER PERSISTENT: Primary | ICD-10-CM

## 2025-04-15 DIAGNOSIS — J43.9 PULMONARY EMPHYSEMA, UNSPECIFIED EMPHYSEMA TYPE (HCC): ICD-10-CM

## 2025-04-15 PROCEDURE — 1123F ACP DISCUSS/DSCN MKR DOCD: CPT | Performed by: INTERNAL MEDICINE

## 2025-04-15 PROCEDURE — G8427 DOCREV CUR MEDS BY ELIG CLIN: HCPCS | Performed by: INTERNAL MEDICINE

## 2025-04-15 PROCEDURE — 3077F SYST BP >= 140 MM HG: CPT | Performed by: INTERNAL MEDICINE

## 2025-04-15 PROCEDURE — 1036F TOBACCO NON-USER: CPT | Performed by: INTERNAL MEDICINE

## 2025-04-15 PROCEDURE — 1090F PRES/ABSN URINE INCON ASSESS: CPT | Performed by: INTERNAL MEDICINE

## 2025-04-15 PROCEDURE — G8399 PT W/DXA RESULTS DOCUMENT: HCPCS | Performed by: INTERNAL MEDICINE

## 2025-04-15 PROCEDURE — 3023F SPIROM DOC REV: CPT | Performed by: INTERNAL MEDICINE

## 2025-04-15 PROCEDURE — 1160F RVW MEDS BY RX/DR IN RCRD: CPT | Performed by: INTERNAL MEDICINE

## 2025-04-15 PROCEDURE — 1159F MED LIST DOCD IN RCRD: CPT | Performed by: INTERNAL MEDICINE

## 2025-04-15 PROCEDURE — 3017F COLORECTAL CA SCREEN DOC REV: CPT | Performed by: INTERNAL MEDICINE

## 2025-04-15 PROCEDURE — 99214 OFFICE O/P EST MOD 30 MIN: CPT | Performed by: INTERNAL MEDICINE

## 2025-04-15 PROCEDURE — G8417 CALC BMI ABV UP PARAM F/U: HCPCS | Performed by: INTERNAL MEDICINE

## 2025-04-15 PROCEDURE — 1126F AMNT PAIN NOTED NONE PRSNT: CPT | Performed by: INTERNAL MEDICINE

## 2025-04-15 PROCEDURE — 3078F DIAST BP <80 MM HG: CPT | Performed by: INTERNAL MEDICINE

## 2025-04-15 RX ORDER — CETIRIZINE HYDROCHLORIDE 10 MG/1
10 TABLET ORAL NIGHTLY
COMMUNITY
Start: 2025-03-19

## 2025-04-15 RX ORDER — OMEPRAZOLE 20 MG/1
20 CAPSULE, DELAYED RELEASE ORAL DAILY
COMMUNITY

## 2025-04-15 ASSESSMENT — ENCOUNTER SYMPTOMS
BACK PAIN: 0
ABDOMINAL PAIN: 0
PHOTOPHOBIA: 0
SORE THROAT: 0
VOICE CHANGE: 0
EYE REDNESS: 0
EYE ITCHING: 0
CONSTIPATION: 0
STRIDOR: 0
CHEST TIGHTNESS: 0
EYE DISCHARGE: 0
NAUSEA: 0
TROUBLE SWALLOWING: 0
DIARRHEA: 0
WHEEZING: 0
CHOKING: 0
BLOOD IN STOOL: 0
VOMITING: 0
COUGH: 0
SHORTNESS OF BREATH: 1
EYE PAIN: 0
COLOR CHANGE: 0

## 2025-04-15 NOTE — PROGRESS NOTES
Radha Grimes (:  1950) is a 74 y.o. female,Established patient, here for evaluation of the following chief complaint(s):  Follow-up (Asthma, unspecified asthma severity, unspecified whether complicated, unspecified whether persistent/Nocturnal hypoxemia)      Assessment & Plan   1. Asthma, unspecified asthma severity, unspecified whether complicated, unspecified whether persistent  2. Pulmonary fibrosis (HCC)  3. Former smoker  4. Lung nodules  5. Nocturnal hypoxemia  6. Pulmonary emphysema, unspecified emphysema type (HCC)      Pt is at low risk of post op pulmonary complications (PPC), with ARISCAT score of 19, conveying a risk of 1.6%.  Pt encouraged to participate in bronchial hygiene and incentive spirometry maneuvers.  Early mobilization is encouraged.  Continue prn Albuterol at same dose, may use perioperatively as well.  Pt to resume outpatient pulmonary rehab as soon as allowed by Ortho.  Pt with pulmonary fibrosis, mild and stable.   Lung nodules are likewise stable.  No specific CT follow up indicated for above but will continue annual LDCT screen.  Pt will continue to benefit from supplemental O2 HS. No indication for daytime supplemental O2.   Return in about 6 months (around 10/15/2025).       Subjective   Pt is a former smoker who was previously followed by Dr Austin for post COVID dyspnea and pulmonary hypertension. She presents today for preoperative pulmonary evaluation prior to planned R TKS revision, as prior Jiffy knee had unfavorable results.  She has been doing well in outpatient pulmonary rehab when she fell and sustained a R knee injury requiring knee replacement. Pain continued despite TKA and pt consulted at [facility] for second opinion.   At baseline, pt C/o shortness of breath with moderate to severe exertion only. Activity is also limited by R knee pain. She denies chest pain, cough or hemoptysis. Pt with nocturnal hypoxemia, unclear cause, and uses supplemental O2 HS. Today's

## 2025-04-15 NOTE — PROGRESS NOTES
Radha Grimes presents today for   Chief Complaint   Patient presents with    Follow-up     Asthma, unspecified asthma severity, unspecified whether complicated, unspecified whether persistent  Nocturnal hypoxemia       Is someone accompanying this pt? No    Is the patient using any DME equipment during OV? Oxygen    [employer]    Depression Screenin/3/2024    10:46 AM   PHQ-9 Questionaire   Little interest or pleasure in doing things 0   Feeling down, depressed, or hopeless 0   PHQ-9 Total Score 0       Learning Needs Questionnaire:     No question data found.      Fall Risk:         5/3/2024    10:46 AM 2023     9:56 AM 2023    10:29 AM   Fall Risk   Do you feel unsteady or are you worried about falling?  no no no   2 or more falls in past year? no no no   Fall with injury in past year? no no no        Abuse Screening:          No data to display                  Coordination of Care:    1. Have you been to the ER, urgent care clinic since your last visit? Hospitalized since your last visit? No    2. Have you seen or consulted any other health care providers outside of the [facility] since your last visit? Include any pap smears or colon screening. PCP    Medication list has been update per patient.

## 2025-04-16 ENCOUNTER — OFFICE VISIT (OUTPATIENT)
Age: 75
End: 2025-04-16
Payer: MEDICARE

## 2025-04-16 VITALS
HEART RATE: 62 BPM | OXYGEN SATURATION: 95 % | DIASTOLIC BLOOD PRESSURE: 62 MMHG | HEIGHT: 62 IN | WEIGHT: 172 LBS | SYSTOLIC BLOOD PRESSURE: 102 MMHG | BODY MASS INDEX: 31.65 KG/M2

## 2025-04-16 DIAGNOSIS — I71.43 INFRARENAL ABDOMINAL AORTIC ANEURYSM (AAA) WITHOUT RUPTURE: Primary | ICD-10-CM

## 2025-04-16 PROCEDURE — 3074F SYST BP LT 130 MM HG: CPT | Performed by: SURGERY

## 2025-04-16 PROCEDURE — G8399 PT W/DXA RESULTS DOCUMENT: HCPCS | Performed by: SURGERY

## 2025-04-16 PROCEDURE — G8428 CUR MEDS NOT DOCUMENT: HCPCS | Performed by: SURGERY

## 2025-04-16 PROCEDURE — 99214 OFFICE O/P EST MOD 30 MIN: CPT | Performed by: SURGERY

## 2025-04-16 PROCEDURE — 1036F TOBACCO NON-USER: CPT | Performed by: SURGERY

## 2025-04-16 PROCEDURE — G8417 CALC BMI ABV UP PARAM F/U: HCPCS | Performed by: SURGERY

## 2025-04-16 PROCEDURE — 1090F PRES/ABSN URINE INCON ASSESS: CPT | Performed by: SURGERY

## 2025-04-16 PROCEDURE — 3078F DIAST BP <80 MM HG: CPT | Performed by: SURGERY

## 2025-04-16 PROCEDURE — 1123F ACP DISCUSS/DSCN MKR DOCD: CPT | Performed by: SURGERY

## 2025-04-16 PROCEDURE — 3017F COLORECTAL CA SCREEN DOC REV: CPT | Performed by: SURGERY

## 2025-04-16 NOTE — PROGRESS NOTES
[facility]    Vascular Surgery Office Visit    Radha Grimes  Chief Complaint   Patient presents with    Thoracic aortic aneurysm, without rupture     Follow up with CTA       History:  74 y.o. female with a known descending thoracic aortic aneurysm and an ascending thoracic aortic aneurysm returns for annual follow up. She was last seen in our practice in May 2024, approx 11 mos ago. She was recommended for ongoing annual noncon CT ch/a/p surveillance studies.     She returns today and reports feeling well. She denies abdominal and back pain. No claudication or rest pain. She is a former smoker.     Physical Exam:    /62   Pulse 62   Ht 1.575 m (5' 2\")   Wt 78 kg (172 lb)   SpO2 95%   BMI 31.46 kg/m²      Constitutional:  Patient is well developed, well nourished, and not distressed.   HEENT: Atraumatic, normocephalic. No carotid bruits appreciated.  Cardiovascular:  Normal rate, regular rhythm, normal heart sounds.  Pulmonary/Chest: Effort normal and breath sounds normal.    Abdominal:   Soft, non-distended. No tenderness to palpation.   Extremities: BLE warm  Neurological:  she  is alert and oriented x3. Motor & sensory grossly intact in all 4 limbs.       Imaging/Studies:   April 2025  CT ch/a/p without contrast: Ascending aorta 2.7 x 3.2cm. Descending thoracic aorta 3.9cm. Infrarenal AAA 3.3 cm. No incidental findings. We reviewed the report and the images together.       Impression:  Small ascending aorta aneurysm 2.7 cm  Small descending thoracic aortic aneurysm 3.9cm  Small infrarenal AAA 3.3cm    Plan:  F/u in 1 year with a repeat noncontrast CT ch/a/p (ordered for April 2026).       Jose C Gary MD  Vascular Surgeon  [facility]      I spent approximately 30 minutes on this patient encounter, which includes but is not limited to performing a history and physical exam, reviewing primary care and consultant

## 2025-04-25 ENCOUNTER — TELEPHONE (OUTPATIENT)
Age: 75
End: 2025-04-25

## 2025-04-25 ENCOUNTER — CLINICAL DOCUMENTATION (OUTPATIENT)
Age: 75
End: 2025-04-25

## 2025-04-28 ENCOUNTER — TELEPHONE (OUTPATIENT)
Age: 75
End: 2025-04-28

## 2025-05-02 ENCOUNTER — OFFICE VISIT (OUTPATIENT)
Age: 75
End: 2025-05-02
Payer: MEDICARE

## 2025-05-02 VITALS
HEART RATE: 62 BPM | DIASTOLIC BLOOD PRESSURE: 86 MMHG | HEIGHT: 62 IN | BODY MASS INDEX: 31.47 KG/M2 | OXYGEN SATURATION: 95 % | WEIGHT: 171 LBS | SYSTOLIC BLOOD PRESSURE: 128 MMHG

## 2025-05-02 DIAGNOSIS — I25.10 ATHEROSCLEROSIS OF NATIVE CORONARY ARTERY OF NATIVE HEART WITHOUT ANGINA PECTORIS: Primary | ICD-10-CM

## 2025-05-02 DIAGNOSIS — E78.00 PURE HYPERCHOLESTEROLEMIA, UNSPECIFIED: ICD-10-CM

## 2025-05-02 DIAGNOSIS — I71.9 AORTIC ANEURYSM WITHOUT RUPTURE, UNSPECIFIED PORTION OF AORTA: ICD-10-CM

## 2025-05-02 DIAGNOSIS — I10 ESSENTIAL (PRIMARY) HYPERTENSION: ICD-10-CM

## 2025-05-02 PROCEDURE — 93000 ELECTROCARDIOGRAM COMPLETE: CPT | Performed by: INTERNAL MEDICINE

## 2025-05-02 PROCEDURE — 3074F SYST BP LT 130 MM HG: CPT | Performed by: INTERNAL MEDICINE

## 2025-05-02 PROCEDURE — 1160F RVW MEDS BY RX/DR IN RCRD: CPT | Performed by: INTERNAL MEDICINE

## 2025-05-02 PROCEDURE — 1126F AMNT PAIN NOTED NONE PRSNT: CPT | Performed by: INTERNAL MEDICINE

## 2025-05-02 PROCEDURE — 99214 OFFICE O/P EST MOD 30 MIN: CPT | Performed by: INTERNAL MEDICINE

## 2025-05-02 PROCEDURE — 1036F TOBACCO NON-USER: CPT | Performed by: INTERNAL MEDICINE

## 2025-05-02 PROCEDURE — 1123F ACP DISCUSS/DSCN MKR DOCD: CPT | Performed by: INTERNAL MEDICINE

## 2025-05-02 PROCEDURE — 1159F MED LIST DOCD IN RCRD: CPT | Performed by: INTERNAL MEDICINE

## 2025-05-02 PROCEDURE — 3079F DIAST BP 80-89 MM HG: CPT | Performed by: INTERNAL MEDICINE

## 2025-05-02 PROCEDURE — 1090F PRES/ABSN URINE INCON ASSESS: CPT | Performed by: INTERNAL MEDICINE

## 2025-05-02 PROCEDURE — 3017F COLORECTAL CA SCREEN DOC REV: CPT | Performed by: INTERNAL MEDICINE

## 2025-05-02 PROCEDURE — G8427 DOCREV CUR MEDS BY ELIG CLIN: HCPCS | Performed by: INTERNAL MEDICINE

## 2025-05-02 PROCEDURE — G8417 CALC BMI ABV UP PARAM F/U: HCPCS | Performed by: INTERNAL MEDICINE

## 2025-05-02 PROCEDURE — G8399 PT W/DXA RESULTS DOCUMENT: HCPCS | Performed by: INTERNAL MEDICINE

## 2025-05-02 ASSESSMENT — ANXIETY QUESTIONNAIRES
1. FEELING NERVOUS, ANXIOUS, OR ON EDGE: NOT AT ALL
5. BEING SO RESTLESS THAT IT IS HARD TO SIT STILL: NOT AT ALL
6. BECOMING EASILY ANNOYED OR IRRITABLE: NOT AT ALL
GAD7 TOTAL SCORE: 0
7. FEELING AFRAID AS IF SOMETHING AWFUL MIGHT HAPPEN: NOT AT ALL
3. WORRYING TOO MUCH ABOUT DIFFERENT THINGS: NOT AT ALL
2. NOT BEING ABLE TO STOP OR CONTROL WORRYING: NOT AT ALL
4. TROUBLE RELAXING: NOT AT ALL

## 2025-05-02 ASSESSMENT — PATIENT HEALTH QUESTIONNAIRE - PHQ9
1. LITTLE INTEREST OR PLEASURE IN DOING THINGS: NOT AT ALL
SUM OF ALL RESPONSES TO PHQ QUESTIONS 1-9: 0
2. FEELING DOWN, DEPRESSED OR HOPELESS: NOT AT ALL

## 2025-05-02 ASSESSMENT — ENCOUNTER SYMPTOMS
ABDOMINAL PAIN: 0
NAUSEA: 0
VOMITING: 0
COUGH: 0
ABDOMINAL DISTENTION: 0
SHORTNESS OF BREATH: 0
SORE THROAT: 0

## 2025-05-02 NOTE — PROGRESS NOTES
Radha Grimes presents today for   Chief Complaint   Patient presents with    Follow-up     1 year       Radha Grimes preferred language for health care discussion is english/other.    Is someone accompanying this pt? no    Is the patient using any DME equipment during OV? no    Depression Screening:  Depression: Not at risk (5/2/2025)    PHQ-2     PHQ-2 Score: 0        Learning Assessment:  Who is the primary learner? Patient    What is the preferred language for health care of the primary learner? ENGLISH    How does the primary learner prefer to learn new concepts? DEMONSTRATION    Answered By patient    Relationship to Learner SELF           Pt currently taking Anticoagulant therapy? no    Pt currently taking Antiplatelet therapy ? Aspirin 81 mg daily      Coordination of Care:  1. Have you been to the ER, urgent care clinic since your last visit? Hospitalized since your last visit? no    2. Have you seen or consulted any other health care providers outside of the [facility] since your last visit? Include any pap smears or colon screening. no    
Site: Left Upper Arm, Patient Position: Sitting, BP Cuff Size: Medium Adult)   Pulse 62   Ht 1.575 m (5' 2\")   Wt 77.6 kg (171 lb)   SpO2 95%   BMI 31.28 kg/m²     Objective:   Physical Exam  Constitutional:       General: She is not in acute distress.  HENT:      Head: Normocephalic.   Neck:      Vascular: No carotid bruit or JVD.   Cardiovascular:      Rate and Rhythm: Normal rate and regular rhythm. No extrasystoles are present.     Heart sounds: No murmur heard.     No gallop.   Pulmonary:      Effort: Pulmonary effort is normal.      Breath sounds: No wheezing, rhonchi or rales.   Abdominal:      General: Bowel sounds are normal. There is no distension.      Palpations: Abdomen is soft.      Tenderness: There is no abdominal tenderness.   Musculoskeletal:         General: No swelling or deformity.   Skin:     General: Skin is warm and dry.      Findings: No rash.   Neurological:      General: No focal deficit present.      Mental Status: She is alert and oriented to person, place, and time.   Psychiatric:         Mood and Affect: Mood normal.         Behavior: Behavior normal.         EKG: Normal sinus rhythm, leftward axis, borderline voltage criteria for LVH, nonspecific ST abnormality.  No change compared to the previous tracing.    Assessment / Plan:     Low risk from a cardiac standpoint to proceed with redo orthopedic surgery when scheduled in the next month or 2.  No additional cardiac testing needed.  She can briefly hold her aspirin for 5 to 7 days if necessary.    Nonobstructive coronary artery disease.  Patient last underwent a cardiac catheterization in June 2021 which revealed a mid RCA lesion of 50-60% which was nonflow limiting by IFR.  She last underwent a nuclear stress test in June 2022 which was a normal and low risk study.  No new symptoms concerning for angina.  Patient remains on an aspirin, statin and beta-blocker, all of which I would continue.  She can briefly hold her aspirin for

## 2025-07-14 RX ORDER — NITROGLYCERIN 0.4 MG/1
0.4 TABLET SUBLINGUAL EVERY 5 MIN PRN
Qty: 25 TABLET | Refills: 3 | Status: SHIPPED | OUTPATIENT
Start: 2025-07-14

## 2025-08-04 ENCOUNTER — HOSPITAL ENCOUNTER (OUTPATIENT)
Facility: HOSPITAL | Age: 75
Setting detail: RECURRING SERIES
Discharge: HOME | End: 2025-08-07
Payer: MEDICARE

## 2025-08-04 PROCEDURE — 97161 PT EVAL LOW COMPLEX 20 MIN: CPT

## 2025-08-04 PROCEDURE — 97110 THERAPEUTIC EXERCISES: CPT

## 2025-08-04 PROCEDURE — 97535 SELF CARE MNGMENT TRAINING: CPT

## 2025-08-04 PROCEDURE — 97140 MANUAL THERAPY 1/> REGIONS: CPT

## 2025-08-06 ENCOUNTER — HOSPITAL ENCOUNTER (OUTPATIENT)
Facility: HOSPITAL | Age: 75
Setting detail: RECURRING SERIES
Discharge: HOME | End: 2025-08-09
Payer: MEDICARE

## 2025-08-06 PROCEDURE — 97110 THERAPEUTIC EXERCISES: CPT

## 2025-08-06 PROCEDURE — 97140 MANUAL THERAPY 1/> REGIONS: CPT

## 2025-08-06 PROCEDURE — 97530 THERAPEUTIC ACTIVITIES: CPT

## 2025-08-13 ENCOUNTER — HOSPITAL ENCOUNTER (OUTPATIENT)
Facility: HOSPITAL | Age: 75
Setting detail: RECURRING SERIES
Discharge: HOME | End: 2025-08-16
Payer: MEDICARE

## 2025-08-13 PROCEDURE — 97530 THERAPEUTIC ACTIVITIES: CPT

## 2025-08-13 PROCEDURE — 97112 NEUROMUSCULAR REEDUCATION: CPT

## 2025-08-13 PROCEDURE — 97110 THERAPEUTIC EXERCISES: CPT

## 2025-08-15 ENCOUNTER — HOSPITAL ENCOUNTER (OUTPATIENT)
Facility: HOSPITAL | Age: 75
Setting detail: RECURRING SERIES
Discharge: HOME | End: 2025-08-18
Payer: MEDICARE

## 2025-08-15 PROCEDURE — 97530 THERAPEUTIC ACTIVITIES: CPT

## 2025-08-15 PROCEDURE — 97110 THERAPEUTIC EXERCISES: CPT

## 2025-08-15 PROCEDURE — 97112 NEUROMUSCULAR REEDUCATION: CPT

## 2025-08-20 ENCOUNTER — HOSPITAL ENCOUNTER (OUTPATIENT)
Facility: HOSPITAL | Age: 75
Setting detail: RECURRING SERIES
Discharge: HOME | End: 2025-08-23
Payer: MEDICARE

## 2025-08-20 PROCEDURE — 97112 NEUROMUSCULAR REEDUCATION: CPT

## 2025-08-20 PROCEDURE — 97110 THERAPEUTIC EXERCISES: CPT

## 2025-08-20 PROCEDURE — 97530 THERAPEUTIC ACTIVITIES: CPT

## 2025-08-22 ENCOUNTER — HOSPITAL ENCOUNTER (OUTPATIENT)
Facility: HOSPITAL | Age: 75
Setting detail: RECURRING SERIES
Discharge: HOME | End: 2025-08-25
Payer: MEDICARE

## 2025-08-22 PROCEDURE — 97112 NEUROMUSCULAR REEDUCATION: CPT

## 2025-08-22 PROCEDURE — 97530 THERAPEUTIC ACTIVITIES: CPT

## 2025-08-22 PROCEDURE — 97110 THERAPEUTIC EXERCISES: CPT

## 2025-08-27 ENCOUNTER — HOSPITAL ENCOUNTER (OUTPATIENT)
Facility: HOSPITAL | Age: 75
Setting detail: RECURRING SERIES
Discharge: HOME | End: 2025-08-30
Payer: MEDICARE

## 2025-08-27 PROCEDURE — 97112 NEUROMUSCULAR REEDUCATION: CPT

## 2025-08-27 PROCEDURE — 97110 THERAPEUTIC EXERCISES: CPT

## 2025-08-27 PROCEDURE — 97530 THERAPEUTIC ACTIVITIES: CPT

## 2025-08-29 ENCOUNTER — HOSPITAL ENCOUNTER (OUTPATIENT)
Facility: HOSPITAL | Age: 75
Setting detail: RECURRING SERIES
Discharge: HOME | End: 2025-09-01
Payer: MEDICARE

## 2025-08-29 PROCEDURE — 97530 THERAPEUTIC ACTIVITIES: CPT

## 2025-08-29 PROCEDURE — 97110 THERAPEUTIC EXERCISES: CPT

## 2025-08-29 PROCEDURE — 97112 NEUROMUSCULAR REEDUCATION: CPT

## 2025-09-03 ENCOUNTER — HOSPITAL ENCOUNTER (OUTPATIENT)
Facility: HOSPITAL | Age: 75
Setting detail: RECURRING SERIES
Discharge: HOME | End: 2025-09-06
Payer: MEDICARE

## 2025-09-03 PROCEDURE — 97530 THERAPEUTIC ACTIVITIES: CPT

## 2025-09-03 PROCEDURE — 97112 NEUROMUSCULAR REEDUCATION: CPT

## 2025-09-03 PROCEDURE — 97110 THERAPEUTIC EXERCISES: CPT

## 2025-09-03 PROCEDURE — 97016 VASOPNEUMATIC DEVICE THERAPY: CPT

## (undated) DEVICE — CUFF BLD PRESSURE MONITORING LNG AD 23-33 CM 1 TUBE MY CUF

## (undated) DEVICE — LINER SUCT CANSTR 3000CC PLAS SFT PRE ASSEMB W/OUT TBNG W/

## (undated) DEVICE — (D)SYR 10ML 1/5ML GRAD NSAF -- PKGING CHANGE USE ITEM 338027

## (undated) DEVICE — DRAPE,REIN 53X77,STERILE: Brand: MEDLINE

## (undated) DEVICE — MIRAGE SWIFT II PILLOW LGE: Brand: MIRAGE SWIFT II

## (undated) DEVICE — SNARE POLYP M W27MMXL240CM OVL STIFF DISP CAPTIVATOR

## (undated) DEVICE — AVANOS* SHORT BEVEL NEEDLE: Brand: AVANOS

## (undated) DEVICE — SYRINGE 20ML LL S/C 50

## (undated) DEVICE — BITE BLOCK ENDOSCP UNIV AD 6 TO 9.4 MM

## (undated) DEVICE — (D)BNDG ADHESIVE FABRIC 3/4X3 -- DISC BY MFR USE ITEM 357960

## (undated) DEVICE — MEDI-VAC NON-CONDUCTIVE SUCTION TUBING: Brand: CARDINAL HEALTH

## (undated) DEVICE — SYRINGE MED 20ML STD CLR PLAS LUERLOCK TIP N CTRL DISP

## (undated) DEVICE — TRAY SUPP STD NO DRUG W EXTENSION SET

## (undated) DEVICE — ENDOSCOPY PUMP TUBING/ CAP SET: Brand: ERBE

## (undated) DEVICE — DRAPE TWL SURG 16X26IN BLU ORB04] ALLCARE INC]

## (undated) DEVICE — CATHETER SUCT TR FL TIP 14FR W/ O CTRL

## (undated) DEVICE — FLUFF AND POLYMER UNDERPAD,EXTRA HEAVY: Brand: WINGS

## (undated) DEVICE — AIRLIFE™ NASAL OXYGEN CANNULA CURVED, NONFLARED TIP WITH 14 FOOT (4.3 M) CRUSH-RESISTANT TUBING, OVER-THE-EAR STYLE: Brand: AIRLIFE™

## (undated) DEVICE — SYRINGE MED 10ML LUERLOCK TIP W/O SFTY DISP

## (undated) DEVICE — AIRLIFE™ NASAL OXYGEN CANNULA CURVED, FLARED TIP WITH 14 FOOT (4.3 M) CRUSH-RESISTANT TUBING, OVER-THE-EAR STYLE: Brand: AIRLIFE™

## (undated) DEVICE — BASIN EMESIS 500CC ROSE 250/CS 60/PLT: Brand: MEDEGEN MEDICAL PRODUCTS, LLC

## (undated) DEVICE — SYR 50ML SLIP TIP NSAF LF STRL --

## (undated) DEVICE — CURVED SHARP RF CANNULA, RADIOPAQUE MARKER: Brand: RADIOPAQUE RADIOFREQUENCY CANNULA

## (undated) DEVICE — CANNULA ORIG TL CLR W FOAM CUSHIONS AND 14FT SUPL TB 3 CHN

## (undated) DEVICE — FLEX ADVANTAGE 3000CC: Brand: FLEX ADVANTAGE

## (undated) DEVICE — (D)GLOVE EXAM LG NITRL NS -- DISC BY MFR NO SUB

## (undated) DEVICE — MEDI-VAC SUCTION HIGH CAPACITY: Brand: CARDINAL HEALTH

## (undated) DEVICE — FCPS RAD JAW 4LC 240CM W/NDL -- BX/40

## (undated) DEVICE — SYRINGE MED 50ML LUERSLIP TIP

## (undated) DEVICE — STERILE POLYISOPRENE POWDER-FREE SURGICAL GLOVES: Brand: PROTEXIS

## (undated) DEVICE — SOLUTION IRRIG 1000ML H2O STRL BLT

## (undated) DEVICE — GAUZE,SPONGE,4"X4",16PLY,STRL,LF,10/TRAY: Brand: MEDLINE

## (undated) DEVICE — ELECTRODE ES AD DISPER HYDRGEL THN FOAM ADH SCALLOPED EDGE

## (undated) DEVICE — GOWN ISOL IMPERV UNIV, DISP, OPEN BACK, BLUE --

## (undated) DEVICE — SYRINGE MED 25GA 3ML L5/8IN SUBQ PLAS W/ DETACH NDL SFTY

## (undated) DEVICE — GAUZE SPONGES,16 PLY: Brand: CURITY

## (undated) DEVICE — YANKAUER,SMOOTH HANDLE,HIGH CAPACITY: Brand: MEDLINE INDUSTRIES, INC.

## (undated) DEVICE — FORCEPS BX L240CM JAW DIA2.8MM L CAP W/ NDL MIC MESH TOOTH

## (undated) DEVICE — BASIN EMSIS 16OZ GRAPHITE PLAS KID SHP MOLD GRAD FOR ORAL

## (undated) DEVICE — GOWN PLASTIC FILM THMBHKS UNIV BLUE: Brand: CARDINAL HEALTH

## (undated) DEVICE — FCPS RAD JAW 4LC 240CM W/NDL -- BX/20 RADIAL JAW 4

## (undated) DEVICE — CANNULA NSL AD TBNG L14FT STD PVC O2 CRV CONN NONFLARED NSL